# Patient Record
Sex: FEMALE | Race: BLACK OR AFRICAN AMERICAN | Employment: FULL TIME
[De-identification: names, ages, dates, MRNs, and addresses within clinical notes are randomized per-mention and may not be internally consistent; named-entity substitution may affect disease eponyms.]

---

## 2017-01-13 ENCOUNTER — TELEPHONE (OUTPATIENT)
Dept: NEUROLOGY | Facility: CLINIC | Age: 47
End: 2017-01-13

## 2017-01-31 ENCOUNTER — OFFICE VISIT (OUTPATIENT)
Dept: NEUROLOGY | Facility: CLINIC | Age: 47
End: 2017-01-31

## 2017-01-31 DIAGNOSIS — M79.602 PAIN OF LEFT UPPER EXTREMITY: Primary | ICD-10-CM

## 2017-01-31 PROCEDURE — 95886 MUSC TEST DONE W/N TEST COMP: CPT | Performed by: PSYCHIATRY & NEUROLOGY

## 2017-01-31 PROCEDURE — 95909 NRV CNDJ TST 5-6 STUDIES: CPT | Performed by: PSYCHIATRY & NEUROLOGY

## 2017-02-20 ENCOUNTER — OFFICE VISIT (OUTPATIENT)
Dept: ORTHOPEDIC SURGERY | Facility: CLINIC | Age: 47
End: 2017-02-20

## 2017-02-20 VITALS — BODY MASS INDEX: 38.76 KG/M2 | HEIGHT: 68 IN | WEIGHT: 255.73 LBS

## 2017-02-20 DIAGNOSIS — M75.102 ROTATOR CUFF SYNDROME OF LEFT SHOULDER: Primary | ICD-10-CM

## 2017-02-20 DIAGNOSIS — G56.02 CARPAL TUNNEL SYNDROME OF LEFT WRIST: ICD-10-CM

## 2017-02-20 PROCEDURE — 99214 OFFICE O/P EST MOD 30 MIN: CPT | Performed by: ORTHOPAEDIC SURGERY

## 2017-02-20 ASSESSMENT — ENCOUNTER SYMPTOMS
DIARRHEA: 0
VOMITING: 0
CHEST TIGHTNESS: 0
BACK PAIN: 0
WHEEZING: 0
NAUSEA: 0
EYE DISCHARGE: 0
CHOKING: 0
ABDOMINAL PAIN: 0

## 2017-02-24 ENCOUNTER — HOSPITAL ENCOUNTER (OUTPATIENT)
Dept: PHYSICAL THERAPY | Age: 47
Setting detail: THERAPIES SERIES
Discharge: HOME | End: 2017-02-24
Payer: MEDICARE

## 2017-03-21 ENCOUNTER — HOSPITAL ENCOUNTER (OUTPATIENT)
Dept: PHYSICAL THERAPY | Age: 47
Setting detail: THERAPIES SERIES
Discharge: HOME | End: 2017-03-21
Payer: MEDICARE

## 2017-03-21 PROCEDURE — 97110 THERAPEUTIC EXERCISES: CPT

## 2017-03-21 PROCEDURE — 97162 PT EVAL MOD COMPLEX 30 MIN: CPT

## 2017-03-27 ENCOUNTER — HOSPITAL ENCOUNTER (OUTPATIENT)
Dept: PHYSICAL THERAPY | Age: 47
Setting detail: THERAPIES SERIES
Discharge: HOME | End: 2017-03-27
Payer: MEDICARE

## 2017-03-30 ENCOUNTER — HOSPITAL ENCOUNTER (OUTPATIENT)
Dept: PHYSICAL THERAPY | Age: 47
Setting detail: THERAPIES SERIES
Discharge: HOME | End: 2017-03-30
Payer: MEDICARE

## 2017-03-30 PROCEDURE — 97110 THERAPEUTIC EXERCISES: CPT

## 2017-04-10 ENCOUNTER — HOSPITAL ENCOUNTER (OUTPATIENT)
Dept: PHYSICAL THERAPY | Age: 47
Setting detail: THERAPIES SERIES
Discharge: HOME | End: 2017-04-10
Payer: MEDICARE

## 2017-04-13 ENCOUNTER — HOSPITAL ENCOUNTER (OUTPATIENT)
Dept: PHYSICAL THERAPY | Age: 47
Setting detail: THERAPIES SERIES
Discharge: HOME | End: 2017-04-13
Payer: MEDICARE

## 2017-04-13 PROCEDURE — 97140 MANUAL THERAPY 1/> REGIONS: CPT

## 2017-04-13 PROCEDURE — 97110 THERAPEUTIC EXERCISES: CPT

## 2017-04-17 ENCOUNTER — OFFICE VISIT (OUTPATIENT)
Dept: ORTHOPEDIC SURGERY | Age: 47
End: 2017-04-17
Payer: MEDICARE

## 2017-04-17 VITALS — WEIGHT: 255.73 LBS | BODY MASS INDEX: 38.76 KG/M2 | HEIGHT: 68 IN

## 2017-04-17 DIAGNOSIS — M75.102 ROTATOR CUFF SYNDROME OF LEFT SHOULDER: Primary | ICD-10-CM

## 2017-04-17 DIAGNOSIS — G56.02 CARPAL TUNNEL SYNDROME OF LEFT WRIST: ICD-10-CM

## 2017-04-17 PROCEDURE — 99213 OFFICE O/P EST LOW 20 MIN: CPT | Performed by: ORTHOPAEDIC SURGERY

## 2017-04-17 ASSESSMENT — ENCOUNTER SYMPTOMS
VOMITING: 0
SORE THROAT: 0
SHORTNESS OF BREATH: 0
COUGH: 0
DIARRHEA: 0
BACK PAIN: 1
CHOKING: 0
SINUS PRESSURE: 0
NAUSEA: 0

## 2017-04-19 ENCOUNTER — HOSPITAL ENCOUNTER (OUTPATIENT)
Dept: PHYSICAL THERAPY | Age: 47
Setting detail: THERAPIES SERIES
Discharge: HOME | End: 2017-04-19
Payer: MEDICARE

## 2017-04-19 PROCEDURE — 97140 MANUAL THERAPY 1/> REGIONS: CPT

## 2017-04-19 PROCEDURE — 97110 THERAPEUTIC EXERCISES: CPT

## 2017-04-25 ENCOUNTER — HOSPITAL ENCOUNTER (OUTPATIENT)
Dept: PHYSICAL THERAPY | Age: 47
Setting detail: THERAPIES SERIES
Discharge: HOME | End: 2017-04-25
Payer: MEDICARE

## 2017-04-27 ENCOUNTER — HOSPITAL ENCOUNTER (OUTPATIENT)
Dept: PHYSICAL THERAPY | Age: 47
Setting detail: THERAPIES SERIES
Discharge: HOME | End: 2017-04-27
Payer: MEDICARE

## 2017-04-27 PROCEDURE — 97110 THERAPEUTIC EXERCISES: CPT

## 2017-05-02 ENCOUNTER — HOSPITAL ENCOUNTER (OUTPATIENT)
Dept: PHYSICAL THERAPY | Age: 47
Setting detail: THERAPIES SERIES
Discharge: HOME | End: 2017-05-02
Payer: MEDICARE

## 2017-05-02 PROCEDURE — 97110 THERAPEUTIC EXERCISES: CPT

## 2017-05-04 ENCOUNTER — HOSPITAL ENCOUNTER (OUTPATIENT)
Dept: PHYSICAL THERAPY | Age: 47
Setting detail: THERAPIES SERIES
Discharge: HOME | End: 2017-05-04
Payer: MEDICARE

## 2017-05-04 PROCEDURE — 97110 THERAPEUTIC EXERCISES: CPT

## 2017-05-09 ENCOUNTER — HOSPITAL ENCOUNTER (OUTPATIENT)
Dept: PHYSICAL THERAPY | Age: 47
Setting detail: THERAPIES SERIES
Discharge: HOME | End: 2017-05-09
Payer: MEDICARE

## 2017-05-09 PROCEDURE — 97110 THERAPEUTIC EXERCISES: CPT

## 2017-05-11 ENCOUNTER — HOSPITAL ENCOUNTER (OUTPATIENT)
Dept: PHYSICAL THERAPY | Age: 47
Setting detail: THERAPIES SERIES
Discharge: HOME | End: 2017-05-11
Payer: MEDICARE

## 2017-05-11 PROCEDURE — 97110 THERAPEUTIC EXERCISES: CPT

## 2017-05-16 ENCOUNTER — HOSPITAL ENCOUNTER (OUTPATIENT)
Dept: PHYSICAL THERAPY | Age: 47
Setting detail: THERAPIES SERIES
Discharge: HOME | End: 2017-05-16
Payer: MEDICARE

## 2017-05-16 PROCEDURE — 97110 THERAPEUTIC EXERCISES: CPT

## 2017-05-23 ENCOUNTER — HOSPITAL ENCOUNTER (OUTPATIENT)
Dept: PHYSICAL THERAPY | Age: 47
Setting detail: THERAPIES SERIES
Discharge: HOME | End: 2017-05-23
Payer: MEDICARE

## 2017-05-23 PROCEDURE — 97110 THERAPEUTIC EXERCISES: CPT

## 2017-05-25 ENCOUNTER — HOSPITAL ENCOUNTER (OUTPATIENT)
Dept: PHYSICAL THERAPY | Age: 47
Setting detail: THERAPIES SERIES
Discharge: HOME | End: 2017-05-25
Payer: MEDICARE

## 2017-05-25 PROCEDURE — 97110 THERAPEUTIC EXERCISES: CPT

## 2017-07-17 ENCOUNTER — HOSPITAL ENCOUNTER (EMERGENCY)
Age: 47
Discharge: HOME | End: 2017-07-17
Attending: EMERGENCY MEDICINE
Payer: MEDICARE

## 2017-07-17 ENCOUNTER — APPOINTMENT (OUTPATIENT)
Dept: GENERAL RADIOLOGY | Age: 47
End: 2017-07-17
Payer: MEDICARE

## 2017-07-17 VITALS
SYSTOLIC BLOOD PRESSURE: 159 MMHG | HEART RATE: 72 BPM | TEMPERATURE: 97.5 F | DIASTOLIC BLOOD PRESSURE: 104 MMHG | RESPIRATION RATE: 18 BRPM | OXYGEN SATURATION: 100 %

## 2017-07-17 DIAGNOSIS — S86.912A KNEE STRAIN, LEFT, INITIAL ENCOUNTER: Primary | ICD-10-CM

## 2017-07-17 PROCEDURE — 73562 X-RAY EXAM OF KNEE 3: CPT

## 2017-07-17 PROCEDURE — 99283 EMERGENCY DEPT VISIT LOW MDM: CPT

## 2017-07-17 RX ORDER — ACETAMINOPHEN 325 MG/1
325 TABLET ORAL EVERY 6 HOURS PRN
Qty: 30 TABLET | Refills: 0 | Status: SHIPPED | OUTPATIENT
Start: 2017-07-17 | End: 2017-07-17

## 2017-07-17 ASSESSMENT — ENCOUNTER SYMPTOMS
SHORTNESS OF BREATH: 0
COUGH: 0
VOMITING: 0
ABDOMINAL PAIN: 0
COLOR CHANGE: 0
BACK PAIN: 0
NAUSEA: 0
DIARRHEA: 0

## 2017-07-17 ASSESSMENT — PAIN DESCRIPTION - ORIENTATION: ORIENTATION: LEFT

## 2017-07-17 ASSESSMENT — PAIN DESCRIPTION - DESCRIPTORS: DESCRIPTORS: ACHING

## 2017-07-17 ASSESSMENT — PAIN DESCRIPTION - PAIN TYPE: TYPE: ACUTE PAIN

## 2017-07-17 ASSESSMENT — PAIN SCALES - GENERAL: PAINLEVEL_OUTOF10: 10

## 2017-07-17 ASSESSMENT — PAIN DESCRIPTION - LOCATION: LOCATION: KNEE

## 2017-07-17 ASSESSMENT — PAIN DESCRIPTION - PROGRESSION: CLINICAL_PROGRESSION: GRADUALLY WORSENING

## 2017-07-17 ASSESSMENT — PAIN DESCRIPTION - ONSET: ONSET: SUDDEN

## 2017-07-21 ENCOUNTER — HOSPITAL ENCOUNTER (EMERGENCY)
Age: 47
Discharge: HOME | End: 2017-07-21
Attending: EMERGENCY MEDICINE
Payer: MEDICARE

## 2017-07-21 VITALS
BODY MASS INDEX: 39.25 KG/M2 | WEIGHT: 265 LBS | SYSTOLIC BLOOD PRESSURE: 103 MMHG | RESPIRATION RATE: 18 BRPM | DIASTOLIC BLOOD PRESSURE: 69 MMHG | HEART RATE: 87 BPM | TEMPERATURE: 98.6 F | OXYGEN SATURATION: 96 % | HEIGHT: 69 IN

## 2017-07-21 DIAGNOSIS — M25.562 ACUTE PAIN OF LEFT KNEE: Primary | ICD-10-CM

## 2017-07-21 PROCEDURE — 6370000000 HC RX 637 (ALT 250 FOR IP): Performed by: FAMILY MEDICINE

## 2017-07-21 PROCEDURE — 99283 EMERGENCY DEPT VISIT LOW MDM: CPT

## 2017-07-21 RX ORDER — OXYCODONE HYDROCHLORIDE AND ACETAMINOPHEN 5; 325 MG/1; MG/1
1 TABLET ORAL ONCE
Status: COMPLETED | OUTPATIENT
Start: 2017-07-21 | End: 2017-07-21

## 2017-07-21 RX ORDER — HYDROCODONE BITARTRATE AND ACETAMINOPHEN 5; 325 MG/1; MG/1
1 TABLET ORAL EVERY 8 HOURS PRN
Qty: 10 TABLET | Refills: 0 | Status: ON HOLD | OUTPATIENT
Start: 2017-07-21 | End: 2019-04-20 | Stop reason: SDUPTHER

## 2017-07-21 RX ADMIN — OXYCODONE HYDROCHLORIDE AND ACETAMINOPHEN 1 TABLET: 5; 325 TABLET ORAL at 18:40

## 2017-07-21 ASSESSMENT — PAIN DESCRIPTION - PAIN TYPE: TYPE: ACUTE PAIN

## 2017-07-21 ASSESSMENT — ENCOUNTER SYMPTOMS
COUGH: 0
NAUSEA: 0
DIARRHEA: 0
ABDOMINAL PAIN: 0
SHORTNESS OF BREATH: 0
CONSTIPATION: 0
BACK PAIN: 0
SORE THROAT: 0
VOMITING: 0

## 2017-07-21 ASSESSMENT — PAIN DESCRIPTION - LOCATION: LOCATION: KNEE

## 2017-07-21 ASSESSMENT — PAIN SCALES - GENERAL
PAINLEVEL_OUTOF10: 10
PAINLEVEL_OUTOF10: 4
PAINLEVEL_OUTOF10: 10

## 2017-07-21 ASSESSMENT — PAIN DESCRIPTION - ORIENTATION: ORIENTATION: LEFT

## 2017-07-21 ASSESSMENT — PAIN DESCRIPTION - DESCRIPTORS: DESCRIPTORS: ACHING;THROBBING;STABBING;SHARP

## 2018-05-26 ENCOUNTER — APPOINTMENT (OUTPATIENT)
Dept: GENERAL RADIOLOGY | Age: 48
End: 2018-05-26
Payer: MEDICARE

## 2018-05-26 ENCOUNTER — HOSPITAL ENCOUNTER (EMERGENCY)
Age: 48
Discharge: HOME | End: 2018-05-26
Attending: EMERGENCY MEDICINE
Payer: MEDICARE

## 2018-05-26 ENCOUNTER — APPOINTMENT (OUTPATIENT)
Dept: CT IMAGING | Age: 48
End: 2018-05-26
Payer: MEDICARE

## 2018-05-26 VITALS
RESPIRATION RATE: 18 BRPM | TEMPERATURE: 97.9 F | WEIGHT: 273 LBS | OXYGEN SATURATION: 97 % | HEART RATE: 90 BPM | SYSTOLIC BLOOD PRESSURE: 138 MMHG | DIASTOLIC BLOOD PRESSURE: 85 MMHG | BODY MASS INDEX: 40.32 KG/M2

## 2018-05-26 DIAGNOSIS — R10.9 ABDOMINAL PAIN, UNSPECIFIED ABDOMINAL LOCATION: Primary | ICD-10-CM

## 2018-05-26 LAB
-: ABNORMAL
ABSOLUTE EOS #: 0.13 K/UL (ref 0–0.44)
ABSOLUTE IMMATURE GRANULOCYTE: <0.03 K/UL (ref 0–0.3)
ABSOLUTE LYMPH #: 2.84 K/UL (ref 1.1–3.7)
ABSOLUTE MONO #: 0.84 K/UL (ref 0.1–1.2)
ALBUMIN SERPL-MCNC: 3.9 G/DL (ref 3.5–5.2)
ALBUMIN/GLOBULIN RATIO: 1 (ref 1–2.5)
ALP BLD-CCNC: 69 U/L (ref 35–104)
ALT SERPL-CCNC: 9 U/L (ref 5–33)
AMORPHOUS: ABNORMAL
ANION GAP SERPL CALCULATED.3IONS-SCNC: 13 MMOL/L (ref 9–17)
AST SERPL-CCNC: 21 U/L
BACTERIA: ABNORMAL
BASOPHILS # BLD: 0 % (ref 0–2)
BASOPHILS ABSOLUTE: <0.03 K/UL (ref 0–0.2)
BILIRUB SERPL-MCNC: 0.34 MG/DL (ref 0.3–1.2)
BILIRUBIN DIRECT: <0.08 MG/DL
BILIRUBIN URINE: NEGATIVE
BILIRUBIN, INDIRECT: NORMAL MG/DL (ref 0–1)
BUN BLDV-MCNC: 10 MG/DL (ref 6–20)
BUN/CREAT BLD: ABNORMAL (ref 9–20)
CALCIUM SERPL-MCNC: 8.6 MG/DL (ref 8.6–10.4)
CASTS UA: ABNORMAL /LPF (ref 0–8)
CHLORIDE BLD-SCNC: 97 MMOL/L (ref 98–107)
CO2: 23 MMOL/L (ref 20–31)
COLOR: YELLOW
CREAT SERPL-MCNC: 0.55 MG/DL (ref 0.5–0.9)
CRYSTALS, UA: ABNORMAL /HPF
DIFFERENTIAL TYPE: ABNORMAL
EOSINOPHILS RELATIVE PERCENT: 2 % (ref 1–4)
EPITHELIAL CELLS UA: ABNORMAL /HPF (ref 0–5)
GFR AFRICAN AMERICAN: >60 ML/MIN
GFR NON-AFRICAN AMERICAN: >60 ML/MIN
GFR SERPL CREATININE-BSD FRML MDRD: ABNORMAL ML/MIN/{1.73_M2}
GFR SERPL CREATININE-BSD FRML MDRD: ABNORMAL ML/MIN/{1.73_M2}
GLOBULIN: NORMAL G/DL (ref 1.5–3.8)
GLUCOSE BLD-MCNC: 98 MG/DL (ref 70–99)
GLUCOSE URINE: NEGATIVE
HCT VFR BLD CALC: 36.2 % (ref 36.3–47.1)
HEMOGLOBIN: 11 G/DL (ref 11.9–15.1)
IMMATURE GRANULOCYTES: 0 %
KETONES, URINE: NEGATIVE
LEUKOCYTE ESTERASE, URINE: NEGATIVE
LIPASE: 20 U/L (ref 13–60)
LYMPHOCYTES # BLD: 35 % (ref 24–43)
MCH RBC QN AUTO: 22.4 PG (ref 25.2–33.5)
MCHC RBC AUTO-ENTMCNC: 30.4 G/DL (ref 28.4–34.8)
MCV RBC AUTO: 73.7 FL (ref 82.6–102.9)
MONOCYTES # BLD: 10 % (ref 3–12)
MUCUS: ABNORMAL
NITRITE, URINE: NEGATIVE
NRBC AUTOMATED: 0 PER 100 WBC
OTHER OBSERVATIONS UA: ABNORMAL
PDW BLD-RTO: 14.8 % (ref 11.8–14.4)
PH UA: 5.5 (ref 5–8)
PLATELET # BLD: 252 K/UL (ref 138–453)
PLATELET ESTIMATE: ABNORMAL
PMV BLD AUTO: 10.7 FL (ref 8.1–13.5)
POTASSIUM SERPL-SCNC: 3.5 MMOL/L (ref 3.7–5.3)
PROTEIN UA: NEGATIVE
RBC # BLD: 4.91 M/UL (ref 3.95–5.11)
RBC # BLD: ABNORMAL 10*6/UL
RBC UA: ABNORMAL /HPF (ref 0–4)
RENAL EPITHELIAL, UA: ABNORMAL /HPF
SEG NEUTROPHILS: 53 % (ref 36–65)
SEGMENTED NEUTROPHILS ABSOLUTE COUNT: 4.31 K/UL (ref 1.5–8.1)
SODIUM BLD-SCNC: 133 MMOL/L (ref 135–144)
SPECIFIC GRAVITY UA: 1.02 (ref 1–1.03)
TOTAL PROTEIN: 7.7 G/DL (ref 6.4–8.3)
TRICHOMONAS: ABNORMAL
TURBIDITY: CLEAR
URINE HGB: ABNORMAL
UROBILINOGEN, URINE: NORMAL
WBC # BLD: 8.2 K/UL (ref 3.5–11.3)
WBC # BLD: ABNORMAL 10*3/UL
WBC UA: ABNORMAL /HPF (ref 0–5)
YEAST: ABNORMAL

## 2018-05-26 PROCEDURE — 80076 HEPATIC FUNCTION PANEL: CPT

## 2018-05-26 PROCEDURE — 2580000003 HC RX 258: Performed by: EMERGENCY MEDICINE

## 2018-05-26 PROCEDURE — 6360000002 HC RX W HCPCS: Performed by: EMERGENCY MEDICINE

## 2018-05-26 PROCEDURE — 87086 URINE CULTURE/COLONY COUNT: CPT

## 2018-05-26 PROCEDURE — 99285 EMERGENCY DEPT VISIT HI MDM: CPT

## 2018-05-26 PROCEDURE — 96374 THER/PROPH/DIAG INJ IV PUSH: CPT

## 2018-05-26 PROCEDURE — 74176 CT ABD & PELVIS W/O CONTRAST: CPT

## 2018-05-26 PROCEDURE — 85025 COMPLETE CBC W/AUTO DIFF WBC: CPT

## 2018-05-26 PROCEDURE — 83690 ASSAY OF LIPASE: CPT

## 2018-05-26 PROCEDURE — 6370000000 HC RX 637 (ALT 250 FOR IP): Performed by: EMERGENCY MEDICINE

## 2018-05-26 PROCEDURE — 80048 BASIC METABOLIC PNL TOTAL CA: CPT

## 2018-05-26 PROCEDURE — 81001 URINALYSIS AUTO W/SCOPE: CPT

## 2018-05-26 PROCEDURE — 96375 TX/PRO/DX INJ NEW DRUG ADDON: CPT

## 2018-05-26 PROCEDURE — 6370000000 HC RX 637 (ALT 250 FOR IP)

## 2018-05-26 PROCEDURE — 74022 RADEX COMPL AQT ABD SERIES: CPT

## 2018-05-26 RX ORDER — DIPHENHYDRAMINE HCL 25 MG
25 CAPSULE ORAL EVERY 6 HOURS PRN
Qty: 30 CAPSULE | Refills: 0 | Status: SHIPPED | OUTPATIENT
Start: 2018-05-26 | End: 2018-06-05

## 2018-05-26 RX ORDER — 0.9 % SODIUM CHLORIDE 0.9 %
1000 INTRAVENOUS SOLUTION INTRAVENOUS ONCE
Status: COMPLETED | OUTPATIENT
Start: 2018-05-26 | End: 2018-05-26

## 2018-05-26 RX ORDER — DICYCLOMINE HYDROCHLORIDE 10 MG/ML
20 INJECTION INTRAMUSCULAR ONCE
Status: DISCONTINUED | OUTPATIENT
Start: 2018-05-26 | End: 2018-05-26 | Stop reason: HOSPADM

## 2018-05-26 RX ORDER — MAGNESIUM HYDROXIDE/ALUMINUM HYDROXICE/SIMETHICONE 120; 1200; 1200 MG/30ML; MG/30ML; MG/30ML
30 SUSPENSION ORAL ONCE
Status: COMPLETED | OUTPATIENT
Start: 2018-05-26 | End: 2018-05-26

## 2018-05-26 RX ORDER — DICYCLOMINE HYDROCHLORIDE 10 MG/1
10 CAPSULE ORAL EVERY 6 HOURS PRN
Qty: 20 CAPSULE | Refills: 0 | Status: SHIPPED | OUTPATIENT
Start: 2018-05-26

## 2018-05-26 RX ORDER — METOCLOPRAMIDE HYDROCHLORIDE 5 MG/ML
10 INJECTION INTRAMUSCULAR; INTRAVENOUS ONCE
Status: COMPLETED | OUTPATIENT
Start: 2018-05-26 | End: 2018-05-26

## 2018-05-26 RX ORDER — DICYCLOMINE HYDROCHLORIDE 10 MG/1
CAPSULE ORAL
Status: COMPLETED
Start: 2018-05-26 | End: 2018-05-26

## 2018-05-26 RX ORDER — DIPHENHYDRAMINE HYDROCHLORIDE 50 MG/ML
25 INJECTION INTRAMUSCULAR; INTRAVENOUS ONCE
Status: COMPLETED | OUTPATIENT
Start: 2018-05-26 | End: 2018-05-26

## 2018-05-26 RX ORDER — METOCLOPRAMIDE 10 MG/1
10 TABLET ORAL 3 TIMES DAILY PRN
Qty: 30 TABLET | Refills: 0 | Status: ON HOLD | OUTPATIENT
Start: 2018-05-26 | End: 2019-04-19 | Stop reason: ALTCHOICE

## 2018-05-26 RX ADMIN — DIPHENHYDRAMINE HYDROCHLORIDE 25 MG: 50 INJECTION, SOLUTION INTRAMUSCULAR; INTRAVENOUS at 03:25

## 2018-05-26 RX ADMIN — LIDOCAINE HYDROCHLORIDE 15 ML: 20 SOLUTION ORAL; TOPICAL at 02:46

## 2018-05-26 RX ADMIN — METOCLOPRAMIDE 10 MG: 5 INJECTION, SOLUTION INTRAMUSCULAR; INTRAVENOUS at 03:25

## 2018-05-26 RX ADMIN — DICYCLOMINE HYDROCHLORIDE 10 MG: 10 CAPSULE ORAL at 02:46

## 2018-05-26 RX ADMIN — SODIUM CHLORIDE 1000 ML: 9 INJECTION, SOLUTION INTRAVENOUS at 02:46

## 2018-05-26 RX ADMIN — ALUMINUM HYDROXIDE, MAGNESIUM HYDROXIDE, AND SIMETHICONE 30 ML: 200; 200; 20 SUSPENSION ORAL at 02:46

## 2018-05-26 ASSESSMENT — PAIN DESCRIPTION - ORIENTATION: ORIENTATION: MID;LOWER

## 2018-05-26 ASSESSMENT — PAIN DESCRIPTION - PAIN TYPE: TYPE: CHRONIC PAIN

## 2018-05-26 ASSESSMENT — ENCOUNTER SYMPTOMS
DIARRHEA: 1
ABDOMINAL PAIN: 1
COUGH: 0
CONSTIPATION: 1
EYE REDNESS: 0
CHEST TIGHTNESS: 0
NAUSEA: 0
RHINORRHEA: 0
BLOOD IN STOOL: 0
VOMITING: 0
SORE THROAT: 0
SHORTNESS OF BREATH: 0
EYE DISCHARGE: 0

## 2018-05-26 ASSESSMENT — PAIN DESCRIPTION - DESCRIPTORS: DESCRIPTORS: BURNING;TIGHTNESS

## 2018-05-26 ASSESSMENT — PAIN DESCRIPTION - LOCATION: LOCATION: ABDOMEN

## 2018-05-26 ASSESSMENT — PAIN SCALES - GENERAL: PAINLEVEL_OUTOF10: 9

## 2018-05-26 ASSESSMENT — PAIN DESCRIPTION - FREQUENCY: FREQUENCY: CONTINUOUS

## 2018-05-27 LAB
CULTURE: NORMAL
CULTURE: NORMAL
Lab: NORMAL
SPECIMEN DESCRIPTION: NORMAL
STATUS: NORMAL

## 2018-06-29 ENCOUNTER — HOSPITAL ENCOUNTER (OUTPATIENT)
Age: 48
Discharge: HOME | End: 2018-06-29
Payer: MEDICARE

## 2018-06-29 LAB
ALBUMIN SERPL-MCNC: 4.4 G/DL (ref 3.5–5.2)
ALBUMIN/GLOBULIN RATIO: 1.2 (ref 1–2.5)
ALP BLD-CCNC: 59 U/L (ref 35–104)
ALT SERPL-CCNC: 16 U/L (ref 5–33)
ANION GAP SERPL CALCULATED.3IONS-SCNC: 15 MMOL/L (ref 9–17)
AST SERPL-CCNC: 23 U/L
BILIRUB SERPL-MCNC: 0.38 MG/DL (ref 0.3–1.2)
BUN BLDV-MCNC: 8 MG/DL (ref 6–20)
BUN/CREAT BLD: ABNORMAL (ref 9–20)
CALCIUM SERPL-MCNC: 9.3 MG/DL (ref 8.6–10.4)
CHLORIDE BLD-SCNC: 103 MMOL/L (ref 98–107)
CHOLESTEROL/HDL RATIO: 3.6
CHOLESTEROL: 186 MG/DL
CO2: 23 MMOL/L (ref 20–31)
CREAT SERPL-MCNC: 0.57 MG/DL (ref 0.5–0.9)
GFR AFRICAN AMERICAN: >60 ML/MIN
GFR NON-AFRICAN AMERICAN: >60 ML/MIN
GFR SERPL CREATININE-BSD FRML MDRD: ABNORMAL ML/MIN/{1.73_M2}
GFR SERPL CREATININE-BSD FRML MDRD: ABNORMAL ML/MIN/{1.73_M2}
GLUCOSE BLD-MCNC: 80 MG/DL (ref 70–99)
HCT VFR BLD CALC: 40 % (ref 36.3–47.1)
HDLC SERPL-MCNC: 51 MG/DL
HEMOGLOBIN: 11.9 G/DL (ref 11.9–15.1)
LDL CHOLESTEROL: 119 MG/DL (ref 0–130)
MCH RBC QN AUTO: 22.2 PG (ref 25.2–33.5)
MCHC RBC AUTO-ENTMCNC: 29.8 G/DL (ref 28.4–34.8)
MCV RBC AUTO: 74.8 FL (ref 82.6–102.9)
NRBC AUTOMATED: 0 PER 100 WBC
PDW BLD-RTO: 15.2 % (ref 11.8–14.4)
PLATELET # BLD: 247 K/UL (ref 138–453)
PMV BLD AUTO: 11.9 FL (ref 8.1–13.5)
POTASSIUM SERPL-SCNC: 3.6 MMOL/L (ref 3.7–5.3)
RBC # BLD: 5.35 M/UL (ref 3.95–5.11)
SODIUM BLD-SCNC: 141 MMOL/L (ref 135–144)
T3 TOTAL: 138 NG/DL (ref 80–200)
T4 TOTAL: 6.8 UG/DL (ref 4.5–12)
TOTAL PROTEIN: 8.2 G/DL (ref 6.4–8.3)
TRIGL SERPL-MCNC: 80 MG/DL
TSH SERPL DL<=0.05 MIU/L-ACNC: 3.23 MIU/L (ref 0.3–5)
VITAMIN D 25-HYDROXY: 12.4 NG/ML (ref 30–100)
VLDLC SERPL CALC-MCNC: NORMAL MG/DL (ref 1–30)
WBC # BLD: 5.7 K/UL (ref 3.5–11.3)

## 2018-06-29 PROCEDURE — 84436 ASSAY OF TOTAL THYROXINE: CPT

## 2018-06-29 PROCEDURE — 80053 COMPREHEN METABOLIC PANEL: CPT

## 2018-06-29 PROCEDURE — 80061 LIPID PANEL: CPT

## 2018-06-29 PROCEDURE — 82306 VITAMIN D 25 HYDROXY: CPT

## 2018-06-29 PROCEDURE — 84480 ASSAY TRIIODOTHYRONINE (T3): CPT

## 2018-06-29 PROCEDURE — 36415 COLL VENOUS BLD VENIPUNCTURE: CPT

## 2018-06-29 PROCEDURE — 84443 ASSAY THYROID STIM HORMONE: CPT

## 2018-06-29 PROCEDURE — 85027 COMPLETE CBC AUTOMATED: CPT

## 2018-12-04 ENCOUNTER — HOSPITAL ENCOUNTER (EMERGENCY)
Age: 48
Discharge: HOME | End: 2018-12-04
Attending: EMERGENCY MEDICINE
Payer: MEDICARE

## 2018-12-04 VITALS
HEART RATE: 90 BPM | OXYGEN SATURATION: 100 % | RESPIRATION RATE: 18 BRPM | TEMPERATURE: 97.4 F | WEIGHT: 239 LBS | HEIGHT: 69 IN | DIASTOLIC BLOOD PRESSURE: 105 MMHG | SYSTOLIC BLOOD PRESSURE: 162 MMHG | BODY MASS INDEX: 35.4 KG/M2

## 2018-12-04 DIAGNOSIS — M79.7 FIBROMYALGIA: ICD-10-CM

## 2018-12-04 DIAGNOSIS — K08.89 PAIN, DENTAL: ICD-10-CM

## 2018-12-04 DIAGNOSIS — H66.90 ACUTE OTITIS MEDIA, UNSPECIFIED OTITIS MEDIA TYPE: Primary | ICD-10-CM

## 2018-12-04 PROCEDURE — 2580000003 HC RX 258: Performed by: EMERGENCY MEDICINE

## 2018-12-04 PROCEDURE — 6360000002 HC RX W HCPCS: Performed by: EMERGENCY MEDICINE

## 2018-12-04 PROCEDURE — 96365 THER/PROPH/DIAG IV INF INIT: CPT

## 2018-12-04 PROCEDURE — 96372 THER/PROPH/DIAG INJ SC/IM: CPT

## 2018-12-04 PROCEDURE — 99283 EMERGENCY DEPT VISIT LOW MDM: CPT

## 2018-12-04 PROCEDURE — 6370000000 HC RX 637 (ALT 250 FOR IP): Performed by: EMERGENCY MEDICINE

## 2018-12-04 PROCEDURE — 96375 TX/PRO/DX INJ NEW DRUG ADDON: CPT

## 2018-12-04 RX ORDER — ORPHENADRINE CITRATE 100 MG/1
100 TABLET, EXTENDED RELEASE ORAL 2 TIMES DAILY
Qty: 20 TABLET | Refills: 0 | Status: SHIPPED | OUTPATIENT
Start: 2018-12-04 | End: 2018-12-14

## 2018-12-04 RX ORDER — KETOROLAC TROMETHAMINE 30 MG/ML
30 INJECTION, SOLUTION INTRAMUSCULAR; INTRAVENOUS ONCE
Status: DISCONTINUED | OUTPATIENT
Start: 2018-12-04 | End: 2018-12-04

## 2018-12-04 RX ORDER — HYDROXYZINE 50 MG/1
50 TABLET, FILM COATED ORAL 3 TIMES DAILY PRN
Qty: 25 TABLET | Refills: 0 | Status: SHIPPED | OUTPATIENT
Start: 2018-12-04 | End: 2018-12-14

## 2018-12-04 RX ORDER — ACETAMINOPHEN 325 MG/1
325 TABLET ORAL EVERY 6 HOURS PRN
Qty: 45 TABLET | Refills: 0 | Status: SHIPPED | OUTPATIENT
Start: 2018-12-04 | End: 2018-12-19

## 2018-12-04 RX ORDER — ACETAMINOPHEN 500 MG
1000 TABLET ORAL ONCE
Status: COMPLETED | OUTPATIENT
Start: 2018-12-04 | End: 2018-12-04

## 2018-12-04 RX ORDER — ORPHENADRINE CITRATE 100 MG/1
100 TABLET, EXTENDED RELEASE ORAL ONCE
Status: COMPLETED | OUTPATIENT
Start: 2018-12-04 | End: 2018-12-04

## 2018-12-04 RX ORDER — KETOROLAC TROMETHAMINE 30 MG/ML
30 INJECTION, SOLUTION INTRAMUSCULAR; INTRAVENOUS ONCE
Status: COMPLETED | OUTPATIENT
Start: 2018-12-04 | End: 2018-12-04

## 2018-12-04 RX ORDER — HYDROXYZINE HYDROCHLORIDE 25 MG/1
25 TABLET, FILM COATED ORAL ONCE
Status: COMPLETED | OUTPATIENT
Start: 2018-12-04 | End: 2018-12-04

## 2018-12-04 RX ORDER — CEFTRIAXONE 2 G/1
2 INJECTION, POWDER, FOR SOLUTION INTRAMUSCULAR; INTRAVENOUS ONCE
Status: DISCONTINUED | OUTPATIENT
Start: 2018-12-04 | End: 2018-12-04

## 2018-12-04 RX ADMIN — CEFTRIAXONE SODIUM 2 G: 2 INJECTION, POWDER, FOR SOLUTION INTRAMUSCULAR; INTRAVENOUS at 04:04

## 2018-12-04 RX ADMIN — KETOROLAC TROMETHAMINE 30 MG: 30 INJECTION, SOLUTION INTRAMUSCULAR at 04:04

## 2018-12-04 RX ADMIN — ACETAMINOPHEN 1000 MG: 500 TABLET ORAL at 04:45

## 2018-12-04 RX ADMIN — ORPHENADRINE CITRATE 100 MG: 100 TABLET, EXTENDED RELEASE ORAL at 04:04

## 2018-12-04 RX ADMIN — HYDROXYZINE HYDROCHLORIDE 25 MG: 25 TABLET ORAL at 04:04

## 2018-12-04 ASSESSMENT — PAIN SCALES - GENERAL
PAINLEVEL_OUTOF10: 10
PAINLEVEL_OUTOF10: 10
PAINLEVEL_OUTOF10: 7

## 2018-12-04 ASSESSMENT — ENCOUNTER SYMPTOMS
ABDOMINAL PAIN: 0
EYE DISCHARGE: 0
BLOOD IN STOOL: 0
SHORTNESS OF BREATH: 0
CHEST TIGHTNESS: 0
DIARRHEA: 0
RHINORRHEA: 0
VOMITING: 0
NAUSEA: 0
SINUS PAIN: 1
EYE REDNESS: 0
SORE THROAT: 0
COUGH: 1

## 2018-12-04 ASSESSMENT — PAIN DESCRIPTION - FREQUENCY: FREQUENCY: INTERMITTENT

## 2018-12-04 ASSESSMENT — PAIN DESCRIPTION - LOCATION: LOCATION: JAW;HEAD

## 2018-12-04 ASSESSMENT — PAIN DESCRIPTION - PAIN TYPE: TYPE: ACUTE PAIN;CHRONIC PAIN

## 2018-12-04 NOTE — ED PROVIDER NOTES
for abdominal pain, blood in stool, diarrhea, nausea and vomiting. Endocrine: Negative for polydipsia and polyuria. Genitourinary: Negative for dysuria and hematuria. Musculoskeletal: Positive for myalgias. Negative for neck pain and neck stiffness. Skin: Negative for rash and wound. Allergic/Immunologic: Negative for immunocompromised state. Neurological: Negative for weakness, numbness and headaches. Hematological: Negative for adenopathy. Psychiatric/Behavioral: Negative for agitation and behavioral problems. PHYSICAL EXAM   (up to 7 for level 4, 8 or more for level 5)      INITIAL VITALS:   BP (!) 162/105   Pulse 90   Temp 97.4 °F (36.3 °C) (Oral)   Resp 18   Ht 5' 9\" (1.753 m)   Wt 239 lb (108.4 kg)   SpO2 100%   BMI 35.29 kg/m²     Physical Exam   Constitutional: She is oriented to person, place, and time. She appears well-developed and well-nourished. No distress. Patient is well-appearing, nontoxic, no acute distress, resting comfortably in bed     HENT:   Head: Normocephalic and atraumatic. Posterior oropharynx normal with no fullness, no erythema, no uvular deviation, no tonsillar hypertrophy or exudate seen, no difficulty controlling secretions, no change in phonation, no asymmetry noted, tympanostomy tubes seen bilaterally, right better membrane appears normal, left tympanic membrane slightly erythematous with fluid behind the ear, concerning for otitis media, dental pain, no areas of fluctuance or noted abscesses     Eyes: Pupils are equal, round, and reactive to light. Conjunctivae and EOM are normal.   Neck: Normal range of motion. Neck supple. No JVD present. No tracheal deviation present. Cardiovascular: Normal rate, regular rhythm, normal heart sounds and intact distal pulses. Hypertension   Pulmonary/Chest: Effort normal and breath sounds normal. No stridor. No respiratory distress. She has no wheezes. She has no rales. She exhibits no tenderness.    Abdominal:

## 2018-12-11 ENCOUNTER — HOSPITAL ENCOUNTER (EMERGENCY)
Age: 48
Discharge: HOME | End: 2018-12-11
Attending: EMERGENCY MEDICINE
Payer: MEDICARE

## 2018-12-11 VITALS
TEMPERATURE: 97.5 F | RESPIRATION RATE: 16 BRPM | SYSTOLIC BLOOD PRESSURE: 145 MMHG | DIASTOLIC BLOOD PRESSURE: 89 MMHG | BODY MASS INDEX: 35.4 KG/M2 | WEIGHT: 239 LBS | HEIGHT: 69 IN | HEART RATE: 75 BPM | OXYGEN SATURATION: 100 %

## 2018-12-11 DIAGNOSIS — K08.89 PAIN, DENTAL: Primary | ICD-10-CM

## 2018-12-11 PROCEDURE — 99282 EMERGENCY DEPT VISIT SF MDM: CPT

## 2018-12-11 PROCEDURE — 6370000000 HC RX 637 (ALT 250 FOR IP): Performed by: STUDENT IN AN ORGANIZED HEALTH CARE EDUCATION/TRAINING PROGRAM

## 2018-12-11 RX ORDER — CLINDAMYCIN HYDROCHLORIDE 150 MG/1
450 CAPSULE ORAL 3 TIMES DAILY
Qty: 63 CAPSULE | Refills: 0 | Status: SHIPPED | OUTPATIENT
Start: 2018-12-11 | End: 2018-12-18

## 2018-12-11 RX ORDER — CEPHALEXIN 500 MG/1
500 CAPSULE ORAL 4 TIMES DAILY
COMMUNITY
End: 2019-09-18

## 2018-12-11 RX ORDER — CLINDAMYCIN HYDROCHLORIDE 150 MG/1
450 CAPSULE ORAL ONCE
Status: COMPLETED | OUTPATIENT
Start: 2018-12-11 | End: 2018-12-11

## 2018-12-11 RX ADMIN — CLINDAMYCIN HYDROCHLORIDE 450 MG: 150 CAPSULE ORAL at 04:35

## 2018-12-11 RX ADMIN — BENZOCAINE 1 EACH: 220 GEL, DENTIFRICE DENTAL at 04:35

## 2018-12-11 ASSESSMENT — ENCOUNTER SYMPTOMS
NAUSEA: 0
COUGH: 0
TROUBLE SWALLOWING: 0
ABDOMINAL PAIN: 0
VOMITING: 0
CHEST TIGHTNESS: 0
WHEEZING: 0
SORE THROAT: 0
SHORTNESS OF BREATH: 0
PHOTOPHOBIA: 0
BACK PAIN: 0

## 2018-12-11 NOTE — ED PROVIDER NOTES
0       DDX: Epiglottitis, tod's angina, retropharyngeal abscess/cellulitis, parapharyngeal abscess, peritonsillar abscess, mononucleosis, carotid dissection/aneurysm, alveolar osteitis (dry socket), pharyngitis, URI, foreign body aspiration or ingestion, trauma, dental cavitis, post-extraction pain, TMJ pain    Evaluate for: fever, sweats, chills, signs or symptoms of significant infection or abscess, stridor, difficulty swallowing, airway compromise, Minimal redness, exudates, swelling/ lesions, adenopathy. DIAGNOSTIC RESULTS / EMERGENCY DEPARTMENT COURSE / MDM     LABS:  No results found for this visit on 12/11/18. RADIOLOGY:  None    EKG  None    All EKG's are interpreted by the Emergency Department Physician who either signs or Co-signs this chart in the absence of a cardiologist.    EMERGENCY DEPARTMENT COURSE:  ED Course as of Dec 11 0455   Tue Dec 11, 2018   0441 Pt complaint right-sided dental pain. .  Patient has dental appointment scheduled for tooth extraction. She states that had multiple abscesses in the past.  Patient denying any fevers, chills, sweats, nausea, vomiting. Patient tolerating oral solids and fluids. [AD]   9768 pt allergic to penicillin, we'll give clindamycin. Patient also received lidocaine topical anesthetic. Advised patient to change dental appointment to a sooner date. Patient without any significant periodontal swelling. No uvular deviation, peritonsillar abscess. Patient tolerating secretions. Vital signs stable. We'll discharge patient with return precautions and follow-up instructions. [AD]      ED Course User Index  [AD] García Crowley MD         PROCEDURES:  None    CONSULTS:  None    CRITICAL CARE:  None    FINAL IMPRESSION      1.  Pain, dental          DISPOSITION / PLAN     DISPOSITION Discharge - Pending Orders Complete 12/11/2018 04:32:20 AM      PATIENT REFERRED TO:  Adam Hubbard, DO  [id number]  [phone]    Schedule an appointment as soon as possible for a visit in 1 day  For Follow-up      DISCHARGE MEDICATIONS:  New Prescriptions    BENZOCAINE (LOLLICAINE) 20 % SWAB DENTAL SWAB    Take 1 each by mouth 3 times daily as needed for Pain    CLINDAMYCIN (CLEOCIN) 150 MG CAPSULE    Take 3 capsules by mouth 3 times daily for 7 days       Isabel Ghosh MD  Emergency Medicine Resident    (Please note that portions of thisnote were completed with a voice recognition program.  Efforts were made to edit the dictations but occasionally words are mis-transcribed.)         Isabel Ghosh MD  12/11/18 2662

## 2018-12-19 ENCOUNTER — HOSPITAL ENCOUNTER (EMERGENCY)
Age: 48
Discharge: HOME | End: 2018-12-19
Attending: EMERGENCY MEDICINE
Payer: MEDICARE

## 2018-12-19 ENCOUNTER — APPOINTMENT (OUTPATIENT)
Dept: GENERAL RADIOLOGY | Age: 48
End: 2018-12-19
Payer: MEDICARE

## 2018-12-19 VITALS
RESPIRATION RATE: 18 BRPM | BODY MASS INDEX: 35.73 KG/M2 | HEART RATE: 95 BPM | WEIGHT: 235 LBS | DIASTOLIC BLOOD PRESSURE: 89 MMHG | SYSTOLIC BLOOD PRESSURE: 135 MMHG | OXYGEN SATURATION: 100 % | TEMPERATURE: 99.3 F

## 2018-12-19 VITALS
HEART RATE: 98 BPM | BODY MASS INDEX: 35.61 KG/M2 | RESPIRATION RATE: 14 BRPM | TEMPERATURE: 97.5 F | OXYGEN SATURATION: 94 % | WEIGHT: 235 LBS | HEIGHT: 68 IN | SYSTOLIC BLOOD PRESSURE: 138 MMHG | DIASTOLIC BLOOD PRESSURE: 84 MMHG

## 2018-12-19 DIAGNOSIS — H65.93 BILATERAL NON-SUPPURATIVE OTITIS MEDIA: ICD-10-CM

## 2018-12-19 DIAGNOSIS — M25.562 ACUTE PAIN OF LEFT KNEE: Primary | ICD-10-CM

## 2018-12-19 DIAGNOSIS — G89.29 CHRONIC PAIN OF LEFT KNEE: Primary | ICD-10-CM

## 2018-12-19 DIAGNOSIS — M25.562 CHRONIC PAIN OF LEFT KNEE: Primary | ICD-10-CM

## 2018-12-19 PROCEDURE — 99283 EMERGENCY DEPT VISIT LOW MDM: CPT

## 2018-12-19 PROCEDURE — 99282 EMERGENCY DEPT VISIT SF MDM: CPT

## 2018-12-19 PROCEDURE — 73562 X-RAY EXAM OF KNEE 3: CPT

## 2018-12-19 RX ORDER — AMOXICILLIN 500 MG/1
500 CAPSULE ORAL 3 TIMES DAILY
Qty: 30 CAPSULE | Refills: 0 | Status: SHIPPED | OUTPATIENT
Start: 2018-12-19 | End: 2018-12-19

## 2018-12-19 RX ORDER — FLUCONAZOLE 100 MG/1
100 TABLET ORAL DAILY
Qty: 10 TABLET | Refills: 0 | Status: SHIPPED | OUTPATIENT
Start: 2018-12-19 | End: 2018-12-19

## 2018-12-19 RX ORDER — AMOXICILLIN 500 MG/1
500 CAPSULE ORAL 3 TIMES DAILY
Qty: 30 CAPSULE | Refills: 0 | Status: SHIPPED | OUTPATIENT
Start: 2018-12-19 | End: 2018-12-19 | Stop reason: CLARIF

## 2018-12-19 RX ORDER — ACETAMINOPHEN 325 MG/1
650 TABLET ORAL EVERY 6 HOURS PRN
Qty: 45 TABLET | Refills: 0 | Status: SHIPPED | OUTPATIENT
Start: 2018-12-19 | End: 2019-08-30

## 2018-12-19 RX ORDER — FLUCONAZOLE 100 MG/1
100 TABLET ORAL DAILY
Qty: 10 TABLET | Refills: 0 | Status: SHIPPED | OUTPATIENT
Start: 2018-12-19 | End: 2018-12-19 | Stop reason: CLARIF

## 2018-12-19 ASSESSMENT — ENCOUNTER SYMPTOMS
VOMITING: 0
CHEST TIGHTNESS: 0
EYE REDNESS: 0
CHOKING: 0
EYE PAIN: 0
RHINORRHEA: 1
EYE DISCHARGE: 0
ABDOMINAL DISTENTION: 0
DIARRHEA: 0
EYE ITCHING: 0
ABDOMINAL PAIN: 0
BACK PAIN: 0
NAUSEA: 0
RHINORRHEA: 0
SHORTNESS OF BREATH: 0
SINUS PAIN: 0
EYE ITCHING: 0
BACK PAIN: 0
APNEA: 0
COUGH: 0
ABDOMINAL PAIN: 0

## 2018-12-19 ASSESSMENT — PAIN DESCRIPTION - LOCATION
LOCATION: KNEE
LOCATION: KNEE

## 2018-12-19 ASSESSMENT — PAIN DESCRIPTION - PROGRESSION: CLINICAL_PROGRESSION: GRADUALLY WORSENING

## 2018-12-19 ASSESSMENT — PAIN DESCRIPTION - ORIENTATION
ORIENTATION: LEFT
ORIENTATION: LEFT

## 2018-12-19 ASSESSMENT — PAIN DESCRIPTION - PAIN TYPE
TYPE: ACUTE PAIN
TYPE: ACUTE PAIN

## 2018-12-19 ASSESSMENT — PAIN DESCRIPTION - ONSET: ONSET: GRADUAL

## 2018-12-19 ASSESSMENT — PAIN SCALES - GENERAL
PAINLEVEL_OUTOF10: 10
PAINLEVEL_OUTOF10: 8

## 2018-12-19 ASSESSMENT — PAIN DESCRIPTION - FREQUENCY: FREQUENCY: CONTINUOUS

## 2018-12-19 ASSESSMENT — PAIN DESCRIPTION - DESCRIPTORS: DESCRIPTORS: THROBBING

## 2019-04-16 ENCOUNTER — APPOINTMENT (OUTPATIENT)
Dept: GENERAL RADIOLOGY | Age: 49
End: 2019-04-16
Payer: MEDICARE

## 2019-04-16 ENCOUNTER — HOSPITAL ENCOUNTER (EMERGENCY)
Age: 49
Discharge: HOME | End: 2019-04-16
Attending: EMERGENCY MEDICINE
Payer: MEDICARE

## 2019-04-16 VITALS
TEMPERATURE: 98.4 F | BODY MASS INDEX: 35.77 KG/M2 | SYSTOLIC BLOOD PRESSURE: 145 MMHG | OXYGEN SATURATION: 97 % | WEIGHT: 236 LBS | HEART RATE: 85 BPM | HEIGHT: 68 IN | RESPIRATION RATE: 19 BRPM | DIASTOLIC BLOOD PRESSURE: 96 MMHG

## 2019-04-16 DIAGNOSIS — M25.462 EFFUSION OF LEFT KNEE: Primary | ICD-10-CM

## 2019-04-16 PROCEDURE — 99283 EMERGENCY DEPT VISIT LOW MDM: CPT

## 2019-04-16 PROCEDURE — 6370000000 HC RX 637 (ALT 250 FOR IP): Performed by: EMERGENCY MEDICINE

## 2019-04-16 PROCEDURE — 73562 X-RAY EXAM OF KNEE 3: CPT

## 2019-04-16 RX ORDER — DIPHENHYDRAMINE HCL 25 MG
25 TABLET ORAL ONCE
Status: COMPLETED | OUTPATIENT
Start: 2019-04-16 | End: 2019-04-16

## 2019-04-16 RX ORDER — HYDROCODONE BITARTRATE AND ACETAMINOPHEN 5; 325 MG/1; MG/1
1 TABLET ORAL ONCE
Status: COMPLETED | OUTPATIENT
Start: 2019-04-16 | End: 2019-04-16

## 2019-04-16 RX ADMIN — DIPHENHYDRAMINE HCL 25 MG: 25 TABLET ORAL at 14:23

## 2019-04-16 RX ADMIN — HYDROCODONE BITARTRATE AND ACETAMINOPHEN 1 TABLET: 5; 325 TABLET ORAL at 14:23

## 2019-04-16 ASSESSMENT — PAIN DESCRIPTION - LOCATION: LOCATION: KNEE

## 2019-04-16 ASSESSMENT — ENCOUNTER SYMPTOMS
VOMITING: 0
NAUSEA: 0
EYE PAIN: 0
SORE THROAT: 0
DIARRHEA: 0
SHORTNESS OF BREATH: 0
EYE DISCHARGE: 0
ABDOMINAL PAIN: 0
COUGH: 0

## 2019-04-16 ASSESSMENT — PAIN DESCRIPTION - DESCRIPTORS: DESCRIPTORS: DISCOMFORT;ACHING

## 2019-04-16 ASSESSMENT — PAIN DESCRIPTION - PAIN TYPE: TYPE: ACUTE PAIN;CHRONIC PAIN

## 2019-04-16 ASSESSMENT — PAIN DESCRIPTION - ORIENTATION: ORIENTATION: RIGHT

## 2019-04-16 ASSESSMENT — PAIN SCALES - GENERAL: PAINLEVEL_OUTOF10: 8

## 2019-04-16 NOTE — ED PROVIDER NOTES
Emergency Medicine Attending Note    I have seen and examined the patient in conjunction with the Resident/MLP. Please see my key portion documented:      I agree with the assessment and plan as discussed with Dr. Sharla Bosworth. Electronically signed:  DEAN Landeros MD  04/16/19 2783

## 2019-04-16 NOTE — ED PROVIDER NOTES
Non-medical: Not on file   Tobacco Use    Smoking status: Never Smoker    Smokeless tobacco: Never Used   Substance and Sexual Activity    Alcohol use: Yes     Comment: occasionally    Drug use: Yes     Types: Marijuana    Sexual activity: Not on file   Lifestyle    Physical activity:     Days per week: Not on file     Minutes per session: Not on file    Stress: Not on file   Relationships    Social connections:     Talks on phone: Not on file     Gets together: Not on file     Attends Hoahaoism service: Not on file     Active member of club or organization: Not on file     Attends meetings of clubs or organizations: Not on file     Relationship status: Not on file    Intimate partner violence:     Fear of current or ex partner: Not on file     Emotionally abused: Not on file     Physically abused: Not on file     Forced sexual activity: Not on file   Other Topics Concern    Not on file   Social History Narrative    Not on file       Family History   Problem Relation Age of Onset    Cancer Mother         breast and bone    Heart Disease Father     Diabetes Sister     High Blood Pressure Sister     Diabetes Brother     High Blood Pressure Brother     Heart Disease Maternal Grandmother     Cancer Maternal Grandmother         breast    Cancer Paternal Grandmother     Heart Disease Paternal Grandfather        Allergies:  Latex; Dye [iodides]; Motrin [ibuprofen]; Tramadol; and Naproxen    Home Medications:  Prior to Admission medications    Medication Sig Start Date End Date Taking?  Authorizing Provider   Elastic Bandages & Supports (KNEE BRACE ADJUSTABLE HINGED) MISC 1 Device by Does not apply route as needed (comfort) 12/19/18   Leandro Altman MD   acetaminophen (TYLENOL) 325 MG tablet Take 2 tablets by mouth every 6 hours as needed for Pain 12/19/18   Leandro Altman MD   cephALEXin (KEFLEX) 500 MG capsule Take 500 mg by mouth 4 times daily    Historical Provider, MD   benzocaine (LOLLICAINE) 20 % SWAB dental swab Take 1 each by mouth 3 times daily as needed for Pain 12/11/18   Aren Helms MD   AMITRIPTYLINE HCL PO Take by mouth    Historical Provider, MD   dicyclomine (BENTYL) 10 MG capsule Take 1 capsule by mouth every 6 hours as needed (cramps) 5/26/18   Demetris Mater, DO   metoclopramide (REGLAN) 10 MG tablet Take 1 tablet by mouth 3 times daily as needed (abd pain/spasm) 5/26/18   Demetris Mater, DO   HYDROcodone-acetaminophen [facility]) 5-325 MG per tablet Take 1 tablet by mouth every 8 hours as needed for Pain . 7/21/17   Sallie Talavera MD   [facility]. Devices (WRIST BRACE) [facility] 1 Device by Does not apply route daily One brace to be worn at night and daily as needed for carpal tunnel syndrome. Brace should place the wrist in neutral. 2/20/17   Jack Baker, DO   EPINEPHrine (EPIPEN) 0.3 MG/0.3ML SOAJ injection Inject 0.3 mg into the muscle as needed Use as directed for allergic reaction    Historical Provider, MD   divalproex (DEPAKOTE) 500 MG DR tablet Take 1 tablet by mouth daily 9/13/16   Alexandra Andrew MD   metoprolol tartrate (LOPRESSOR) 50 MG tablet 2 IN AM AND 1 AT NIGHT 4/26/16   Historical Provider, MD   divalproex (DEPAKOTE ER) 500 MG ER tablet Take 1 tablet by mouth nightly 5/20/16   [address] Blood, DO   Elastic Bandages & Supports (TENNIS ELBOW SUPPORT) [facility] 1 Device by Does not apply route as needed 6/2/15   Martine Giron MD   pravastatin (PRAVACHOL) 20 MG tablet Take 20 mg by mouth daily. Historical Provider, MD   albuterol (VENTOLIN HFA) 108 (90 BASE) MCG/ACT inhaler Inhale 2 puffs into the lungs every 6 hours as needed for Wheezing. 9/9/14   Kameron Hanson, DO   ondansetron (ZOFRAN) 4 MG tablet Take 1 tablet by mouth every 8 hours as needed for Nausea. 8/8/14   Julia Guaman, DO   indomethacin (INDOCIN) 50 MG capsule Take 1 capsule by mouth 3 times daily (with meals). 7/26/14   Adiel Rainey MD   citalopram (CELEXA) 20 MG tablet Take 40 mg by mouth daily. Historical Provider, MD   losartan (COZAAR) 50 MG tablet Take 100 mg by mouth daily. Historical Provider, MD       REVIEW OF SYSTEMS    (2-9 systems for level 4, 10 or more for level 5)      Review of Systems   Constitutional: Negative for chills, diaphoresis and fever. HENT: Negative for congestion and sore throat. Eyes: Negative for pain and discharge. Respiratory: Negative for cough and shortness of breath. Cardiovascular: Negative for chest pain and leg swelling. Gastrointestinal: Negative for abdominal pain, diarrhea, nausea and vomiting. Endocrine: Negative for polydipsia and polyuria. Genitourinary: Negative for dysuria and hematuria. Musculoskeletal: Negative for neck pain and neck stiffness. Skin: Negative for pallor and rash. Allergic/Immunologic: Negative for environmental allergies and food allergies. Neurological: Negative for numbness and headaches. Hematological: Negative for adenopathy. Does not bruise/bleed easily. Psychiatric/Behavioral: Negative for hallucinations and suicidal ideas. PHYSICAL EXAM   (up to 7 for level 4, 8 or more for level 5)      INITIAL VITALS:   BP (!) 145/96   Pulse 85   Temp 98.4 °F (36.9 °C) (Oral)   Resp 19   Ht 5' 8\" (1.727 m)   Wt 236 lb (107 kg)   SpO2 97%   BMI 35.88 kg/m²     Physical Exam   Constitutional: She is oriented to person, place, and time. She appears well-developed and well-nourished. Patient appears well, nontoxic   HENT:   Head: Normocephalic and atraumatic. Mouth/Throat: Oropharynx is clear and moist.   Eyes: Pupils are equal, round, and reactive to light. Conjunctivae are normal.   Neck: Normal range of motion. Neck supple. Cardiovascular: Normal rate and regular rhythm. Exam reveals no gallop and no friction rub. No murmur heard. Pulmonary/Chest: Effort normal and breath sounds normal. No respiratory distress. She has no wheezes. She has no rales. Abdominal: Soft.  Bowel sounds are normal. There is no tenderness. There is no rebound and no guarding. Musculoskeletal: Normal range of motion. She exhibits no edema. Pain and swelling with effusion to the left knee, mild overlying erythema, no warmth to the joint, full passive and active range of motion, neurovascularly intact left lower extremity   Neurological: She is alert and oriented to person, place, and time. She has normal reflexes. Skin: Skin is warm and dry. No rash noted. Psychiatric: She has a normal mood and affect. Thought content normal.   Nursing note and vitals reviewed. DIFFERENTIAL  DIAGNOSIS     PLAN (LABS / IMAGING / EKG):  Orders Placed This Encounter   Procedures    XR KNEE LEFT (3 VIEWS)       MEDICATIONS ORDERED:  Orders Placed This Encounter   Medications    HYDROcodone-acetaminophen (NORCO) 5-325 MG per tablet 1 tablet    diphenhydrAMINE (BENADRYL) tablet 25 mg       DDX: Osteoarthritis, septic arthritis, rheumatoid arthritis, gout    DIAGNOSTIC RESULTS / EMERGENCY DEPARTMENT COURSE / MDM     LABS:  No results found for this visit on 04/16/19. IMPRESSION: 52-year-old female with chronic left knee pain presents with 3 days of increased swelling, pain to the left knee. Patient appears well, no warmth to the joint, very minimal erythema, doubt septic arthritis. Patient has had previous imaging showing osteoarthritis and chondrocalcinosis, most likely osteoarthritis flare up. We'll check x-ray, pain management    RADIOLOGY:  XR KNEE LEFT (3 VIEWS)   Final Result   Osteoarthrosis. Chondrocalcinosis which can be seen in CPPD arthropathy.                EKG  None    All EKG's are interpreted by the Emergency Department Physician who either signs or Co-signs this chart in the absence of a cardiologist.    EMERGENCY DEPARTMENT COURSE:  X-ray reviewed, chronic changes without any acute bony abnormality, therapeutic arthrocentesis done, patient feels much better following, fluid does not appear infected, 18 mL removed, performed in sterile fashion, knee wrapped, discussed follow-up with orthopedic surgery, return precautions, treatment plan, patient understands and agrees with discharge plan, stable gait    PROCEDURES:  Arthrocentesis Procedure Note    Indication: Joint pain    Consent: The patient provided verbal consent for this procedure, understands risks including bleeding, infection, pain, tendon injury. Procedure: The left knee was positioned appropriately and the landmarks were identified. Local anesthesia was obtained by infiltration using 1% Lidocaine without epinephrine. The area was then prepped and draped in the usual sterile fashion. A needle was then introduced into the joint space at which point 18 cc of fluid was removed. The aspirated fluid was discarded. A sterile dressing was then applied to the site. The patient tolerated the procedure well. Complications: None        CONSULTS:  None    CRITICAL CARE:  None    FINAL IMPRESSION      1.  Effusion of left knee          DISPOSITION / PLAN     DISPOSITION Decision To Discharge 04/16/2019 03:10:00 PM      PATIENT REFERRED TO:  [address], [address] [place] [address]  Schedule an appointment as soon as possible for a visit in 2 days        DISCHARGE MEDICATIONS:  Discharge Medication List as of 4/16/2019  3:11 PM          Christy Cavazos DO  Emergency Medicine Resident    (Please note that portions of thisnote were completed with a voice recognition program.  Efforts were made to edit the dictations but occasionally words are mis-transcribed.)       Christy Cavazos DO  Resident  04/16/19 2774

## 2019-04-18 ENCOUNTER — HOSPITAL ENCOUNTER (OUTPATIENT)
Age: 49
Setting detail: OBSERVATION
Discharge: HOME | End: 2019-04-20
Attending: EMERGENCY MEDICINE | Admitting: EMERGENCY MEDICINE
Payer: MEDICARE

## 2019-04-18 DIAGNOSIS — M25.462 KNEE EFFUSION, LEFT: Primary | ICD-10-CM

## 2019-04-18 DIAGNOSIS — M25.462 EFFUSION OF LEFT KNEE JOINT: ICD-10-CM

## 2019-04-18 DIAGNOSIS — R26.2 INABILITY TO AMBULATE DUE TO KNEE: ICD-10-CM

## 2019-04-18 LAB
ABSOLUTE EOS #: 0.13 K/UL (ref 0–0.44)
ABSOLUTE IMMATURE GRANULOCYTE: <0.03 K/UL (ref 0–0.3)
ABSOLUTE LYMPH #: 2.26 K/UL (ref 1.1–3.7)
ABSOLUTE MONO #: 0.52 K/UL (ref 0.1–1.2)
BASOPHILS # BLD: 0 % (ref 0–2)
BASOPHILS ABSOLUTE: <0.03 K/UL (ref 0–0.2)
C-REACTIVE PROTEIN: 45.5 MG/L (ref 0–5)
DIFFERENTIAL TYPE: ABNORMAL
EOSINOPHILS RELATIVE PERCENT: 2 % (ref 1–4)
HCT VFR BLD CALC: 41.8 % (ref 36.3–47.1)
HEMOGLOBIN: 12.7 G/DL (ref 11.9–15.1)
IMMATURE GRANULOCYTES: 0 %
LYMPHOCYTES # BLD: 28 % (ref 24–43)
MCH RBC QN AUTO: 22.3 PG (ref 25.2–33.5)
MCHC RBC AUTO-ENTMCNC: 30.4 G/DL (ref 28.4–34.8)
MCV RBC AUTO: 73.5 FL (ref 82.6–102.9)
MONOCYTES # BLD: 6 % (ref 3–12)
NRBC AUTOMATED: 0 PER 100 WBC
PDW BLD-RTO: 13.8 % (ref 11.8–14.4)
PLATELET # BLD: 277 K/UL (ref 138–453)
PLATELET ESTIMATE: ABNORMAL
PMV BLD AUTO: 11.1 FL (ref 8.1–13.5)
RBC # BLD: 5.69 M/UL (ref 3.95–5.11)
RBC # BLD: ABNORMAL 10*6/UL
SEDIMENTATION RATE, ERYTHROCYTE: 41 MM (ref 0–20)
SEG NEUTROPHILS: 64 % (ref 36–65)
SEGMENTED NEUTROPHILS ABSOLUTE COUNT: 5.22 K/UL (ref 1.5–8.1)
WBC # BLD: 8.2 K/UL (ref 3.5–11.3)
WBC # BLD: ABNORMAL 10*3/UL

## 2019-04-18 PROCEDURE — 89060 EXAM SYNOVIAL FLUID CRYSTALS: CPT

## 2019-04-18 PROCEDURE — 85651 RBC SED RATE NONAUTOMATED: CPT

## 2019-04-18 PROCEDURE — 87070 CULTURE OTHR SPECIMN AEROBIC: CPT

## 2019-04-18 PROCEDURE — 85025 COMPLETE CBC W/AUTO DIFF WBC: CPT

## 2019-04-18 PROCEDURE — 89051 BODY FLUID CELL COUNT: CPT

## 2019-04-18 PROCEDURE — 87075 CULTR BACTERIA EXCEPT BLOOD: CPT

## 2019-04-18 PROCEDURE — G0378 HOSPITAL OBSERVATION PER HR: HCPCS

## 2019-04-18 PROCEDURE — 99284 EMERGENCY DEPT VISIT MOD MDM: CPT

## 2019-04-18 PROCEDURE — 6370000000 HC RX 637 (ALT 250 FOR IP): Performed by: PHYSICIAN ASSISTANT

## 2019-04-18 PROCEDURE — 86140 C-REACTIVE PROTEIN: CPT

## 2019-04-18 PROCEDURE — 87205 SMEAR GRAM STAIN: CPT

## 2019-04-18 PROCEDURE — 6370000000 HC RX 637 (ALT 250 FOR IP): Performed by: EMERGENCY MEDICINE

## 2019-04-18 RX ORDER — HYDROCODONE BITARTRATE AND ACETAMINOPHEN 5; 325 MG/1; MG/1
1 TABLET ORAL ONCE
Status: COMPLETED | OUTPATIENT
Start: 2019-04-18 | End: 2019-04-18

## 2019-04-18 RX ORDER — DIPHENHYDRAMINE HCL 25 MG
25 TABLET ORAL ONCE
Status: COMPLETED | OUTPATIENT
Start: 2019-04-18 | End: 2019-04-18

## 2019-04-18 RX ADMIN — DIPHENHYDRAMINE HCL 25 MG: 25 TABLET ORAL at 20:43

## 2019-04-18 RX ADMIN — HYDROCODONE BITARTRATE AND ACETAMINOPHEN 1 TABLET: 5; 325 TABLET ORAL at 22:50

## 2019-04-18 RX ADMIN — HYDROCODONE BITARTRATE AND ACETAMINOPHEN 1 TABLET: 5; 325 TABLET ORAL at 20:43

## 2019-04-18 ASSESSMENT — ENCOUNTER SYMPTOMS
DIARRHEA: 0
VOMITING: 0
SHORTNESS OF BREATH: 0
ABDOMINAL PAIN: 0
COLOR CHANGE: 0
COUGH: 0
BACK PAIN: 0
SORE THROAT: 0
NAUSEA: 0

## 2019-04-18 ASSESSMENT — PAIN SCALES - GENERAL
PAINLEVEL_OUTOF10: 10
PAINLEVEL_OUTOF10: 8
PAINLEVEL_OUTOF10: 10

## 2019-04-19 LAB
APPEARANCE FLUID: NORMAL
BASO FLUID: NORMAL %
COLOR FLUID: NORMAL
CRYSTALS, FLUID: NEGATIVE
EOSINOPHIL FLUID: NORMAL %
FLUID DIFF COMMENT: NORMAL
LYMPHOCYTES, BODY FLUID: 4 %
MONOCYTE, FLUID: NORMAL %
NEUTROPHIL, FLUID: 89 %
OTHER CELLS FLUID: NORMAL %
RBC FLUID: <3000 /MM3
SPECIMEN TYPE: NORMAL
SPECIMEN TYPE: NORMAL
WBC FLUID: 1450 /MM3

## 2019-04-19 PROCEDURE — 6360000002 HC RX W HCPCS: Performed by: EMERGENCY MEDICINE

## 2019-04-19 PROCEDURE — 2580000003 HC RX 258: Performed by: EMERGENCY MEDICINE

## 2019-04-19 PROCEDURE — 6370000000 HC RX 637 (ALT 250 FOR IP): Performed by: EMERGENCY MEDICINE

## 2019-04-19 PROCEDURE — 96374 THER/PROPH/DIAG INJ IV PUSH: CPT

## 2019-04-19 PROCEDURE — 96376 TX/PRO/DX INJ SAME DRUG ADON: CPT

## 2019-04-19 PROCEDURE — G0378 HOSPITAL OBSERVATION PER HR: HCPCS

## 2019-04-19 PROCEDURE — 6370000000 HC RX 637 (ALT 250 FOR IP): Performed by: STUDENT IN AN ORGANIZED HEALTH CARE EDUCATION/TRAINING PROGRAM

## 2019-04-19 RX ORDER — HYDROCODONE BITARTRATE AND ACETAMINOPHEN 5; 325 MG/1; MG/1
1 TABLET ORAL EVERY 4 HOURS PRN
Status: DISCONTINUED | OUTPATIENT
Start: 2019-04-19 | End: 2019-04-20 | Stop reason: HOSPADM

## 2019-04-19 RX ORDER — SODIUM CHLORIDE 0.9 % (FLUSH) 0.9 %
10 SYRINGE (ML) INJECTION EVERY 12 HOURS SCHEDULED
Status: DISCONTINUED | OUTPATIENT
Start: 2019-04-19 | End: 2019-04-20 | Stop reason: HOSPADM

## 2019-04-19 RX ORDER — HYDROXYZINE HYDROCHLORIDE 10 MG/1
10 TABLET, FILM COATED ORAL 3 TIMES DAILY PRN
Status: DISCONTINUED | OUTPATIENT
Start: 2019-04-19 | End: 2019-04-20 | Stop reason: HOSPADM

## 2019-04-19 RX ORDER — SODIUM CHLORIDE 0.9 % (FLUSH) 0.9 %
10 SYRINGE (ML) INJECTION PRN
Status: DISCONTINUED | OUTPATIENT
Start: 2019-04-19 | End: 2019-04-20 | Stop reason: HOSPADM

## 2019-04-19 RX ORDER — ONDANSETRON 2 MG/ML
4 INJECTION INTRAMUSCULAR; INTRAVENOUS EVERY 8 HOURS PRN
Status: DISCONTINUED | OUTPATIENT
Start: 2019-04-19 | End: 2019-04-20 | Stop reason: HOSPADM

## 2019-04-19 RX ORDER — METOPROLOL TARTRATE 50 MG/1
50 TABLET, FILM COATED ORAL 2 TIMES DAILY
Status: DISCONTINUED | OUTPATIENT
Start: 2019-04-19 | End: 2019-04-20 | Stop reason: HOSPADM

## 2019-04-19 RX ADMIN — Medication 10 ML: at 21:10

## 2019-04-19 RX ADMIN — METOPROLOL TARTRATE 50 MG: 50 TABLET, FILM COATED ORAL at 10:58

## 2019-04-19 RX ADMIN — METOPROLOL TARTRATE 50 MG: 50 TABLET, FILM COATED ORAL at 21:08

## 2019-04-19 RX ADMIN — HYDROCODONE BITARTRATE AND ACETAMINOPHEN 1 TABLET: 5; 325 TABLET ORAL at 19:39

## 2019-04-19 RX ADMIN — HYDROCODONE BITARTRATE AND ACETAMINOPHEN 1 TABLET: 5; 325 TABLET ORAL at 02:11

## 2019-04-19 RX ADMIN — HYDROCODONE BITARTRATE AND ACETAMINOPHEN 1 TABLET: 5; 325 TABLET ORAL at 07:30

## 2019-04-19 RX ADMIN — Medication 10 ML: at 11:02

## 2019-04-19 RX ADMIN — HYDROCODONE BITARTRATE AND ACETAMINOPHEN 1 TABLET: 5; 325 TABLET ORAL at 14:16

## 2019-04-19 RX ADMIN — ONDANSETRON 4 MG: 2 INJECTION INTRAMUSCULAR; INTRAVENOUS at 21:16

## 2019-04-19 RX ADMIN — ONDANSETRON 4 MG: 2 INJECTION INTRAMUSCULAR; INTRAVENOUS at 07:30

## 2019-04-19 RX ADMIN — HYDROXYZINE HYDROCHLORIDE 10 MG: 10 TABLET ORAL at 19:38

## 2019-04-19 RX ADMIN — METOPROLOL TARTRATE 50 MG: 50 TABLET, FILM COATED ORAL at 02:11

## 2019-04-19 ASSESSMENT — PAIN SCALES - GENERAL
PAINLEVEL_OUTOF10: 8
PAINLEVEL_OUTOF10: 9
PAINLEVEL_OUTOF10: 6
PAINLEVEL_OUTOF10: 9
PAINLEVEL_OUTOF10: 7

## 2019-04-19 ASSESSMENT — PAIN DESCRIPTION - ORIENTATION: ORIENTATION: LEFT

## 2019-04-19 ASSESSMENT — PAIN DESCRIPTION - LOCATION: LOCATION: LEG

## 2019-04-19 ASSESSMENT — PAIN DESCRIPTION - PAIN TYPE: TYPE: ACUTE PAIN

## 2019-04-19 NOTE — DISCHARGE INSTR - COC
Continuity of Care Form    Patient Name: Ankur Olivarez   :  1970  MRN:  [id number]    Admit date:  2019  Discharge date:      Code Status Order: Full Code   Advance Directives:     Admitting Physician:  Fay Orta MD  PCP: Judi Salvador DO    Discharging Nurse: [facility] Unit/Room#: 2568/3155-69  Discharging Unit Phone Number: [phone]    Emergency Contact:   Extended Emergency Contact Information  Primary Emergency Contact: Ryan Ott [address] Phone: [phone]  Relation: Brother/Sister  Secondary Emergency Contact: [id number] Phone: [phone]  Relation: Brother/Sister    Past Surgical History:  Past Surgical History:   Procedure Laterality Date    ADENOIDECTOMY      BREAST SURGERY      CARDIAC CATHETERIZATION  2015    COSMETIC SURGERY      nose    HYSTERECTOMY      KNEE SURGERY      SINUS SURGERY      TONSILLECTOMY         Immunization History: There is no immunization history on file for this patient. Active Problems:  Patient Active Problem List   Diagnosis Code    Chest pain R07.9    Abdominal pain R10.9    Gastroparesis K31.84    Hypertension I10    Asthma J45.909    Neuropathy G62.9    Fibromyalgia M79.7    Depression F32.9    S/P cardiac cath- Normal 7/14/15 -= Dr. 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 Onset Date Added Added By Resolved Resolved By Review Date    ESBL (Extended Spectrum Beta Lactamase) No  14 Adriano Green RN       E.  Coli urine          Nurse Assessment:  Last Vital Signs: BP (!) 151/78   Pulse 65   Temp 98 °F (36.7 °C) (Oral)   Resp 18   SpO2 100%     Last documented pain score (0-10 scale): Pain Level: 8  Last Weight:   Wt Readings from Last 1 Encounters:   04/16/19 236 lb (107 kg)     Mental Status:  oriented    IV Access:  - None    Nursing Mobility/ADLs:  Walking   Independent  Transfer  Independent  Bathing  Independent  Dressing  Independent  Toileting  Independent  Feeding  Independent  Med Admin  {CHP DME AEFC:[id number]}  Med Delivery   whole    Wound Care Documentation and Therapy:        Elimination:  Continence:   · Bowel: Yes  · Bladder: Yes  Urinary Catheter: None   Colostomy/Ileostomy/Ileal Conduit: Yes and No       Date of Last BM: ***  No intake or output data in the 24 hours ending 04/19/19 1336  No intake/output data recorded. Safety Concerns: At Risk for Falls    Impairments/Disabilities:      [address] [place] [address] Impairments/Disabilities:[id number]}    Nutrition Therapy:  Current Nutrition Therapy:   - Oral Diet:  General    Routes of Feeding: Oral  Liquids: No Restrictions  Daily Fluid Restriction: no  Last Modified Barium Swallow with Video (Video Swallowing Test): not done    Treatments at the Time of Hospital Discharge:   Respiratory Treatments: ***  Oxygen Therapy:  is not on home oxygen therapy.   Ventilator:    - No ventilator support    Rehab Therapies: Physical Therapy  Weight Bearing Status/Restrictions: No weight bearing restirctions  Other Medical Equipment (for information only, NOT a DME order):  walker  Other Treatments: ***    Patient's personal belongings (please select all that are sent with patient):  None    RN SIGNATURE:  Electronically signed by Oneil Win RN on 4/20/19 at 11:13 AM    CASE MANAGEMENT/SOCIAL WORK SECTION    Inpatient Status Date: ***    Readmission Risk Assessment Score:  Readmission Risk              Risk of Unplanned Readmission:        8           Discharging to Facility/ Agency   · Name:   · Address:  · Phone:  · Fax:    Dialysis Facility (if applicable)   · Name:  · Address:  · Dialysis Schedule:  · Phone:  · Fax:    / signature: {Esignature:[id number]}    PHYSICIAN SECTION    Prognosis: Good    Condition at Discharge: Stable    Rehab Potential (if transferring to Rehab): Good    Recommended Labs or Other Treatments After Discharge: ***    Physician Certification: I certify the above information and transfer of Kaley Santos  is necessary for the continuing treatment of the diagnosis listed and that she requires Home Care for less 30 days.      Update Admission H&P: No change in H&P    PHYSICIAN SIGNATURE:  Electronically signed by Soraida Harley MD on 4/19/19 at 1:36 PM

## 2019-04-19 NOTE — ED PROVIDER NOTES
[facility] ED     Emergency Department     Faculty Attestation    I performed a history and physical examination of the patient and discussed management with the resident. I reviewed the residents note and agree with the documented findings and plan of care. Any areas of disagreement are noted on the chart. I was personally present for the key portions of any procedures. I have documented in the chart those procedures where I was not present during the key portions. I have reviewed the emergency nurses triage note. I agree with the chief complaint, past medical history, past surgical history, allergies, medications, social and family history as documented unless otherwise noted below. For Physician Assistant/ Nurse Practitioner cases/documentation I have personally evaluated this patient and have completed at least one if not all key elements of the E/M (history, physical exam, and MDM). Additional findings are as noted. Patient presents complaining of knee pain. This has been a chronic problem for her but has worsened over the past couple of days. Patient was seen here a couple of days ago and she says that a couple of syringes full of fluid was drained off the knee at that time. She also had an x-ray done which was unremarkable. Patient denies fever, chills, nausea or vomiting. Patient says she is having difficulty getting around due to the pain. On exam, patient is resting comfortable in the bed. There is moderate swelling to the left anterior knee. No erythema or warmth. We'll get labs and treat patient's pain and reassess.       Roshni Augustin MD  Attending Emergency  Physician              Rakel Johnson MD  04/18/19 9497

## 2019-04-19 NOTE — ED NOTES
Pt walked into ER in NAD. Pt reports a left knee injury about 1 year ago that has now exacerbated. Pt states she was here on Tuesday for the same issue where she was told she has fluid accumulating in her left knee causing the pain. Pt states her knee has since swelled up again. Pt states she is unable to put weight on her left knee now. Pt rates pain is a 10 right now. Pt states she is unable to care for herself right now due to this issue. Pt is resting in bed in NAD with even and unlabored rr.  Will continue to monitor       John Rios RN  04/18/19 2026

## 2019-04-19 NOTE — PROGRESS NOTES
Pt up to bathroom with standby assist, ambulating with some difficulty and increased pain in left leg.

## 2019-04-19 NOTE — ED PROVIDER NOTES
FACULTY SIGN-OUT  ADDENDUM       Patient: Tiara Schuler   MRN: [id number]  PCP:  Ebonie Delgado,   The patient's initial evaluation and plan have been discussed with the prior provider who initially evaluated the patient. Nursing Notes, Past Medical Hx, Past Surgical Hx, Social Hx, Allergies, and Family Hx were all reviewed. Pertinent Comments: The patient is a 44 y.o. female taken in signout with knee pain and effusion with previous arthrocentesis however no samples were sent a few days ago during that at previous facility.    Repeat arthrocentesis under orthopedic surgery guidance here was done that shows white blood cell count less than 2000 and Gram stain negative so unlikely septic joint but awaiting crystals and may need PT assessment as well  We are awaiting admission    ED COURSE      The patient was given the following medications:  Orders Placed This Encounter   Medications    HYDROcodone-acetaminophen (NORCO) 5-325 MG per tablet 1 tablet    diphenhydrAMINE (BENADRYL) tablet 25 mg    metoprolol tartrate (LOPRESSOR) tablet 50 mg    ondansetron (ZOFRAN) injection 4 mg    HYDROcodone-acetaminophen (NORCO) 5-325 MG per tablet 1 tablet    HYDROcodone-acetaminophen (NORCO) 5-325 MG per tablet 1 tablet       RECENT VITALS:   BP: 132/76  Pulse: 60  Resp: 16  Temp: 99.8 °F (37.7 °C) SpO2: 97 %    (Please note that portions of this note were completed with a voice recognition program.  Efforts were made to edit the dictations but occasionally words are mis-transcribed.)    MD Charli Daniels  Attending Emergency Medicine Physician        Rajan Arriaza MD  04/19/19 5531

## 2019-04-19 NOTE — CONSULTS
Young Quintanilla                   CC/Reason for consult: left knee pain and swelling    HPI:      The patient is a 44 y.o. female with history of prior left knee gout several years ago as well as significant left knee OA that presents to ER with severe left knee pain and swelling with inability to ambulate secondary to pain. Patient was evaluated in the ER 2 days ago on 4/16/19 with similar pain and symptoms at which time x-rays were taken and left knee aspiration performed due to significant effusion. Fluid aspirated at that time was not sent for analysis and patient was discharged home from the ER with instructions for outpatient follow-up. Patient presents today with recurrence of left knee swelling and pain. Patient denies any significant pain or swelling in other joints. Patient denies any numbness/tingling. Patient states unable to ambulate due to severe pain and significant pain with knee range of motion. Past Medical History:    Past Medical History:   Diagnosis Date    Asthma     Depression     ESBL (extended spectrum beta-lactamase) producing bacteria infection 8/8/20214    E. Coli urine    Fibromyalgia     Gastroparesis     Headache     Hyperlipidemia     Hypertension     Neuropathy     Osteoarthritis     Tennis elbow     right     Past Surgical History:    Past Surgical History:   Procedure Laterality Date    ADENOIDECTOMY      BREAST SURGERY      CARDIAC CATHETERIZATION  7-    COSMETIC SURGERY      nose    HYSTERECTOMY      KNEE SURGERY      SINUS SURGERY      TONSILLECTOMY       Medications Prior to Admission:   Prior to Admission medications    Medication Sig Start Date End Date Taking?  Authorizing Provider   Elastic Bandages & Supports (KNEE BRACE ADJUSTABLE HINGED) MISC 1 Device by Does not apply route as needed (comfort) 12/19/18   Concha Montilla MD   acetaminophen (TYLENOL) 325 MG tablet Take 2 tablets by mouth every 6 hours as needed for Pain 12/19/18   Ilia Self MD   cephALEXin (KEFLEX) 500 MG capsule Take 500 mg by mouth 4 times daily    Historical Provider, MD   benzocaine (LOLLICAINE) 20 % SWAB dental swab Take 1 each by mouth 3 times daily as needed for Pain 12/11/18   Anupama Palomino MD   AMITRIPTYLINE HCL PO Take by mouth    Historical Provider, MD   dicyclomine (BENTYL) 10 MG capsule Take 1 capsule by mouth every 6 hours as needed (cramps) 5/26/18   Nicole Knee, DO   metoclopramide (REGLAN) 10 MG tablet Take 1 tablet by mouth 3 times daily as needed (abd pain/spasm) 5/26/18   Nicole Knee, DO   HYDROcodone-acetaminophen [facility]) 5-325 MG per tablet Take 1 tablet by mouth every 8 hours as needed for Pain . 7/21/17   Brandon Haddad MD   [facility]. Devices (WRIST BRACE) MIS 1 Device by Does not apply route daily One brace to be worn at night and daily as needed for carpal tunnel syndrome. Brace should place the wrist in neutral. 2/20/17   Susannah Ambriz,    EPINEPHrine (EPIPEN) 0.3 MG/0.3ML SOAJ injection Inject 0.3 mg into the muscle as needed Use as directed for allergic reaction    Historical Provider, MD   divalproex (DEPAKOTE) 500 MG DR tablet Take 1 tablet by mouth daily 9/13/16   Christi Silverman MD   metoprolol tartrate (LOPRESSOR) 50 MG tablet 2 IN AM AND 1 AT NIGHT 4/26/16   Historical Provider, MD   divalproex (DEPAKOTE ER) 500 MG ER tablet Take 1 tablet by mouth nightly 5/20/16   Rahul Moder Blood, DO   Elastic Bandages & Supports (TENNIS ELBOW SUPPORT) MIS 1 Device by Does not apply route as needed 6/2/15   Fili Brunson MD   pravastatin (PRAVACHOL) 20 MG tablet Take 20 mg by mouth daily. Historical Provider, MD   albuterol (VENTOLIN HFA) 108 (90 BASE) MCG/ACT inhaler Inhale 2 puffs into the lungs every 6 hours as needed for Wheezing. 9/9/14   Kameron Hanson,    ondansetron (ZOFRAN) 4 MG tablet Take 1 tablet by mouth every 8 hours as needed for Nausea.  8/8/14   Amy Guaman, DO   indomethacin (INDOCIN) 50 Sister     Diabetes Brother     High Blood Pressure Brother     Heart Disease Maternal Grandmother     Cancer Maternal Grandmother         breast    Cancer Paternal Grandmother     Heart Disease Paternal Grandfather      REVIEW OF SYSTEMS:    Constitutional: Negative for fever and chills. HENT: Negative for congestion. Eyes: Negative for blurred vision and double vision. Respiratory: Negative for cough, shortness of breath and wheezing. Cardiovascular: Negative for chest pain and palpitations. Gastrointestinal: Negative for nausea. Negative for vomiting. Musculoskeletal: Positive for myalgias and left knee pain with swelling. Skin: Negative for itching and rash. Neurological: Negative for dizziness, sensory change and headaches. Psychiatric/Behavioral: Negative for depression and suicidal ideas. PHYSICAL EXAM:  BP (!) 141/89   Pulse 91   Temp 99.8 °F (37.7 °C) (Oral)   Resp 16   SpO2 100%   Gen: AAOx3, NAD, cooperative  Head: normocephalic, atraumatic  Neck: supple, full AROM without pain  Chest: Non labored breathing, b/l clavicles without TTP, crepitus, step off, or deformity  Heart: Regular rate, no dependent edema, distal pulses 2+  LLE: No erythema/abrasion/laceration. Significant knee effusion with +patellar ballotment. Mildly increased warmth. PROM from 5-45 degrees with. Stable in varus and valgus stressing. Compartments soft and compressible. EHL/FHL/TA/GSC gross motor intact. Sural, saphenous, superificial/deep peroneal, and plantar nerve distribution SILT. Dorsalis pedis/posterior tibial pulses 2+ with BCR. LABS:  Recent Labs     04/18/19 2037   WBC 8.2   HGB 12.7   HCT 41.8      SEDRATE 41*   CRP 45.5*     Radiology:   Imaging of left knee from 4/16/19 reviewed and remarkable for significant osteoarthrosis changes as well as chondrocalcinosis changes.     A/P: 44 y.o. female being seen for left knee effusion      -Observed and assisted ER resident with L knee aspiration at bedside.  -Send fluid for cell count with diff, crystal analysis, and cultures. Will follow-up. -WBAT/AAT. -NPO after MN. -Recommend pain meds and NSAIDs per Obs team.  -Ice and elevation for pain/swelling  -DVT ppx: EPC & chemical AC as indicated per primary team.  -Will discuss case with Dr. Shaan Balderas. -Plan for follow-up fluid analysis in morning with rounds and assess for infection vs gout vs OA exacerbation and make recommendations accordingly Likely gout vs pseudogout given history and chondrocalcinosis on prior left knee x-rays. Doubt septic arthritis. -Please page Ortho with any questions or concerns.     Allie Cruz DO  PGY-3 Orthopedic Surgery  10:18 PM 4/18/2019

## 2019-04-19 NOTE — ED NOTES
Report given to Becky Chaudhary RN updated on pt status and denied any questions.      Tylor Jean-Baptiste RN  04/19/19 7340

## 2019-04-19 NOTE — CARE COORDINATION
Case Management Initial Discharge Plan  Jael,             Met with:patient to discuss discharge plans. Information verified: address, contacts, phone number, , insurance Yes  PCP: Ebonie Delgado DO  Date of last visit: last October    Insurance Provider: [place] [employer]    Discharge Planning    Living Arrangements:  Alone   Support Systems:  Family Members, Friends/Neighbors    Home has 1 stories  0 stairs to climb to get into front door,     Patient able to perform ADL's:Independent    Current Services (outpatient & in home) none  DME equipment: none  DME provider: requesting an elevated toilet seat    Pharmacy: [facility], medications are picked up or delivered. Potential Assistance Purchasing Medications:  No  Does patient want to participate in local refill/ meds to beds program?  No    Potential Assistance Needed:  N/A    Patient agreeable to home care: No  [place] of choice provided:  n/a    Prior SNF/Rehab Placement and Facility:   Agreeable to SNF/Rehab: No  [place] of choice provided: n/a   Evaluation: no    Expected Discharge date:  19  Patient expects to be discharged to:  home  Follow Up Appointment: Best Day/ Time:      Transportation provider: self  Transportation arrangements needed for discharge: No, someone will transport home,     Readmission Risk              Risk of Unplanned Readmission:        8             Does patient have a readmission risk score greater than 14?: No   8%  If yes, follow-up appointment must be made within 7 days of discharge. Discharge Plan: return to home, no skilled needs identified. Order for wheeled walker and elevated toilet seat faxed to [address] DME:  [phone], with face sheet and MD face to face by Dr Amina Mcclellan dated 19.             Electronically signed by Isamar Boland RN on 19 at 2:43 PM

## 2019-04-19 NOTE — ED NOTES
Bed: 44  Expected date:   Expected time:   Means of arrival:   Comments:     Mag Mosley RN  04/18/19 2008

## 2019-04-19 NOTE — H&P
Hypertension, Neuropathy, Osteoarthritis, and Tennis elbow. I have reviewed the past medical history with the patient and it is pertinent to this complaint. SURGICAL HISTORY      has a past surgical history that includes Hysterectomy; Breast surgery; Tonsillectomy; Adenoidectomy; sinus surgery; Cosmetic surgery; knee surgery; and Cardiac catheterization (2015). I have reviewed and agree with Surgical History entered and it is  pertinent to this complaint. CURRENT MEDICATIONS       metoprolol tartrate (LOPRESSOR) tablet 50 mg BID   sodium chloride flush 0.9 % injection 10 mL 2 times per day   sodium chloride flush 0.9 % injection 10 mL PRN   ondansetron (ZOFRAN) injection 4 mg Q8H PRN   HYDROcodone-acetaminophen (NORCO) 5-325 MG per tablet 1 tablet Q4H PRN       All medication charted and reviewed. ALLERGIES     is allergic to latex; dye [iodides]; motrin [ibuprofen]; tramadol; and naproxen. FAMILY HISTORY     indicated that her mother is . She indicated that her father is alive. She indicated that the status of her sister is unknown. She indicated that the status of her brother is unknown. She indicated that the status of her maternal grandmother is unknown. She indicated that the status of her paternal grandmother is unknown. She indicated that the status of her paternal grandfather is unknown.     family history includes Cancer in her maternal grandmother, mother, and paternal grandmother; Diabetes in her brother and sister; Heart Disease in her father, maternal grandmother, and paternal grandfather; High Blood Pressure in her brother and sister. The patient denies any pertinent family history. I have reviewed and agree with the family history entered. I have reviewed the Family History and it is not significant to the case    SOCIAL HISTORY      reports that she has never smoked. She has never used smokeless tobacco. She reports that she drinks alcohol.  She reports that she has current or past drug history. Drug: Marijuana. I have reviewed and agree with all Social.  There are no concerns for substance abuse/use. PHYSICAL EXAM     INITIAL VITALS:  oral temperature is 98 °F (36.7 °C). Her blood pressure is 151/78 (abnormal) and her pulse is 65. Her respiration is 18 and oxygen saturation is 100%. CONSTITUTIONAL: AOx4, no apparent distress, appears stated age    HEAD: normocephalic, atraumatic   EYES: PERRLA, EOMI    ENT: moist mucous membranes, uvula midline   NECK: supple, symmetric   BACK: symmetric   LUNGS: clear to auscultation bilaterally   CARDIOVASCULAR: regular rate and rhythm, no murmurs, rubs or gallops   ABDOMEN: soft, non-tender, non-distended with normal active bowel sounds   NEUROLOGIC:  MAEx4, no focal sensory or motor deficits   MUSCULOSKELETAL: no clubbing, cyanosis or edema. Left knee swelling, no erythema or drainage   SKIN: no rash or wounds       DIFFERENTIAL DIAGNOSIS/MDM:   Fracture, dislocation, sprain, strain, contusion, muscle spasm, ligamentous injury, meniscus injury, osteomyelitis, cellulitis, abscess, bursitis, arthritis, DJD, DVT        DIAGNOSTIC RESULTS     EKG: All EKG's are interpreted by the Observation Physician who either signs or Co-signs this chart in the absence of a cardiologist.    none    RADIOLOGY:   I directly visualized the following  images and reviewed the radiologist interpretations:    No results found. LABS:  I have reviewed and interpreted all available lab results.   Labs Reviewed   BODY FLUID CULTURE - Abnormal; Notable for the following components:       Result Value    Direct Exam MANY NEUTROPHILS (*)     All other components within normal limits   CBC WITH AUTO DIFFERENTIAL - Abnormal; Notable for the following components:    RBC 5.69 (*)     MCV 73.5 (*)     MCH 22.3 (*)     All other components within normal limits   C-REACTIVE PROTEIN - Abnormal; Notable for the following components:    CRP 45.5 (*)     All other components within normal limits   SEDIMENTATION RATE - Abnormal; Notable for the following components:    Sed Rate 41 (*)     All other components within normal limits   BODY FLUID CELL COUNT WITH DIFFERENTIAL   BODY FLUID CRYSTAL       SCREENING TOOLS:    HEART Risk Score for Chest Pain Patients   History and Physical Exam Suspicion Level  (Nausea, Vomiting, Diaphoresis, Radiation, Exertion)   Slightly Suspicious (0 pts)   Moderately Suspicious (1 pt)   Highly Suspicious (2 pts)   EKG Interpretation   Normal (0 pts)   Non-Specific Repolarization Disturbance (1 pt)   Significant ST-Depression (2 pts)   Age of Patient (in years)   = 39 (0 pts)   46-64 (1 pt)   = 65 (2 pts)   Risk Factors   No Risk Factors (0 pts)   1-2 Risk Factors (1 pt)   = 3 Risk Factors (2 pts)   Risk Factors Include:   Hypercholesterolemia   Hypertension   Diabetes Mellitus   Cigarette smoking   Positive family history   Obesity   CAD   (SLE, CKDz, HIV, Cocaine abuse)   Troponin Levels   = Normal Limit (0 pts)   1-3 Times Normal Limit (1 pt)   > 3 Times Normal Limit (2 pts)  TOTAL: na    Percent Risk for Major Adverse Cardiac Event (MACE)  0-3 pts indicates low risk for MACE   2.5% (DISCHARGE)   4-7 pts indicates moderate risk for MACE  20.3% (OBS)  8-10 pts indicates high risk for MACE  72.7% (EARLY INVASIVE [place])    CDU IMPRESSION / Simone Jennings is a 44 y.o. female who presents with    1.) Acute on chronic left knee pain and swelling  -Unknown etiology at this time.  Possibly secondary to recurrence of knee effusion secondary to osteoarthritis  -Arthrocentesis last night demonstrated no cultures, many neutrophils, no organisms, no growth to date in the fluid culture  -Knee immobilizer placed  -Oral norco for pain  -PT and OT to eval  -DME for walker and elevated toilet seat    · Continue home medications  · Monitor vitals, labs, and imaging  · DISPO: pending consults and clinical improvement    CONSULTS:    IP CONSULT TO ORTHOPEDIC

## 2019-04-19 NOTE — ED PROVIDER NOTES
[facility]  eMERGENCY dEPARTMENT eNCOUnter      Pt Name: Tee Vu  MRN: [id number]  [place] 1970  Date of evaluation: 4/18/2019  Provider: [facility], [facility]    CHIEF COMPLAINT       Chief Complaint   Patient presents with    Knee Pain             HISTORY OF PRESENT ILLNESS  (Location/Symptom, Timing/Onset, Context/Setting, Quality, Duration, Modifying Factors, Severity.)   Tee Vu is a 44 y.o. female who presents to the emergencydepartment complaining of left knee pain and swelling. She states that this started a few weeks ago. She was evaluated here 2 days ago and they did tap her knee. The swelling has returned. She states she is unable to ambulate well. She denies any chest pain or shortness of breath. No abdominal pain. No fevers or chills. She states there is no redness noted to the knee. No other symptoms. REVIEW OF SYSTEMS    (2-9 systems for level 4, 10 ormore for level 5)     Review of Systems   Constitutional: Negative for chills, fatigue and fever. HENT: Negative for sore throat. Respiratory: Negative for cough and shortness of breath. Cardiovascular: Negative for chest pain, palpitations and leg swelling. Gastrointestinal: Negative for abdominal pain, diarrhea, nausea and vomiting. Genitourinary: Negative for flank pain. Musculoskeletal: Negative for back pain, neck pain and neck stiffness. Left knee pain and swelling. Skin: Negative for color change. Neurological: Negative for dizziness, facial asymmetry, speech difficulty, weakness, light-headedness, numbness and headaches. PAST MEDICAL HISTORY         Diagnosis Date    Asthma     Depression     ESBL (extended spectrum beta-lactamase) producing bacteria infection 8/8/20214    E.  Coli urine    Fibromyalgia     Gastroparesis     Headache     Hyperlipidemia     Hypertension     Neuropathy     Osteoarthritis     Tennis elbow     right       SURGICAL HISTORY 1 each, Refills: 0      pravastatin (PRAVACHOL) 20 MG tablet Take 20 mg by mouth daily. albuterol (VENTOLIN HFA) 108 (90 BASE) MCG/ACT inhaler Inhale 2 puffs into the lungs every 6 hours as needed for Wheezing. Qty: 1 Inhaler, Refills: 0      ondansetron (ZOFRAN) 4 MG tablet Take 1 tablet by mouth every 8 hours as needed for Nausea. Qty: 12 tablet, Refills: 0      indomethacin (INDOCIN) 50 MG capsule Take 1 capsule by mouth 3 times daily (with meals). Qty: 30 capsule, Refills: 0      citalopram (CELEXA) 20 MG tablet Take 40 mg by mouth daily. losartan (COZAAR) 50 MG tablet Take 100 mg by mouth daily. !! - Potential duplicate medications found. Please discuss with provider. ALLERGIES     Latex; Dye [iodides]; Motrin [ibuprofen]; Tramadol; and Naproxen  Reviewed   FAMILY HISTORY           Problem Relation Age of Onset    Cancer Mother         breast and bone    Heart Disease Father     Diabetes Sister     High Blood Pressure Sister     Diabetes Brother     High Blood Pressure Brother     Heart Disease Maternal Grandmother     Cancer Maternal Grandmother         breast    Cancer Paternal Grandmother     Heart Disease Paternal Grandfather      Family Status   Relation Name Status    Mother     [facility] Father  [de-identified]    Sister  (Not Specified)    Brother  (Not Specified)    MGM  (Not Specified)    PGM  (Not Specified)    PGF  (Not Specified)        SOCIAL HISTORY      reports that she has never smoked. She has never used smokeless tobacco. She reports that she drinks alcohol. She reports that she has current or past drug history. Drug: Marijuana.     PHYSICAL EXAM    (up to 7 for level 4, 8 or more for level 5)     Vitals:    19 0529 19 0559 19 0700 19 0802   BP: 138/87 121/61 132/76 (!) 151/78   Pulse: 62 62 60 65   Resp: 16 16 16 18   Temp:    98 °F (36.7 °C)   TempSrc:    Oral   SpO2: 96% 97% 97% 100%       Physical Exam   Constitutional: She is oriented to person, place, and time. She appears well-developed and well-nourished. No distress. HENT:   Head: Normocephalic and atraumatic. Eyes: Pupils are equal, round, and reactive to light. Conjunctivae and EOM are normal.   Neck: Normal range of motion. Neck supple. No meningeal signs. Cardiovascular: Normal rate, regular rhythm, normal heart sounds and intact distal pulses. Exam reveals no gallop and no friction rub. No murmur heard. Pulmonary/Chest: Effort normal and breath sounds normal. No stridor. No respiratory distress. She has no wheezes. She has no rales. Abdominal: Soft. Bowel sounds are normal. She exhibits no distension and no mass. There is no tenderness. There is no rebound and no guarding. No hernia. No peritoneal signs. Musculoskeletal:        Left knee: She exhibits decreased range of motion, swelling and effusion. She exhibits no ecchymosis, no deformity, no laceration, no erythema, normal alignment, no LCL laxity and normal patellar mobility. Tenderness found. Patient is neurovascularly intact. She does have pain to palpation diffusely to the left knee and she does have some swelling and joint effusion on the left knee. Cap refills less than 2 seconds in all extremities. Light sensation is intact. Proprioception is within normal limits. All compartments are soft and compressible. No septic joints noted. Motor function is 5 out of 5 bilaterally in all extremities aside from the left knee which is limited due to pain and swelling. Distal pulses and deep tendon reflexes are within normal limits. No calf pain. No palpable cords. Neurological: She is alert and oriented to person, place, and time. No cranial nerve deficit. Skin: Skin is warm and dry. Capillary refill takes less than 2 seconds. She is not diaphoretic. Psychiatric: She has a normal mood and affect.  Her behavior is normal. Judgment and thought content normal.   Nursing note and vitals did give her Norco for pain which she does tolerate. Patient will be turned over to Dr. Adonis Keith at this time. Please see his note for further dictation and disposition. This patient was seen by the attending [phone] they agreed with the assessment and plan. CONSULTS:  IP CONSULT TO ORTHOPEDIC SURGERY    PROCEDURES:  Procedures    FINAL IMPRESSION      1. Knee effusion, left    2.  Inability to ambulate due to knee          DISPOSITION/PLAN   DISPOSITION        PATIENT REFERRED TO:  Ebonie Delgado DO  [address] [address] [phone]            DISCHARGE MEDICATIONS:  Current Discharge Medication List          (Please note that portions of this note were completed with a voice recognition program.  Efforts were made to edit the dictations but occasionally words are mis-transcribed.)    [address] [facility], PA-C Marilynne Ahumada, PA-C  04/18/19 [facility]  04/19/19 5631

## 2019-04-19 NOTE — PROGRESS NOTES
CDU Daily Progress Note  Attending Physician       Pt Name: Garett Quiroz  MRN: [id number]  [place] 1970  Date of evaluation: 4/19/19    I performed a history and physical examination of the patient and discussed management with the resident. I reviewed the residents note and agree with the documented findings and plan of care. Any areas of disagreement are noted on the chart. I was personally present for the key portions of any procedures. I have documented in the chart those procedures where I was not present during the key portions. I have reviewed the emergency nurses triage note. I agree with the chief complaint, past medical history, past surgical history, allergies, medications, social and family history as documented unless otherwise noted below. Documentation of the HPI, Physical Exam and Medical Decision Making performed by medical students or scribes is based on my personal performance of the HPI, PE and MDM. For Physician Assistant/ Nurse Practitioner cases/documentation I have personally evaluated this patient and have completed at least one if not all key elements of the E/M (history, physical exam, and MDM). Additional findings are as noted. The Family History, Social History and Review of Systems are unchanged from the previous day. No significant events overnight. Not diabetic. Nonsmoker. Ortho to see while admitted. Drained fluid in ED. Still having difficulty weight bearing. Will order walker/brace for home.     Buck Sosa MD  Attending Physician  Critical Decision Unit

## 2019-04-19 NOTE — ED PROVIDER NOTES
[facility] [address] [facility]  Emergency Department  Faculty Sign-Out Addendum     Care of Crissy Cano was assumed from previous attending and is being seen for Knee Pain  . The patient's initial evaluation and plan have been discussed with the prior provider who initially evaluated the patient. EMERGENCY DEPARTMENT COURSE / MEDICAL DECISION MAKING:       MEDICATIONS GIVEN:  Orders Placed This Encounter   Medications    HYDROcodone-acetaminophen (NORCO) 5-325 MG per tablet 1 tablet    diphenhydrAMINE (BENADRYL) tablet 25 mg    metoprolol tartrate (LOPRESSOR) tablet 50 mg    ondansetron (ZOFRAN) injection 4 mg    HYDROcodone-acetaminophen (NORCO) 5-325 MG per tablet 1 tablet    HYDROcodone-acetaminophen (NORCO) 5-325 MG per tablet 1 tablet       LABS / RADIOLOGY:     Labs Reviewed   BODY FLUID CULTURE - Abnormal; Notable for the following components:       Result Value    Direct Exam MANY NEUTROPHILS (*)     All other components within normal limits   CBC WITH AUTO DIFFERENTIAL - Abnormal; Notable for the following components:    RBC 5.69 (*)     MCV 73.5 (*)     MCH 22.3 (*)     All other components within normal limits   C-REACTIVE PROTEIN - Abnormal; Notable for the following components:    CRP 45.5 (*)     All other components within normal limits   SEDIMENTATION RATE - Abnormal; Notable for the following components:    Sed Rate 41 (*)     All other components within normal limits   BODY FLUID CELL COUNT WITH DIFFERENTIAL   BODY FLUID CRYSTAL       Xr Knee Left (3 Views)    Result Date: 4/16/2019  EXAMINATION: 3 XRAY VIEWS OF THE LEFT KNEE 4/16/2019 2:36 pm COMPARISON: None. HISTORY: ORDERING SYSTEM PROVIDED HISTORY: pain and swelling TECHNOLOGIST PROVIDED HISTORY: pain and swelling FINDINGS: No acute fracture or dislocation. No erosion. Chondrocalcinosis mediolateral compartments. Small tricompartment osteophytes. Small joint effusion.   Enthesopathy at the insertion of the distal quadriceps tendon. Osteoarthrosis. Chondrocalcinosis which can be seen in CPPD arthropathy. RECENT VITALS:     Temp: 99.8 °F (37.7 °C),  Pulse: 71, Resp: 16, BP: 134/70, SpO2: 100 %    Lisa Randolph is a 44 y.o. female who presents with complaint of knee pain and swelling. Recent therapeutic tap. No signs of erythema or warmth, although was consulted and reactive. And sent fluids for testing. Admitted to ETU for evaluation by orthopedic surgery and pain control. Awaiting bed. OUTSTANDING TASKS / RECOMMENDATIONS:    1.  Disposition made by previous attending and nothing to do      Abiel Sims MD  Attending Emergency Physician  [facility] ED       Glenys James MD  04/19/19 6355

## 2019-04-19 NOTE — ED NOTES
Pt vitals obtained, pt reports pain 10/10 at this time, RR equal and unlabored, pt repositioned at this time, will continue to monitor pt.      Lisa Munoz RN  04/18/19 9832

## 2019-04-19 NOTE — ED NOTES
Fluid drained from pt left knee by ortho resident and sample collected labeled and sent for testing. Pt in NAD. Will continue to monitor.      Elio Coppola RN  04/18/19 6196

## 2019-04-19 NOTE — ED NOTES
Pt resting in bed at this time, pt vitals obtained, RR equal and unlabored, will continue to monitor pt.      Magaly Zuleta RN  04/19/19 8803

## 2019-04-19 NOTE — ED PROVIDER NOTES
[facility] [address] [facility]  Emergency Department  Emergency Medicine Resident Sign-out     Care of Venessa Leonard was assumed from Dr. Mary Curry and is being seen for Knee Pain  . The patient's initial evaluation and plan have been discussed with the prior provider who initially evaluated the patient. EMERGENCY DEPARTMENT COURSE / MEDICAL DECISION MAKING:       MEDICATIONS GIVEN:  Orders Placed This Encounter   Medications    HYDROcodone-acetaminophen (NORCO) 5-325 MG per tablet 1 tablet    diphenhydrAMINE (BENADRYL) tablet 25 mg    HYDROcodone-acetaminophen (NORCO) 5-325 MG per tablet 1 tablet       LABS / RADIOLOGY:     Labs Reviewed   CBC WITH AUTO DIFFERENTIAL - Abnormal; Notable for the following components:       Result Value    RBC 5.69 (*)     MCV 73.5 (*)     MCH 22.3 (*)     All other components within normal limits   C-REACTIVE PROTEIN - Abnormal; Notable for the following components:    CRP 45.5 (*)     All other components within normal limits   SEDIMENTATION RATE - Abnormal; Notable for the following components:    Sed Rate 41 (*)     All other components within normal limits   BODY FLUID CULTURE   BODY FLUID CELL COUNT WITH DIFFERENTIAL   BODY FLUID CRYSTAL       Xr Knee Left (3 Views)    Result Date: 4/16/2019  EXAMINATION: 3 XRAY VIEWS OF THE LEFT KNEE 4/16/2019 2:36 pm COMPARISON: None. HISTORY: ORDERING SYSTEM PROVIDED HISTORY: pain and swelling TECHNOLOGIST PROVIDED HISTORY: pain and swelling FINDINGS: No acute fracture or dislocation. No erosion. Chondrocalcinosis mediolateral compartments. Small tricompartment osteophytes. Small joint effusion. Enthesopathy at the insertion of the distal quadriceps tendon. Osteoarthrosis. Chondrocalcinosis which can be seen in CPPD arthropathy. RECENT VITALS:     Temp: 99.8 °F (37.7 °C),  Pulse: 91, Resp: 16, BP: (!) 141/89, SpO2: 100 %    This patient is a 44 y.o. Female with left knee pain and swelling.   Had knee tap 2 days ago but swelling is returned. Orthopedics consulted for tapping of joint for so fluid count and culture. Will be admitted to observation unit for orthopedic to follow. 10:49 PM  Arthrocentesis  Date/Time: 4/18/2019 10:49 PM  Performed by: Mohan Rust MD  Authorized by: Luis Antonio Bazan MD     Consent:     Consent obtained:  Verbal    Consent given by:  Patient    Risks discussed:  Infection, bleeding and pain  Location:     Location:  Knee    Knee:  L knee  Anesthesia (see MAR for exact dosages): Anesthesia method:  None  Procedure details:     Preparation: Patient was prepped and draped in usual sterile fashion      Needle gauge:  18 G    Ultrasound guidance: no      Approach:  Lateral    Aspirate amount:  50 cc    Aspirate characteristics:  Yellow    Steroid injected: no      Specimen collected: yes    Post-procedure details:     Dressing:  Adhesive bandage    Patient tolerance of procedure: Tolerated well, no immediate complications        OUTSTANDING TASKS / RECOMMENDATIONS:    1. Admitted to observation unit, await bed     FINAL IMPRESSION:     1. Knee effusion, left    2.  Inability to ambulate due to knee        DISPOSITION:         DISPOSITION:  []  Discharge   []  Transfer -    [x]  Admission -  ETU   []  Against Medical Advice   []  Eloped   FOLLOW-UP: Demi Graves, DO  [address]  [phone]           DISCHARGE MEDICATIONS: New Prescriptions    No medications on file          Mohan Rust MD  Emergency Medicine Resident  [facility]       Mohan Rust MD  Resident  04/19/19 7019

## 2019-04-19 NOTE — ED NOTES
Pt repositioned in bed at this time, pt given warm blankets, pt denies any complaints will continue to monitor pt.      Hal Young RN  04/19/19 9752

## 2019-04-19 NOTE — ED NOTES
Pt resting in bed at this time, pt denies any complaints will continue to monitor, pt RR equal and unlabored, will continue to monitor pt.      Graham Paget, RN  04/19/19 2420

## 2019-04-19 NOTE — ED NOTES
Pt assisted to bedside commode. Pt with difficulty bearing weight on left leg. Assisted x 1. Pt with increased pain. Will give pain medication.       Karan Chinchilla RN  04/19/19 0849

## 2019-04-19 NOTE — ED PROVIDER NOTES
[facility] ED  Emergency Department  Emergency Medicine Resident Sign-out     Care of Cindy Floyd was assumed from Dr. Connie Pizarro and is being seen for Knee Pain  . The patient's initial evaluation and plan have been discussed with the prior provider who initially evaluated the patient. EMERGENCY DEPARTMENT COURSE / MEDICAL DECISION MAKING:       MEDICATIONS GIVEN:  Orders Placed This Encounter   Medications    HYDROcodone-acetaminophen (NORCO) 5-325 MG per tablet 1 tablet    diphenhydrAMINE (BENADRYL) tablet 25 mg    metoprolol tartrate (LOPRESSOR) tablet 50 mg    ondansetron (ZOFRAN) injection 4 mg    HYDROcodone-acetaminophen (NORCO) 5-325 MG per tablet 1 tablet    HYDROcodone-acetaminophen (NORCO) 5-325 MG per tablet 1 tablet       LABS / RADIOLOGY:     Labs Reviewed   BODY FLUID CULTURE - Abnormal; Notable for the following components:       Result Value    Direct Exam MANY NEUTROPHILS (*)     All other components within normal limits   CBC WITH AUTO DIFFERENTIAL - Abnormal; Notable for the following components:    RBC 5.69 (*)     MCV 73.5 (*)     MCH 22.3 (*)     All other components within normal limits   C-REACTIVE PROTEIN - Abnormal; Notable for the following components:    CRP 45.5 (*)     All other components within normal limits   SEDIMENTATION RATE - Abnormal; Notable for the following components:    Sed Rate 41 (*)     All other components within normal limits   BODY FLUID CELL COUNT WITH DIFFERENTIAL   BODY FLUID CRYSTAL       Xr Knee Left (3 Views)    Result Date: 4/16/2019  EXAMINATION: 3 XRAY VIEWS OF THE LEFT KNEE 4/16/2019 2:36 pm COMPARISON: None. HISTORY: ORDERING SYSTEM PROVIDED HISTORY: pain and swelling TECHNOLOGIST PROVIDED HISTORY: pain and swelling FINDINGS: No acute fracture or dislocation. No erosion. Chondrocalcinosis mediolateral compartments. Small tricompartment osteophytes. Small joint effusion.   Enthesopathy at the insertion of the distal quadriceps tendon. Osteoarthrosis. Chondrocalcinosis which can be seen in CPPD arthropathy. RECENT VITALS:     Temp: 99.8 °F (37.7 °C),  Pulse: 62, Resp: 16, BP: 121/61, SpO2: 97 %    This patient is a 44 y.o. Female with L knee pain and effusion. Joint aspirate returned with normal WBC count. It appears recommended admission to observation unit for repeat evaluation in the morning. Patient is awaiting bed placement    OUTSTANDING TASKS / RECOMMENDATIONS:    1. Awaiting bed placement     FINAL IMPRESSION:     1. Knee effusion, left    2.  Inability to ambulate due to knee        DISPOSITION:         DISPOSITION:  []  Discharge   []  Transfer -    [x]  Admission - ETU    []  Against Medical Advice   []  Eloped   FOLLOW-UP: Himanshu Ron, DO  [address]  [phone]           DISCHARGE MEDICATIONS: New Prescriptions    No medications on file           Kalani Jose MD  Emergency Medicine Resident  [facility]     Kalani Jose MD  Resident  04/19/19 5525

## 2019-04-19 NOTE — ED NOTES
Pt resting in stretcher at this time, RR equal and unlabored, pt vitals taken, pt denies any complaints at this time, will continue to monitor pt.         [facility]  04/19/19 1001

## 2019-04-19 NOTE — ED NOTES
Pt resting in bed at this time RR equal and unlabored, will continue to monitor pt.      Lisa Munoz RN  04/19/19 2265

## 2019-04-20 VITALS
DIASTOLIC BLOOD PRESSURE: 87 MMHG | WEIGHT: 240 LBS | RESPIRATION RATE: 18 BRPM | BODY MASS INDEX: 38.57 KG/M2 | OXYGEN SATURATION: 98 % | TEMPERATURE: 97.2 F | HEART RATE: 63 BPM | HEIGHT: 66 IN | SYSTOLIC BLOOD PRESSURE: 146 MMHG

## 2019-04-20 PROCEDURE — 97530 THERAPEUTIC ACTIVITIES: CPT

## 2019-04-20 PROCEDURE — 2580000003 HC RX 258: Performed by: EMERGENCY MEDICINE

## 2019-04-20 PROCEDURE — 6370000000 HC RX 637 (ALT 250 FOR IP): Performed by: EMERGENCY MEDICINE

## 2019-04-20 PROCEDURE — 6370000000 HC RX 637 (ALT 250 FOR IP): Performed by: STUDENT IN AN ORGANIZED HEALTH CARE EDUCATION/TRAINING PROGRAM

## 2019-04-20 PROCEDURE — 97162 PT EVAL MOD COMPLEX 30 MIN: CPT

## 2019-04-20 PROCEDURE — G0378 HOSPITAL OBSERVATION PER HR: HCPCS

## 2019-04-20 RX ORDER — HYDROXYZINE HYDROCHLORIDE 10 MG/1
10 TABLET, FILM COATED ORAL 3 TIMES DAILY PRN
Qty: 20 TABLET | Refills: 0 | Status: SHIPPED | OUTPATIENT
Start: 2019-04-20 | End: 2019-04-30

## 2019-04-20 RX ORDER — HYDROCODONE BITARTRATE AND ACETAMINOPHEN 5; 325 MG/1; MG/1
1 TABLET ORAL EVERY 8 HOURS PRN
Qty: 8 TABLET | Refills: 0 | Status: SHIPPED | OUTPATIENT
Start: 2019-04-20 | End: 2019-04-24

## 2019-04-20 RX ORDER — FENTANYL CITRATE 50 UG/ML
50 INJECTION, SOLUTION INTRAMUSCULAR; INTRAVENOUS EVERY 4 HOURS PRN
Status: DISCONTINUED | OUTPATIENT
Start: 2019-04-20 | End: 2019-04-20 | Stop reason: HOSPADM

## 2019-04-20 RX ADMIN — HYDROCODONE BITARTRATE AND ACETAMINOPHEN 1 TABLET: 5; 325 TABLET ORAL at 01:02

## 2019-04-20 RX ADMIN — METOPROLOL TARTRATE 50 MG: 50 TABLET, FILM COATED ORAL at 09:07

## 2019-04-20 RX ADMIN — Medication 10 ML: at 09:12

## 2019-04-20 RX ADMIN — HYDROCODONE BITARTRATE AND ACETAMINOPHEN 1 TABLET: 5; 325 TABLET ORAL at 14:15

## 2019-04-20 RX ADMIN — HYDROXYZINE HYDROCHLORIDE 10 MG: 10 TABLET ORAL at 09:07

## 2019-04-20 RX ADMIN — HYDROCODONE BITARTRATE AND ACETAMINOPHEN 1 TABLET: 5; 325 TABLET ORAL at 05:31

## 2019-04-20 RX ADMIN — HYDROCODONE BITARTRATE AND ACETAMINOPHEN 1 TABLET: 5; 325 TABLET ORAL at 09:07

## 2019-04-20 ASSESSMENT — PAIN SCALES - GENERAL
PAINLEVEL_OUTOF10: 6
PAINLEVEL_OUTOF10: 8
PAINLEVEL_OUTOF10: 10
PAINLEVEL_OUTOF10: 8
PAINLEVEL_OUTOF10: 8
PAINLEVEL_OUTOF10: 10
PAINLEVEL_OUTOF10: 9

## 2019-04-20 ASSESSMENT — PAIN DESCRIPTION - LOCATION: LOCATION: KNEE

## 2019-04-20 ASSESSMENT — PAIN DESCRIPTION - PAIN TYPE: TYPE: ACUTE PAIN

## 2019-04-20 ASSESSMENT — PAIN DESCRIPTION - ORIENTATION: ORIENTATION: LEFT

## 2019-04-20 NOTE — DISCHARGE SUMMARY
CDU Discharge Summary        Patient:  Keeley Reynolds  YOB: 1970    MRN: [id number]   Acct: [de-identified]    Primary Care Physician: Asia Lentz DO    Admit date:  4/18/2019  8:08 PM  Discharge date: 4/20/19      Discharge Diagnoses:     1.) Acute on chronic left knee pain and swelling  -Unknown etiology at this time. Possibly secondary to recurrence of knee effusion secondary to osteoarthritis  -Arthrocentesis demonstrated no culture growth, many neutrophils, no organisms, no growth to date in the fluid culture, no crystals  -Knee immobilizer placed  -Oral norco for pain, IV fentanyl for severe pain, topical diclofenac gel  -PT eval today recommended home health which was ordered for patient including PT evaluation and treatment  -DME for walker and elevated toilet seat  -Pain relatively well controlled, safe for discharge home        Follow-up:  Call today/tomorrow for a follow up appointment with Asia Lentz DO , or return to the Emergency Room with worsening symptoms    Stressed to patient the importance of following up with primary care doctor for further workup/management of symptoms. Pt verbalizes understanding and agrees with plan. Discharge Medication Changes:       Medication List      START taking these medications    diclofenac sodium 1 % Gel  Apply 2 g topically 2 times daily     hydrOXYzine 10 MG tablet  Commonly known as:  ATARAX  Take 1 tablet by mouth 3 times daily as needed for Itching        CONTINUE taking these medications    acetaminophen 325 MG tablet  Commonly known as:  TYLENOL  Take 2 tablets by mouth every 6 hours as needed for Pain     albuterol sulfate  (90 Base) MCG/ACT inhaler  Commonly known as:  VENTOLIN HFA  Inhale 2 puffs into the lungs every 6 hours as needed for Wheezing.      AMITRIPTYLINE HCL PO     benzocaine 20 % Swab dental swab  Commonly known as:  LOLLICAINE  Take 1 each by mouth 3 times daily as needed for Pain     cephALEXin 500 MG capsule  Commonly known as:  KEFLEX     citalopram 20 MG tablet  Commonly known as:  CELEXA     dicyclomine 10 MG capsule  Commonly known as:  BENTYL  Take 1 capsule by mouth every 6 hours as needed (cramps)     * divalproex 500 MG extended release tablet  Commonly known as:  DEPAKOTE ER  Take 1 tablet by mouth nightly     * divalproex 500 MG DR tablet  Commonly known as:  DEPAKOTE  Take 1 tablet by mouth daily     EPINEPHrine 0.3 MG/0.3ML Soaj injection  Commonly known as:  EPIPEN     HYDROcodone-acetaminophen 5-325 MG per tablet  Commonly known as:  NORCO  Take 1 tablet by mouth every 8 hours as needed for Pain for up to 4 days. indomethacin 50 MG capsule  Commonly known as:  INDOCIN  Take 1 capsule by mouth 3 times daily (with meals). losartan 50 MG tablet  Commonly known as:  COZAAR     metoprolol tartrate 50 MG tablet  Commonly known as:  LOPRESSOR     ondansetron 4 MG tablet  Commonly known as:  ZOFRAN  Take 1 tablet by mouth every 8 hours as needed for Nausea. pravastatin 20 MG tablet  Commonly known as:  PRAVACHOL     * Tennis Elbow Support Misc  1 Device by Does not apply route as needed     * Knee Brace Adjustable Hinged Misc  1 Device by Does not apply route as needed (comfort)     Wrist Brace Misc  1 Device by Does not apply route daily One brace to be worn at night and daily as needed for carpal tunnel syndrome. Brace should place the wrist in neutral.         * This list has 4 medication(s) that are the same as other medications prescribed for you. Read the directions carefully, and ask your doctor or other care provider to review them with you.             STOP taking these medications    metoclopramide 10 MG tablet  Commonly known as:  REGLAN           Where to Get Your Medications      You can get these medications from any pharmacy    Bring a paper prescription for each of these medications  · diclofenac sodium 1 % Gel  · HYDROcodone-acetaminophen 5-325 MG per tablet  · hydrOXYzine 10 MG Fluid  0 %    Basos, Fluid  0 %    Other Cells, Fluid MONOCYTES %    Fluid Diff Comment     Body Fluid Crystal   Result Value Ref Range    Specimen Type . SYNOVIAL FLUID     Crystals, Fluid NEGATIVE NEGATIVE     No results found. Physical Exam:    General appearance - NAD, AOx 3   Lungs -CTAB, no R/R/R  Heart - RRR, no M/R/G  Abdomen - Soft, NT/ND  Neurological:  MAEx4, No focal motor deficit, sensory loss  Extremities - Cap refil <2 sec in all ext., +swelling without erythema or induration to left knee  Skin -warm, dry      Hospital Course:  Clinical course has improved, labs and imaging reviewed. Barbara Jamil originally presented to the hospital on 4/18/2019  8:08 PM with knee pain. At that time it was determined that She required further observation and testing and consultation. Labs and imaging were followed daily. Imaging results as above. She is medically stable to be discharged. Disposition: Home    Patient stated that they will not drive themselves home from the hospital if they have gotten pain killers/ narcotics earlier that day and that they will arrange for transportation on their own or work with the  for a ride. Patient counseled NOT to drive while under the influence of narcotics/ pain killers. Condition: Good    Patient stable and ready for discharge home. I have discussed plan of care with patient and they are in understanding. They were instructed to read discharge paperwork. All of their questions and concerns were addressed. Time Spent: 0 day      --  Adiel Nixon MD  Emergency Medicine Resident Physician    This dictation was generated by voice recognition computer software. Although all attempts are made to edit the dictation for accuracy, there may be errors in the transcription that are not intended.

## 2019-04-20 NOTE — PROGRESS NOTES
CDU Daily Progress Note  Attending Physician       Pt Name: Argenis Krueger  MRN: [id number]  [place] 1970  Date of evaluation: 4/20/19    I performed a history and physical examination of the patient and discussed management with the resident. I reviewed the residents note and agree with the documented findings and plan of care. Any areas of disagreement are noted on the chart. I was personally present for the key portions of any procedures. I have documented in the chart those procedures where I was not present during the key portions. I have reviewed the emergency nurses triage note. I agree with the chief complaint, past medical history, past surgical history, allergies, medications, social and family history as documented unless otherwise noted below. Documentation of the HPI, Physical Exam and Medical Decision Making performed by medical students or scribes is based on my personal performance of the HPI, PE and MDM. For Physician Assistant/ Nurse Practitioner cases/documentation I have personally evaluated this patient and have completed at least one if not all key elements of the E/M (history, physical exam, and MDM). Additional findings are as noted. The Family History, Social History and Review of Systems are unchanged from the previous day. No significant events overnight. Nonsmoker. Not diabetic. Ortho has signed off. Feel patient can go home and be seen as an outpatient. Patient has home care. Ok to discharge today.     Holger Ott MD  Attending Physician  Critical Decision Unit

## 2019-04-20 NOTE — PROGRESS NOTES
Physical Therapy    Facility/Department: [address] MED SURG  Initial Assessment    NAME: Cindy Floyd  : 1970  MRN: [id number]    Chief Complaint   Patient presents with    Knee Pain       Date of Service: 2019    Discharge Recommendations:    Further therapy recommended at discharge. PT Equipment Recommendations  Equipment Needed: Yes  Mobility Devices: Lidia Lace; ADL AssistiveDevices  Walker: Rolling  ADL Assistive Devices: Toileting - Raised Toilet Seat with arms    Assessment   Body structures, Functions, Activity limitations: Decreased functional mobility ; Decreased balance;Decreased endurance  Assessment: Pt grossly CGA for mobility, Tyler in/ out bed for LLE management. Pt amb 115' RW CGA, L leading gait with knee brace donned. Prognosis: Good  Decision Making: Medium Complexity  Patient Education: PT POC  REQUIRES PT FOLLOW UP: Yes  Activity Tolerance  Activity Tolerance: Patient Tolerated treatment well       Patient Diagnosis(es): The primary encounter diagnosis was Knee effusion, left. A diagnosis of Inability to ambulate due to knee was also pertinent to this visit. has a past medical history of Asthma, Depression, ESBL (extended spectrum beta-lactamase) producing bacteria infection, Fibromyalgia, Gastroparesis, Headache, Hyperlipidemia, Hypertension, Neuropathy, Osteoarthritis, and Tennis elbow. has a past surgical history that includes Hysterectomy; Breast surgery; Tonsillectomy; Adenoidectomy; sinus surgery; Cosmetic surgery; knee surgery; and Cardiac catheterization (2015). Restrictions  Restrictions/Precautions  Restrictions/Precautions: Contact Precautions, Fall Risk, Weight Bearing  Required Braces or Orthoses?: Yes(L velcrow knee immobalizer per orders.  Pt has used one intermittantly for a year)  Lower Extremity Weight Bearing Restrictions  Left Lower Extremity Weight Bearing: Weight Bearing As Tolerated  Vision/Hearing        Subjective  General  Chart Reviewed: Yes  Patient assessed for rehabilitation services?: Yes  Response To Previous Treatment: Not applicable  Family / Caregiver Present: No  Follows Commands: Within Functional Limits  Subjective  Subjective: RN and pt agreeable to PT. Pt alert in bed upon arrival.   Pain Screening  Patient Currently in Pain: Yes  Pain Assessment  Pain Assessment: 0-10  Pain Level: 10(Pt conversational throughout session)  Pain Type: Acute pain  Pain Location: Knee  Pain Orientation: Left  Non-Pharmaceutical Pain Intervention(s): Ambulation/Increased Activity;Repositioned; Emotional support  Vital Signs  Patient Currently in Pain: Yes  Pre Treatment Pain Screening  Intervention List: Patient able to continue with treatment    Orientation  Orientation  Overall Orientation Status: Within Functional Limits  Social/Functional History  Social/Functional History  Lives With: Alone  Type of Home: Apartment  Home Layout: One level  Home Access: Level entry  Bathroom Shower/Tub: Tub/Shower unit  Bathroom Toilet: Standard  Bathroom Equipment: (none)  Bathroom Accessibility: Accessible  Home Equipment: (step-stool used to get into bed, pt states bed is raised d/t pt being tall)  ADL Assistance: [address] [place] [address] Avenue: Independent  Homemaking Responsibilities: Yes  Ambulation Assistance: Independent  Transfer Assistance: Independent  Active : Yes  IADL Comments: Pt reports tubes in ears, cannot take showers without ear plugs  Additional Comments: Pt notes intermittant brace use L knee since injury 1 year ago when dog rean into side of pt's knee. per pt ligament injury, and intermittant flareups.    Cognition   Cognition  Overall Cognitive Status: WFL    Objective          AROM RLE (degrees)  RLE AROM: WFL  AROM LLE (degrees)  LLE AROM : WFL  LLE General AROM: limited tolerace to knee flexion  AROM RUE (degrees)  RUE AROM : WFL  AROM LUE (degrees)  LUE AROM : WFL  Strength RLE  Strength RLE: WFL  Strength LLE  Strength LLE: WFL  Strength RUE  Strength RUE: WFL  Strength LUE  Strength LUE: WFL     Sensation  Overall Sensation Status: WFL(Pt denies any numbness/ tingling)  Bed mobility  Supine to Sit: Minimal assistance(LLE)  Scooting: Stand by assistance  Transfers  Sit to Stand: Contact guard assistance  Stand to sit: Contact guard assistance  Ambulation  Ambulation?: Yes  Ambulation 1  Surface: level tile  Device: Rolling Walker  Assistance: Contact guard assistance  Quality of Gait: L leading gait, no buckling /signs of weakness. brace in place.  slow tre  Distance: 115' RW  Stairs/Curb  Stairs?: No     Balance  Posture: Good  Sitting - Static: Good  Sitting - Dynamic: Good  Standing - Static: Good;-  Standing - Dynamic: Fair;+  Comments: RW used while assessing standing balance  Exercises  Comments: toileted, SBA, CGA transferring     Plan   Plan  Times per week: 5-6x/wk  Current Treatment Recommendations: Strengthening, Balance Training, Functional Mobility Training, Transfer Training, Endurance Training, Stair training, Gait Training, Home Exercise Program, Safety Education & Training, Patient/Caregiver Education & Training, Equipment Evaluation, Education, & procurement  Safety Devices  Type of devices: Call light within reach, Nurse notified, Gait belt, Patient at risk for falls, Left in bed, All fall risk precautions in place  Restraints  Initially in place: No    AM-PAC Score  AM-PAC Inpatient Mobility Raw Score : 18  AM-PAC Inpatient T-Scale Score : 43.63  Mobility Inpatient CMS 0-100% Score: 46.58  Mobility Inpatient CMS G-Code Modifier : CK          Goals  Short term goals  Time Frame for Short term goals: 14 visits  Short term goal 1: Pt will be Tirso bed mobility  Short term goal 2: Pt will be Tirso transfers  Short term goal 3: Pt will be [place] amb 240' RW or least restrictive AD  Short term goal 4: Pt will be Tirso navigating 2 steps       Therapy Time   Individual Concurrent Group Co-treatment   Time In 5110 Time Out 0850         Minutes [place], [place]

## 2019-04-20 NOTE — PROGRESS NOTES
OBS/CDU   RESIDENT NOTE      Patients PCP is: Orlando Campos, DO        SUBJECTIVE      No acute events overnight. Has been able to tolerate a diet without nausea or vomiting. The patient is urinating on his own and is passing flatus. Denies fever, chills, nausea, vomiting, chest pain, shortness of breath, abdominal pain, focal weakness, numbness, tingling, urinary/bowel symptoms, vision changes, visual hallucinations, or headache. Continued left knee and lower leg pain. Culture showing NGDT, no crystals. PT and OT to see patient today      PHYSICAL EXAM      General: NAD, AO X 3  Heent: EMOI, PERRL  Neck: SUPPLE, NO JVD  Cardiovascular: RRR, S1S2  Pulmonary: CTAB, NO SOB  Abdomen: SOFT, NTTP, ND, +BS  Extremities: +2/4 PULSES DISTAL, pain and swelling to right knee  Neuro / Psych: NO NUMBNESS OR TINGLING, MENTATION AT BASELINE    PERTINENT TEST /EXAMS      I have reviewed all available laboratory results. MEDICATIONS CURRENT     metoprolol tartrate (LOPRESSOR) tablet 50 mg BID   sodium chloride flush 0.9 % injection 10 mL 2 times per day   sodium chloride flush 0.9 % injection 10 mL PRN   ondansetron (ZOFRAN) injection 4 mg Q8H PRN   HYDROcodone-acetaminophen (NORCO) 5-325 MG per tablet 1 tablet Q4H PRN   hydrOXYzine (ATARAX) tablet 10 mg TID PRN       All medication charted and reviewed. CONSULTS      IP CONSULT TO ORTHOPEDIC SURGERY    ASSESSMENT/PLAN       Karen Tinoco is a 44 y.o. female who presents with    1.) Acute on chronic left knee pain and swelling  -Unknown etiology at this time.  Possibly secondary to recurrence of knee effusion secondary to osteoarthritis  -Arthrocentesis demonstrated no culture growth, many neutrophils, no organisms, no growth to date in the fluid culture, no crystals  -Knee immobilizer placed  -Oral norco for pain, IV fentanyl for severe pain, topical diclofenac gel  -PT and OT to eval today  -DME for walker and elevated toilet seat  -Awaiting pt and ot recs        · Continue home medications  · Monitor vitals, labs, and imaging  · DISPO: pending consults and clinical improvement    --  Abdirahman Rothman  Emergency Medicine Resident Physician     This dictation was generated by voice recognition computer software. Although all attempts are made to edit the dictation for accuracy, there may be errors in the transcription that are not intended.

## 2019-04-23 DIAGNOSIS — M25.562 LEFT KNEE PAIN, UNSPECIFIED CHRONICITY: Primary | ICD-10-CM

## 2019-04-24 ENCOUNTER — OFFICE VISIT (OUTPATIENT)
Dept: ORTHOPEDIC SURGERY | Age: 49
End: 2019-04-24
Payer: MEDICARE

## 2019-04-24 VITALS — WEIGHT: 240 LBS | BODY MASS INDEX: 38.57 KG/M2 | HEIGHT: 66 IN

## 2019-04-24 DIAGNOSIS — M11.20 CHONDROCALCINOSIS: ICD-10-CM

## 2019-04-24 DIAGNOSIS — M17.12 LOCALIZED OSTEOARTHRITIS OF LEFT KNEE: Primary | ICD-10-CM

## 2019-04-24 DIAGNOSIS — M25.50 POLYARTHRALGIA: ICD-10-CM

## 2019-04-24 DIAGNOSIS — M25.462 EFFUSION OF LEFT KNEE: ICD-10-CM

## 2019-04-24 LAB
CULTURE: ABNORMAL
DIRECT EXAM: ABNORMAL
DIRECT EXAM: ABNORMAL
Lab: ABNORMAL
SPECIMEN DESCRIPTION: ABNORMAL

## 2019-04-24 PROCEDURE — 1036F TOBACCO NON-USER: CPT | Performed by: ORTHOPAEDIC SURGERY

## 2019-04-24 PROCEDURE — 3017F COLORECTAL CA SCREEN DOC REV: CPT | Performed by: ORTHOPAEDIC SURGERY

## 2019-04-24 PROCEDURE — G8417 CALC BMI ABV UP PARAM F/U: HCPCS | Performed by: ORTHOPAEDIC SURGERY

## 2019-04-24 PROCEDURE — 99214 OFFICE O/P EST MOD 30 MIN: CPT | Performed by: ORTHOPAEDIC SURGERY

## 2019-04-24 PROCEDURE — 20610 DRAIN/INJ JOINT/BURSA W/O US: CPT | Performed by: ORTHOPAEDIC SURGERY

## 2019-04-24 PROCEDURE — G8428 CUR MEDS NOT DOCUMENT: HCPCS | Performed by: ORTHOPAEDIC SURGERY

## 2019-04-24 RX ORDER — LIDOCAINE HYDROCHLORIDE 10 MG/ML
2 INJECTION, SOLUTION INFILTRATION; PERINEURAL ONCE
Status: COMPLETED | OUTPATIENT
Start: 2019-04-24 | End: 2019-04-25

## 2019-04-24 RX ORDER — METHYLPREDNISOLONE ACETATE 80 MG/ML
80 INJECTION, SUSPENSION INTRA-ARTICULAR; INTRALESIONAL; INTRAMUSCULAR; SOFT TISSUE ONCE
Status: COMPLETED | OUTPATIENT
Start: 2019-04-24 | End: 2019-04-25

## 2019-04-24 RX ORDER — BUPIVACAINE HYDROCHLORIDE 2.5 MG/ML
2 INJECTION, SOLUTION INFILTRATION; PERINEURAL ONCE
Status: COMPLETED | OUTPATIENT
Start: 2019-04-24 | End: 2019-04-25

## 2019-04-24 NOTE — PROGRESS NOTES
limited by pain. Stable ligamentously. Jonas's and patellar grind tests positive for pain. No patellar  abnormal tracking appreciated. Negative log roll. Negative SLR. EHL/FHL/TA/GS complex motor intact. Sural, saphenous, superificial/deep peroneal, and plantar nerve distribution SILT. Dorsalis pedis/posterior tibial pulses 2+ with BCR and no skin changes indicating acute/chronic vascular disease. Injection Procedure: Risks, benefits, and alternatives have been discussed regarding therapeutic and diagnostic aspiration and injection of the left knee joint(s). Patient elected to proceed with the proposed procedure. After sterile prep of the overlying skin we proceeded to insert a 21 gauge needle into the joint, aspirating appx 30cc of clear, yeallow and viscous fluid, followed by injecting 2cc 0.5% marcaine, 2cc of 1% lidocaine and 80mg of depomedrol. Patient tolerated the procedure well and expressed interval improvement in symptoms. Radiology:   Exam: Left knee   Indication: Left knee pain  Comparison: 4/16/2019  Findings: 3 views of the left knee demonstrate no acute osseous abnormality however there is mild degenerative changes of the medial and lateral compartment with mildly decreased joint space, subchondral sclerosis, and osteophyte formation. She does have significant chondrocalcinosis identified. Impression: Mild left knee bicompartmental knee arthritis with chondrocalcinosis. ASSESSMENT:     1. Localized osteoarthritis of left knee    2. Chondrocalcinosis    3. Effusion of left knee    4. Polyarthralgia         PLAN:     With an extensive discussion with patient regarding her radiographic and clinical findings. We do not have an identifiable cause for her frequent effusions other than the development of early osteoarthritis.   She does have a significant amount of chondral chondrocalcinosis which may be renal related however her chemistry panel gives no indication of any nephritis or renal failure. At this point given her fusion we will go ahead and an aspirate and inject steroid for her pain. We would also like to have her get a rheumatoid factor drawn from the lab to evaluate for rheumatoid arthritis screening as she did have a JUAN R in the past however this was equivocal.  This plan was discussed with the patient andthey are encouraged to call the office any questions or concerns. No follow-ups on file.     Orders Placed This Encounter   Medications    bupivacaine (MARCAINE) 0.25 % injection 5 mg    methylPREDNISolone acetate (DEPO-MEDROL) injection 80 mg    lidocaine 1 % injection 2 mL       Orders Placed This Encounter   Procedures    Rheumatoid Factor     Standing Status:   Future     Standing Expiration Date:   4/24/2020    43102 - FL DRAIN/INJECT LARGE JOINT/BURSA        Supriya Tavares DO  PGY-5, Department of [address], [place], [place]  3:46 PM 4/24/2019

## 2019-04-25 PROCEDURE — 96372 THER/PROPH/DIAG INJ SC/IM: CPT | Performed by: STUDENT IN AN ORGANIZED HEALTH CARE EDUCATION/TRAINING PROGRAM

## 2019-04-25 RX ADMIN — LIDOCAINE HYDROCHLORIDE 2 ML: 10 INJECTION, SOLUTION INFILTRATION; PERINEURAL at 11:08

## 2019-04-25 RX ADMIN — METHYLPREDNISOLONE ACETATE 80 MG: 80 INJECTION, SUSPENSION INTRA-ARTICULAR; INTRALESIONAL; INTRAMUSCULAR; SOFT TISSUE at 11:08

## 2019-04-25 RX ADMIN — BUPIVACAINE HYDROCHLORIDE 5 MG: 2.5 INJECTION, SOLUTION INFILTRATION; PERINEURAL at 11:08

## 2019-05-22 ENCOUNTER — OFFICE VISIT (OUTPATIENT)
Dept: ORTHOPEDIC SURGERY | Age: 49
End: 2019-05-22
Payer: MEDICARE

## 2019-05-22 ENCOUNTER — HOSPITAL ENCOUNTER (OUTPATIENT)
Age: 49
Discharge: HOME | End: 2019-05-22
Payer: MEDICARE

## 2019-05-22 VITALS — WEIGHT: 250 LBS | HEIGHT: 66 IN | BODY MASS INDEX: 40.18 KG/M2

## 2019-05-22 DIAGNOSIS — M11.20 CHONDROCALCINOSIS: ICD-10-CM

## 2019-05-22 DIAGNOSIS — M25.50 POLYARTHRALGIA: ICD-10-CM

## 2019-05-22 DIAGNOSIS — M17.12 LOCALIZED OSTEOARTHRITIS OF LEFT KNEE: Primary | ICD-10-CM

## 2019-05-22 DIAGNOSIS — M25.462 EFFUSION OF LEFT KNEE: ICD-10-CM

## 2019-05-22 DIAGNOSIS — M17.12 LOCALIZED OSTEOARTHRITIS OF LEFT KNEE: ICD-10-CM

## 2019-05-22 LAB — RHEUMATOID FACTOR: 10.6 IU/ML

## 2019-05-22 PROCEDURE — 99213 OFFICE O/P EST LOW 20 MIN: CPT | Performed by: STUDENT IN AN ORGANIZED HEALTH CARE EDUCATION/TRAINING PROGRAM

## 2019-05-22 PROCEDURE — 1036F TOBACCO NON-USER: CPT | Performed by: STUDENT IN AN ORGANIZED HEALTH CARE EDUCATION/TRAINING PROGRAM

## 2019-05-22 PROCEDURE — 36415 COLL VENOUS BLD VENIPUNCTURE: CPT

## 2019-05-22 PROCEDURE — 20610 DRAIN/INJ JOINT/BURSA W/O US: CPT | Performed by: STUDENT IN AN ORGANIZED HEALTH CARE EDUCATION/TRAINING PROGRAM

## 2019-05-22 PROCEDURE — G8417 CALC BMI ABV UP PARAM F/U: HCPCS | Performed by: STUDENT IN AN ORGANIZED HEALTH CARE EDUCATION/TRAINING PROGRAM

## 2019-05-22 PROCEDURE — 86431 RHEUMATOID FACTOR QUANT: CPT

## 2019-05-22 PROCEDURE — G8427 DOCREV CUR MEDS BY ELIG CLIN: HCPCS | Performed by: STUDENT IN AN ORGANIZED HEALTH CARE EDUCATION/TRAINING PROGRAM

## 2019-05-22 PROCEDURE — 3017F COLORECTAL CA SCREEN DOC REV: CPT | Performed by: STUDENT IN AN ORGANIZED HEALTH CARE EDUCATION/TRAINING PROGRAM

## 2019-05-24 NOTE — PROGRESS NOTES
[address] [place] [address] [place] [id number]  Dept: [phone]    AmbulatoryOrthopedic Return Patient Visit    HISTORY OF PRESENT ILLNESS:      The patient is a 44 y.o. Tess Hair is being seen for follow up today regarding left knee pain. She was seen four weeks ago with a knee effusion and chronic left knee pain likely related to moderate OA, possibly inflammatory with chondrocalcinosis. An aspiration and steroid injection was performed. She was also sent for RF labs. Unfortunately she did not make it to get her labs but states she will get today. She reports improvement in ROM after last visit but denies improvement in pain. She requests a new brace today as her current one is unsupportive and uncomfortable. She reports some weight gain over the past few months despite weight loss and exercise discussion at her last visit. Continues to deny and mechanical symptoms such as locking or catching. PHYSICAL EXAM:  Ht 5' 6\" (1.676 m)   Wt 250 lb (113.4 kg)   BMI 40.35 kg/m²    Physical Exam  Gen: alert and oriented  Psych:  Appropriate affect; Appropriate knowledge base; Appropriate mood; No hallucinations; Head: normocephalic atraumatic   Chest: symmetric chest excursion  Pelvis: stable  Ortho Exam  Extremity:  Knee:  No joint effusion or erythema. Skin intact without abrasions. Normal alignment with no gross leg length discrepancy. Plantigrade feet. Able to bear weightwith antalgic gait. No varus/valgus thrust. Thigh circumference and size grossly equal to contralateral. Medial joint line severely TTP. Compartments soft. ROM from 0-130 degrees. Stable ligamentously. Jonas's and patellar grind tests negative for pain. No patellar ballottement or abnormal tracking appreciated. Negative log roll. Negative SLR. EHL/FHL/TA/GS complex motor intact. Sural, saphenous, superificial/deep peroneal, and plantar nerve distribution SILT.  Dorsalis pedis/posterior tibial pulses 2+ with BCR and no skin changes indicating acute/chronic vascular disease. ASSESSMENT:     1. Localized osteoarthritis of left knee         PLAN:     Will trial course of physical therapy and provide new brace. We will also evaluate her response to viscosupplimentation injection today as her degenerative changes appear amendable. We also encouraged her further to continue focusing on diet and exercise and offered her a referral to nutritionist if desired however will try on her own for now. She states she will obtain the RF today. F/u 6 wks for re-evaluation and lab review. This plan was discussed with the patient andthey are encouraged to call the office any questions or concerns. Return in about 6 weeks (around 7/3/2019). A  was ordered and placed on the patient for pain control and stabilization due to left knee arthritis. Patient is ambulatory with weakness and instability therefore a left hinged knee brace will help with the weakness, stabilization and pain control. The brace will improve ADL's. Injection Procedure: Risks, benefits, and alternatives have been discussed regarding injection of the left knee joint(s). Patient elected to proceed with the proposed procedure. After sterile prep of the overlying skin we proceeded to insert a 21 gauge needle into the joint, injecting 48mg of Hylan injection. Patient tolerated the procedure well and expressed interval improvement in symptoms.        Orders Placed This Encounter   Medications    Hylan injection 48 mg       Orders Placed This Encounter   Procedures   [facility] Physical [facility]     Referral Priority:   Routine     Referral Type:   Eval and Treat     Referral Reason:   Specialty Services Required     Requested Specialty:   Physical Therapy     Number of Visits Requested:   1    RI ARTHROCENTESIS ASPIR&/INJ MAJOR JT/BURSA W/O US José Miguel Alexis DO  PGY-5, Department of [address] [facility], [place], [place]  7:41 AM 5/24/2019

## 2019-08-30 ENCOUNTER — HOSPITAL ENCOUNTER (EMERGENCY)
Age: 49
Discharge: HOME | End: 2019-08-30
Attending: EMERGENCY MEDICINE
Payer: MEDICARE

## 2019-08-30 VITALS
OXYGEN SATURATION: 99 % | DIASTOLIC BLOOD PRESSURE: 87 MMHG | WEIGHT: 246 LBS | TEMPERATURE: 98.5 F | SYSTOLIC BLOOD PRESSURE: 158 MMHG | BODY MASS INDEX: 37.28 KG/M2 | RESPIRATION RATE: 18 BRPM | HEIGHT: 68 IN | HEART RATE: 79 BPM

## 2019-08-30 DIAGNOSIS — H66.001 NON-RECURRENT ACUTE SUPPURATIVE OTITIS MEDIA OF RIGHT EAR WITHOUT SPONTANEOUS RUPTURE OF TYMPANIC MEMBRANE: Primary | ICD-10-CM

## 2019-08-30 DIAGNOSIS — H60.391 INFECTIVE OTITIS EXTERNA OF RIGHT EAR: ICD-10-CM

## 2019-08-30 PROCEDURE — 99282 EMERGENCY DEPT VISIT SF MDM: CPT

## 2019-08-30 PROCEDURE — 6370000000 HC RX 637 (ALT 250 FOR IP): Performed by: STUDENT IN AN ORGANIZED HEALTH CARE EDUCATION/TRAINING PROGRAM

## 2019-08-30 RX ORDER — DOXYCYCLINE HYCLATE 100 MG
100 TABLET ORAL 2 TIMES DAILY
Qty: 20 TABLET | Refills: 0 | Status: SHIPPED | OUTPATIENT
Start: 2019-08-30 | End: 2019-09-18

## 2019-08-30 RX ORDER — ACETAMINOPHEN 325 MG/1
650 TABLET ORAL EVERY 8 HOURS PRN
Qty: 60 TABLET | Refills: 0 | Status: ON HOLD | OUTPATIENT
Start: 2019-08-30 | End: 2019-10-15 | Stop reason: SDUPTHER

## 2019-08-30 RX ORDER — DOXYCYCLINE HYCLATE 100 MG
100 TABLET ORAL ONCE
Status: COMPLETED | OUTPATIENT
Start: 2019-08-30 | End: 2019-08-30

## 2019-08-30 RX ORDER — ACETAMINOPHEN 500 MG
1000 TABLET ORAL ONCE
Status: COMPLETED | OUTPATIENT
Start: 2019-08-30 | End: 2019-08-30

## 2019-08-30 RX ORDER — OFLOXACIN 3 MG/ML
5 SOLUTION AURICULAR (OTIC) 2 TIMES DAILY
Qty: 5 ML | Refills: 0 | Status: SHIPPED | OUTPATIENT
Start: 2019-08-30 | End: 2019-09-09

## 2019-08-30 RX ADMIN — DOXYCYCLINE HYCLATE 100 MG: 100 TABLET, COATED ORAL at 01:50

## 2019-08-30 RX ADMIN — ACETAMINOPHEN 1000 MG: 500 TABLET ORAL at 01:50

## 2019-08-30 ASSESSMENT — PAIN DESCRIPTION - LOCATION: LOCATION: EAR

## 2019-08-30 ASSESSMENT — PAIN DESCRIPTION - DESCRIPTORS: DESCRIPTORS: THROBBING;SHOOTING

## 2019-08-30 ASSESSMENT — PAIN SCALES - GENERAL
PAINLEVEL_OUTOF10: 8
PAINLEVEL_OUTOF10: 8

## 2019-08-30 ASSESSMENT — PAIN DESCRIPTION - PAIN TYPE: TYPE: ACUTE PAIN

## 2019-08-30 ASSESSMENT — PAIN DESCRIPTION - ORIENTATION: ORIENTATION: RIGHT

## 2019-08-30 ASSESSMENT — PAIN DESCRIPTION - FREQUENCY: FREQUENCY: CONTINUOUS

## 2019-08-30 NOTE — ED PROVIDER NOTES
[facility] ED  Emergency Department EncounterMedicine Resident     Pt Name: Darling Horowitz  MRN: [id number]  [place] 1970  Date ofevaluation: 8/30/19  PCP:  Nadine Christensen DO    CHIEF COMPLAINT       Chief Complaint   Patient presents with    Otalgia     HISTORY OF PRESENT ILLNESS  (Location/Symptom, Timing/Onset, Context/Setting, Quality, Duration, Modifying Factors, Severity.)      Darling Horowitz is a 44 y.o. female who presents w/ c/o bilateral ear pain, R>L. Patient has tympanostomy tubes in both ears; these were placed in March. She also reports mild drainage from the R ear. States the pain on the right is extending down her neck and up into her temples. She has tried taking Tylenol w/ no improvement. She denies fever, chills, congestion, sore throat, headache, cough, SOB, nausea, vomiting. REVIEW OF SYSTEMS    (2-9 systems for level 4, 10 or more for level 5)      Review of Systems   Constitutional: Negative for chills and fever. HENT: Positive for ear discharge and ear pain. Negative for congestion, dental problem, rhinorrhea, sore throat and trouble swallowing. Respiratory: Negative for cough and shortness of breath. Cardiovascular: Negative for chest pain. Gastrointestinal: Negative for abdominal pain, nausea and vomiting. Musculoskeletal: Negative for gait problem. Neurological: Negative for dizziness and headaches. Psychiatric/Behavioral: Negative for confusion. PAST MEDICAL / SURGICAL /SOCIAL / FAMILY HISTORY      has a past medical history of Asthma, Depression, ESBL (extended spectrum beta-lactamase) producing bacteria infection, Fibromyalgia, Gastroparesis, Headache, Hyperlipidemia, Hypertension, Neuropathy, Osteoarthritis, and Tennis elbow. has a past surgical history that includes Hysterectomy; Breast surgery; Tonsillectomy; Adenoidectomy; sinus surgery; Cosmetic surgery; knee surgery; and Cardiac catheterization (7-).       Social History Inject 0.3 mg into the muscle as needed Use as directed for allergic reaction    Historical Provider, MD   divalproex (DEPAKOTE) 500 MG DR tablet Take 1 tablet by mouth daily 9/13/16   Guadalupe Rodriguez MD   metoprolol tartrate (LOPRESSOR) 50 MG tablet 2 IN AM AND 1 AT NIGHT 4/26/16   Historical Provider, MD   divalproex (DEPAKOTE ER) 500 MG ER tablet Take 1 tablet by mouth nightly 5/20/16   Beka Juno Blood, DO   Elastic Bandages & Supports (TENNIS ELBOW SUPPORT) MISC 1 Device by Does not apply route as needed 6/2/15   Dioni Neff MD   pravastatin (PRAVACHOL) 20 MG tablet Take 20 mg by mouth daily. Historical Provider, MD   albuterol (VENTOLIN HFA) 108 (90 BASE) MCG/ACT inhaler Inhale 2 puffs into the lungs every 6 hours as needed for Wheezing. 9/9/14   Kameron Hanson DO   ondansetron (ZOFRAN) 4 MG tablet Take 1 tablet by mouth every 8 hours as needed for Nausea. 8/8/14   Orvis Osgood A Mathios,    indomethacin (INDOCIN) 50 MG capsule Take 1 capsule by mouth 3 times daily (with meals). 7/26/14   Reta Moon MD   citalopram (CELEXA) 20 MG tablet Take 40 mg by mouth daily. Historical Provider, MD   losartan (COZAAR) 50 MG tablet Take 100 mg by mouth daily. Historical Provider, MD       PHYSICAL EXAM   (up to 7 for level 4, 8 or more for level 5)      INITIAL VITALS:    height is 5' 8\" (1.727 m) and weight is 246 lb (111.6 kg). Her oral temperature is 98.5 °F (36.9 °C). Her blood pressure is 158/87 (abnormal) and her pulse is 79. Her respiration is 18 and oxygen saturation is 99%. Physical Exam   Constitutional: She is oriented to person, place, and time. She appears well-developed and well-nourished. No distress. HENT:   Head: Normocephalic and atraumatic.    Mouth/Throat: Oropharynx is clear and moist.   L TM w/ tympanostomy tube in place w/ yellowish extrusion through the tube, no canal erythema or edema, right ear canal w/ edema and erythema, otorrhea, tympanostomy tube in place w/ erythema

## 2019-09-01 ENCOUNTER — HOSPITAL ENCOUNTER (EMERGENCY)
Age: 49
Discharge: HOME | End: 2019-09-01
Attending: EMERGENCY MEDICINE

## 2019-09-01 VITALS
HEART RATE: 73 BPM | OXYGEN SATURATION: 100 % | DIASTOLIC BLOOD PRESSURE: 98 MMHG | HEIGHT: 68 IN | TEMPERATURE: 98.1 F | SYSTOLIC BLOOD PRESSURE: 164 MMHG | BODY MASS INDEX: 37.59 KG/M2 | WEIGHT: 248 LBS

## 2019-09-01 DIAGNOSIS — H60.391 INFECTIVE OTITIS EXTERNA OF RIGHT EAR: Primary | ICD-10-CM

## 2019-09-01 PROCEDURE — 6370000000 HC RX 637 (ALT 250 FOR IP): Performed by: STUDENT IN AN ORGANIZED HEALTH CARE EDUCATION/TRAINING PROGRAM

## 2019-09-01 PROCEDURE — 99283 EMERGENCY DEPT VISIT LOW MDM: CPT

## 2019-09-01 RX ORDER — DIPHENHYDRAMINE HCL 25 MG
25 TABLET ORAL ONCE
Status: COMPLETED | OUTPATIENT
Start: 2019-09-01 | End: 2019-09-01

## 2019-09-01 RX ORDER — OFLOXACIN 3 MG/ML
1 SOLUTION/ DROPS OPHTHALMIC 4 TIMES DAILY
Status: DISCONTINUED | OUTPATIENT
Start: 2019-09-01 | End: 2019-09-01 | Stop reason: HOSPADM

## 2019-09-01 RX ORDER — ACETAMINOPHEN 500 MG
1000 TABLET ORAL ONCE
Status: COMPLETED | OUTPATIENT
Start: 2019-09-01 | End: 2019-09-01

## 2019-09-01 RX ORDER — OXYCODONE HYDROCHLORIDE 5 MG/1
5 TABLET ORAL EVERY 8 HOURS PRN
Qty: 6 TABLET | Refills: 0 | Status: SHIPPED | OUTPATIENT
Start: 2019-09-01 | End: 2019-09-03

## 2019-09-01 RX ADMIN — DIPHENHYDRAMINE HCL 25 MG: 25 TABLET ORAL at 15:43

## 2019-09-01 RX ADMIN — OFLOXACIN 1 DROP: 3 SOLUTION OPHTHALMIC at 15:57

## 2019-09-01 RX ADMIN — ACETAMINOPHEN 1000 MG: 500 TABLET ORAL at 15:43

## 2019-09-01 ASSESSMENT — PAIN DESCRIPTION - FREQUENCY
FREQUENCY: CONTINUOUS
FREQUENCY: CONTINUOUS

## 2019-09-01 ASSESSMENT — PAIN DESCRIPTION - PROGRESSION
CLINICAL_PROGRESSION: GRADUALLY WORSENING
CLINICAL_PROGRESSION: GRADUALLY WORSENING

## 2019-09-01 ASSESSMENT — PAIN SCALES - GENERAL
PAINLEVEL_OUTOF10: 9

## 2019-09-01 ASSESSMENT — PAIN DESCRIPTION - LOCATION
LOCATION: EAR
LOCATION: EAR

## 2019-09-01 ASSESSMENT — PAIN DESCRIPTION - DESCRIPTORS: DESCRIPTORS: BURNING;ACHING

## 2019-09-01 ASSESSMENT — ENCOUNTER SYMPTOMS
NAUSEA: 0
FACIAL SWELLING: 1
NAUSEA: 0
SORE THROAT: 0
DIARRHEA: 0
SORE THROAT: 0
SHORTNESS OF BREATH: 0
ABDOMINAL PAIN: 0
ABDOMINAL PAIN: 0
TROUBLE SWALLOWING: 0
TROUBLE SWALLOWING: 0
COLOR CHANGE: 0
VOMITING: 0
COUGH: 0
VOMITING: 0
RHINORRHEA: 0
SHORTNESS OF BREATH: 0

## 2019-09-01 ASSESSMENT — PAIN DESCRIPTION - PAIN TYPE
TYPE: ACUTE PAIN
TYPE: ACUTE PAIN

## 2019-09-01 ASSESSMENT — PAIN DESCRIPTION - ONSET
ONSET: GRADUAL
ONSET: GRADUAL

## 2019-09-01 ASSESSMENT — PAIN DESCRIPTION - ORIENTATION
ORIENTATION: LEFT;RIGHT
ORIENTATION: RIGHT

## 2019-09-01 NOTE — ED PROVIDER NOTES
Pulse: 73,    Physical Exam   Constitutional: She is oriented to person, place, and time. She appears well-developed and well-nourished. HENT:   Head: Normocephalic and atraumatic. Right Ear: External ear normal.   Left Ear: External ear normal.   Eyes: Right eye exhibits no discharge. Left eye exhibits no discharge. No scleral icterus. Neck: Normal range of motion. No tracheal deviation present. No mastoid tenderness significant ear swelling on the right slight ear swelling on the left consistent with otitis externa   Pulmonary/Chest: Effort normal. No stridor. No respiratory distress. Musculoskeletal: Normal range of motion. Neurological: She is alert and oriented to person, place, and time. Coordination normal.   Skin: Skin is warm and dry. She is not diaphoretic. Psychiatric: She has a normal mood and affect. Her behavior is normal.       Comments    She is an otitis externa has failed outpatient treatment due to not being able to get the topical medications in to her ear will place a ear wick and start dose I have discussed ciprofloxacin for failed outpatient treatment we discussed the risks and benefits at this time patient would prefer to wait see how the topical medications work and potentially take this if those medications fail    Weiner DO, RDMS.   Attending Emergency Physician          Naeem Aquino DO  09/01/19 1098

## 2019-09-18 ENCOUNTER — HOSPITAL ENCOUNTER (EMERGENCY)
Age: 49
Discharge: HOME | End: 2019-09-18
Attending: EMERGENCY MEDICINE

## 2019-09-18 VITALS
DIASTOLIC BLOOD PRESSURE: 74 MMHG | HEART RATE: 89 BPM | RESPIRATION RATE: 18 BRPM | SYSTOLIC BLOOD PRESSURE: 150 MMHG | TEMPERATURE: 100.2 F

## 2019-09-18 DIAGNOSIS — J02.0 STREP PHARYNGITIS: Primary | ICD-10-CM

## 2019-09-18 LAB
DIRECT EXAM: ABNORMAL
Lab: ABNORMAL
SPECIMEN DESCRIPTION: ABNORMAL

## 2019-09-18 PROCEDURE — 99283 EMERGENCY DEPT VISIT LOW MDM: CPT

## 2019-09-18 PROCEDURE — 87880 STREP A ASSAY W/OPTIC: CPT

## 2019-09-18 PROCEDURE — 6370000000 HC RX 637 (ALT 250 FOR IP): Performed by: PHYSICIAN ASSISTANT

## 2019-09-18 PROCEDURE — 6360000002 HC RX W HCPCS: Performed by: PHYSICIAN ASSISTANT

## 2019-09-18 PROCEDURE — 96372 THER/PROPH/DIAG INJ SC/IM: CPT

## 2019-09-18 RX ORDER — DEXAMETHASONE SODIUM PHOSPHATE 10 MG/ML
10 INJECTION INTRAMUSCULAR; INTRAVENOUS ONCE
Status: COMPLETED | OUTPATIENT
Start: 2019-09-18 | End: 2019-09-18

## 2019-09-18 RX ORDER — AZITHROMYCIN 250 MG/1
TABLET, FILM COATED ORAL
Qty: 1 PACKET | Refills: 0 | Status: SHIPPED | OUTPATIENT
Start: 2019-09-18 | End: 2019-09-28

## 2019-09-18 RX ORDER — KETOROLAC TROMETHAMINE 15 MG/ML
15 INJECTION, SOLUTION INTRAMUSCULAR; INTRAVENOUS ONCE
Status: COMPLETED | OUTPATIENT
Start: 2019-09-18 | End: 2019-09-18

## 2019-09-18 RX ADMIN — KETOROLAC TROMETHAMINE 15 MG: 15 INJECTION, SOLUTION INTRAMUSCULAR; INTRAVENOUS at 11:26

## 2019-09-18 RX ADMIN — DEXAMETHASONE SODIUM PHOSPHATE 10 MG: 10 INJECTION INTRAMUSCULAR; INTRAVENOUS at 11:24

## 2019-09-18 RX ADMIN — BENZOCAINE AND MENTHOL 1 LOZENGE: 15; 3.6 LOZENGE ORAL at 11:25

## 2019-09-18 ASSESSMENT — ENCOUNTER SYMPTOMS: SORE THROAT: 1

## 2019-09-18 ASSESSMENT — PAIN SCALES - GENERAL: PAINLEVEL_OUTOF10: 10

## 2019-09-18 NOTE — ED PROVIDER NOTES
[facility] ED  eMERGENCY dEPARTMENT eNCOUnter      Pt Name: Karen Carreon  MRN: [id number]  [place] 1970  Date of evaluation: 9/18/2019  Provider: Cornelio Kumar PA-C    CHIEF COMPLAINT       Chief Complaint   Patient presents with    Pharyngitis             HISTORY OF PRESENT ILLNESS  (Location/Symptom, Timing/Onset, Context/Setting, Quality, Duration, Modifying Factors, Severity.)   Karen Carreon is a 44 y.o. female who presents to the emergencydepartment complaining of a 2-day history of sore throat and myalgias. 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 She states she does have a history of strep throat but has not had it in a long time. She denies cough. No fever or chills. No chest pain or shortness of breath. No abdominal pain. No nausea or vomiting. No other symptoms. She states that she does have some tender lymph nodes noted on her anterior neck. REVIEW OF SYSTEMS    (2-9 systems for level 4, 10 ormore for level 5)     Review of Systems   Constitutional: Negative for chills, fatigue and fever. HENT: Positive for sore throat. Negative for congestion, ear pain, facial swelling, postnasal drip, sinus pressure, trouble swallowing and voice change. Respiratory: Negative for cough and shortness of breath. Cardiovascular: Negative for chest pain, palpitations and leg swelling. Gastrointestinal: Negative for abdominal pain, diarrhea, nausea and vomiting. Genitourinary: Negative for flank pain. Musculoskeletal: Positive for myalgias. Negative for back pain, neck pain and neck stiffness. Skin: Negative for color change. Neurological: Negative for dizziness, facial asymmetry, speech difficulty, weakness, light-headedness, numbness and headaches. Hematological: Positive for adenopathy.          PAST MEDICAL HISTORY         Diagnosis Date    Asthma     Depression     ESBL (extended spectrum beta-lactamase) producing bacteria infection 8/8/20214    E. Coli urine    Fibromyalgia     Gastroparesis     Headache     Hyperlipidemia     Hypertension     Neuropathy     Osteoarthritis     Tennis elbow     right       SURGICAL HISTORY           Procedure Laterality Date    ADENOIDECTOMY      BREAST SURGERY      CARDIAC CATHETERIZATION  7-    COSMETIC SURGERY      nose    HYSTERECTOMY      KNEE SURGERY      SINUS SURGERY      TONSILLECTOMY         CURRENT MEDICATIONS       Discharge Medication List as of 9/18/2019 11:36 AM      CONTINUE these medications which have NOT CHANGED    Details   acetaminophen (TYLENOL) 325 MG tablet Take 2 tablets by mouth every 8 hours as needed for Pain, Disp-60 tablet, R-0Print      diclofenac sodium 1 % GEL Apply 2 g topically 2 times daily, Topical, 2 TIMES DAILY Starting Sat 4/20/2019, Disp-1 Tube, R-0, Print      !! Elastic Bandages & Supports (KNEE BRACE ADJUSTABLE HINGED) MISC PRN Starting Wed 12/19/2018, Disp-1 each, R-0, Print      AMITRIPTYLINE HCL PO Take by mouthHistorical Med      dicyclomine (BENTYL) 10 MG capsule Take 1 capsule by mouth every 6 hours as needed (cramps), Disp-20 capsule, R-0Print      Misc. Devices (WRIST BRACE) MIS 1 Device by Does not apply route daily One brace to be worn at night and daily as needed for carpal tunnel syndrome.  Brace should place the wrist in neutral., Disp-1 each, R-0      EPINEPHrine (EPIPEN) 0.3 MG/0.3ML SOAJ injection Inject 0.3 mg into the muscle as needed Use as directed for allergic reaction      divalproex (DEPAKOTE) 500 MG DR tablet Take 1 tablet by mouth daily, Disp-90 tablet, R-3      metoprolol tartrate (LOPRESSOR) 50 MG tablet 2 IN AM AND 1 AT NIGHT      divalproex (DEPAKOTE ER) 500 MG ER tablet Take 1 tablet by mouth nightly, Disp-30 tablet, R-1      !! Elastic Bandages & Supports (TENNIS ELBOW SUPPORT) MISC PRN Starting 6/2/2015, Until Discontinued, Disp-1 each, R-0, Print      pravastatin (PRAVACHOL) 20 MG tablet Take

## 2019-09-18 NOTE — ED TRIAGE NOTES
Pt states \"I have a weak immune system\" co worker had strep throat cough on pt on Monday and pt immediately felt like couldn't swallow, skin w/d, resp easy, no acute distress noted

## 2019-09-19 ASSESSMENT — ENCOUNTER SYMPTOMS
VOMITING: 0
COLOR CHANGE: 0
SINUS PRESSURE: 0
NAUSEA: 0
TROUBLE SWALLOWING: 0
COUGH: 0
DIARRHEA: 0
SHORTNESS OF BREATH: 0
ABDOMINAL PAIN: 0
VOICE CHANGE: 0
BACK PAIN: 0
FACIAL SWELLING: 0

## 2019-10-07 ENCOUNTER — APPOINTMENT (OUTPATIENT)
Dept: GENERAL RADIOLOGY | Age: 49
End: 2019-10-07

## 2019-10-07 ENCOUNTER — HOSPITAL ENCOUNTER (EMERGENCY)
Age: 49
Discharge: HOME | End: 2019-10-07
Attending: EMERGENCY MEDICINE

## 2019-10-07 VITALS
TEMPERATURE: 98.4 F | OXYGEN SATURATION: 100 % | HEART RATE: 97 BPM | DIASTOLIC BLOOD PRESSURE: 79 MMHG | BODY MASS INDEX: 35.77 KG/M2 | SYSTOLIC BLOOD PRESSURE: 129 MMHG | WEIGHT: 236 LBS | RESPIRATION RATE: 16 BRPM | HEIGHT: 68 IN

## 2019-10-07 DIAGNOSIS — M25.562 LEFT KNEE PAIN, UNSPECIFIED CHRONICITY: Primary | ICD-10-CM

## 2019-10-07 PROCEDURE — 6360000002 HC RX W HCPCS: Performed by: PHYSICIAN ASSISTANT

## 2019-10-07 PROCEDURE — 99283 EMERGENCY DEPT VISIT LOW MDM: CPT

## 2019-10-07 PROCEDURE — 96372 THER/PROPH/DIAG INJ SC/IM: CPT

## 2019-10-07 PROCEDURE — 73562 X-RAY EXAM OF KNEE 3: CPT

## 2019-10-07 RX ORDER — ACETAMINOPHEN AND CODEINE PHOSPHATE 300; 30 MG/1; MG/1
1 TABLET ORAL EVERY 8 HOURS PRN
Qty: 9 TABLET | Refills: 0 | Status: SHIPPED | OUTPATIENT
Start: 2019-10-07 | End: 2019-10-10

## 2019-10-07 RX ORDER — KETOROLAC TROMETHAMINE 30 MG/ML
30 INJECTION, SOLUTION INTRAMUSCULAR; INTRAVENOUS ONCE
Status: COMPLETED | OUTPATIENT
Start: 2019-10-07 | End: 2019-10-07

## 2019-10-07 RX ADMIN — KETOROLAC TROMETHAMINE 30 MG: 30 INJECTION, SOLUTION INTRAMUSCULAR; INTRAVENOUS at 13:52

## 2019-10-07 ASSESSMENT — PAIN DESCRIPTION - PROGRESSION: CLINICAL_PROGRESSION: GRADUALLY WORSENING

## 2019-10-07 ASSESSMENT — PAIN SCALES - GENERAL: PAINLEVEL_OUTOF10: 8

## 2019-10-07 ASSESSMENT — PAIN DESCRIPTION - PAIN TYPE: TYPE: CHRONIC PAIN

## 2019-10-07 ASSESSMENT — PAIN DESCRIPTION - ORIENTATION: ORIENTATION: LEFT

## 2019-10-07 ASSESSMENT — PAIN DESCRIPTION - DESCRIPTORS: DESCRIPTORS: ACHING

## 2019-10-07 ASSESSMENT — PAIN DESCRIPTION - LOCATION: LOCATION: KNEE

## 2019-10-07 ASSESSMENT — PAIN DESCRIPTION - FREQUENCY: FREQUENCY: CONTINUOUS

## 2019-10-07 ASSESSMENT — PAIN DESCRIPTION - ONSET: ONSET: ON-GOING

## 2019-10-14 ENCOUNTER — HOSPITAL ENCOUNTER (OUTPATIENT)
Age: 49
Setting detail: OBSERVATION
Discharge: HOME | End: 2019-10-15
Attending: EMERGENCY MEDICINE | Admitting: EMERGENCY MEDICINE

## 2019-10-14 ENCOUNTER — APPOINTMENT (OUTPATIENT)
Dept: GENERAL RADIOLOGY | Age: 49
End: 2019-10-14

## 2019-10-14 DIAGNOSIS — M25.562 LEFT KNEE PAIN, UNSPECIFIED CHRONICITY: Primary | ICD-10-CM

## 2019-10-14 LAB
ABSOLUTE EOS #: 0.13 K/UL (ref 0–0.44)
ABSOLUTE IMMATURE GRANULOCYTE: <0.03 K/UL (ref 0–0.3)
ABSOLUTE LYMPH #: 2.04 K/UL (ref 1.1–3.7)
ABSOLUTE MONO #: 0.4 K/UL (ref 0.1–1.2)
ANION GAP SERPL CALCULATED.3IONS-SCNC: 13 MMOL/L (ref 9–17)
BASOPHILS # BLD: 0 % (ref 0–2)
BASOPHILS ABSOLUTE: <0.03 K/UL (ref 0–0.2)
BUN BLDV-MCNC: 6 MG/DL (ref 6–20)
BUN/CREAT BLD: ABNORMAL (ref 9–20)
C-REACTIVE PROTEIN: 45.1 MG/L (ref 0–5)
CALCIUM SERPL-MCNC: 8.9 MG/DL (ref 8.6–10.4)
CHLORIDE BLD-SCNC: 100 MMOL/L (ref 98–107)
CO2: 25 MMOL/L (ref 20–31)
CREAT SERPL-MCNC: 0.48 MG/DL (ref 0.5–0.9)
DIFFERENTIAL TYPE: ABNORMAL
EOSINOPHILS RELATIVE PERCENT: 2 % (ref 1–4)
GFR AFRICAN AMERICAN: >60 ML/MIN
GFR NON-AFRICAN AMERICAN: >60 ML/MIN
GFR SERPL CREATININE-BSD FRML MDRD: ABNORMAL ML/MIN/{1.73_M2}
GFR SERPL CREATININE-BSD FRML MDRD: ABNORMAL ML/MIN/{1.73_M2}
GLUCOSE BLD-MCNC: 138 MG/DL (ref 70–99)
HCT VFR BLD CALC: 36.5 % (ref 36.3–47.1)
HEMOGLOBIN: 11.1 G/DL (ref 11.9–15.1)
IMMATURE GRANULOCYTES: 0 %
LYMPHOCYTES # BLD: 29 % (ref 24–43)
MCH RBC QN AUTO: 22.2 PG (ref 25.2–33.5)
MCHC RBC AUTO-ENTMCNC: 30.4 G/DL (ref 28.4–34.8)
MCV RBC AUTO: 73.1 FL (ref 82.6–102.9)
MONOCYTES # BLD: 6 % (ref 3–12)
NRBC AUTOMATED: 0 PER 100 WBC
PDW BLD-RTO: 14.2 % (ref 11.8–14.4)
PLATELET # BLD: 355 K/UL (ref 138–453)
PLATELET ESTIMATE: ABNORMAL
PMV BLD AUTO: 10.9 FL (ref 8.1–13.5)
POTASSIUM SERPL-SCNC: 3.9 MMOL/L (ref 3.7–5.3)
RBC # BLD: 4.99 M/UL (ref 3.95–5.11)
RBC # BLD: ABNORMAL 10*6/UL
SEDIMENTATION RATE, ERYTHROCYTE: 80 MM (ref 0–20)
SEG NEUTROPHILS: 63 % (ref 36–65)
SEGMENTED NEUTROPHILS ABSOLUTE COUNT: 4.38 K/UL (ref 1.5–8.1)
SODIUM BLD-SCNC: 138 MMOL/L (ref 135–144)
WBC # BLD: 7 K/UL (ref 3.5–11.3)
WBC # BLD: ABNORMAL 10*3/UL

## 2019-10-14 PROCEDURE — 85025 COMPLETE CBC W/AUTO DIFF WBC: CPT

## 2019-10-14 PROCEDURE — 6360000002 HC RX W HCPCS: Performed by: STUDENT IN AN ORGANIZED HEALTH CARE EDUCATION/TRAINING PROGRAM

## 2019-10-14 PROCEDURE — 89060 EXAM SYNOVIAL FLUID CRYSTALS: CPT

## 2019-10-14 PROCEDURE — 87205 SMEAR GRAM STAIN: CPT

## 2019-10-14 PROCEDURE — 80048 BASIC METABOLIC PNL TOTAL CA: CPT

## 2019-10-14 PROCEDURE — 86140 C-REACTIVE PROTEIN: CPT

## 2019-10-14 PROCEDURE — 73562 X-RAY EXAM OF KNEE 3: CPT

## 2019-10-14 PROCEDURE — 87070 CULTURE OTHR SPECIMN AEROBIC: CPT

## 2019-10-14 PROCEDURE — 96374 THER/PROPH/DIAG INJ IV PUSH: CPT

## 2019-10-14 PROCEDURE — 85651 RBC SED RATE NONAUTOMATED: CPT

## 2019-10-14 PROCEDURE — 99285 EMERGENCY DEPT VISIT HI MDM: CPT

## 2019-10-14 PROCEDURE — 87075 CULTR BACTERIA EXCEPT BLOOD: CPT

## 2019-10-14 RX ORDER — KETOROLAC TROMETHAMINE 15 MG/ML
15 INJECTION, SOLUTION INTRAMUSCULAR; INTRAVENOUS ONCE
Status: COMPLETED | OUTPATIENT
Start: 2019-10-14 | End: 2019-10-14

## 2019-10-14 RX ADMIN — KETOROLAC TROMETHAMINE 15 MG: 15 INJECTION, SOLUTION INTRAMUSCULAR; INTRAVENOUS at 17:14

## 2019-10-14 ASSESSMENT — PAIN DESCRIPTION - DESCRIPTORS: DESCRIPTORS: BURNING;ACHING;SHOOTING

## 2019-10-14 ASSESSMENT — PAIN DESCRIPTION - ORIENTATION: ORIENTATION: LEFT

## 2019-10-14 ASSESSMENT — PAIN SCALES - GENERAL: PAINLEVEL_OUTOF10: 8

## 2019-10-14 ASSESSMENT — PAIN DESCRIPTION - PAIN TYPE: TYPE: ACUTE PAIN

## 2019-10-14 ASSESSMENT — PAIN DESCRIPTION - LOCATION: LOCATION: LEG

## 2019-10-14 ASSESSMENT — PAIN DESCRIPTION - FREQUENCY: FREQUENCY: CONTINUOUS

## 2019-10-14 ASSESSMENT — PAIN DESCRIPTION - PROGRESSION: CLINICAL_PROGRESSION: NOT CHANGED

## 2019-10-15 VITALS
DIASTOLIC BLOOD PRESSURE: 68 MMHG | WEIGHT: 234.4 LBS | HEART RATE: 76 BPM | TEMPERATURE: 97.7 F | BODY MASS INDEX: 35.52 KG/M2 | RESPIRATION RATE: 18 BRPM | HEIGHT: 68 IN | SYSTOLIC BLOOD PRESSURE: 118 MMHG | OXYGEN SATURATION: 100 %

## 2019-10-15 PROBLEM — M79.605 LEG PAIN, LEFT: Status: ACTIVE | Noted: 2019-10-15

## 2019-10-15 LAB
CRYSTALS, FLUID: NEGATIVE
SPECIMEN TYPE: NORMAL

## 2019-10-15 PROCEDURE — 97530 THERAPEUTIC ACTIVITIES: CPT

## 2019-10-15 PROCEDURE — 96376 TX/PRO/DX INJ SAME DRUG ADON: CPT

## 2019-10-15 PROCEDURE — 6360000002 HC RX W HCPCS: Performed by: STUDENT IN AN ORGANIZED HEALTH CARE EDUCATION/TRAINING PROGRAM

## 2019-10-15 PROCEDURE — 97162 PT EVAL MOD COMPLEX 30 MIN: CPT

## 2019-10-15 PROCEDURE — G0378 HOSPITAL OBSERVATION PER HR: HCPCS

## 2019-10-15 RX ORDER — KETOROLAC TROMETHAMINE 30 MG/ML
15 INJECTION, SOLUTION INTRAMUSCULAR; INTRAVENOUS EVERY 6 HOURS PRN
Status: DISCONTINUED | OUTPATIENT
Start: 2019-10-15 | End: 2019-10-15 | Stop reason: HOSPADM

## 2019-10-15 RX ORDER — ACETAMINOPHEN 325 MG/1
650 TABLET ORAL EVERY 6 HOURS PRN
Qty: 60 TABLET | Refills: 0 | Status: ON HOLD | OUTPATIENT
Start: 2019-10-15 | End: 2021-10-25

## 2019-10-15 RX ADMIN — KETOROLAC TROMETHAMINE 15 MG: 30 INJECTION, SOLUTION INTRAMUSCULAR at 08:36

## 2019-10-15 RX ADMIN — KETOROLAC TROMETHAMINE 15 MG: 30 INJECTION, SOLUTION INTRAMUSCULAR at 15:01

## 2019-10-15 ASSESSMENT — PAIN DESCRIPTION - DESCRIPTORS
DESCRIPTORS: ACHING
DESCRIPTORS: ACHING;BURNING;CRAMPING
DESCRIPTORS: DISCOMFORT

## 2019-10-15 ASSESSMENT — PAIN DESCRIPTION - FREQUENCY
FREQUENCY: CONTINUOUS
FREQUENCY: INTERMITTENT

## 2019-10-15 ASSESSMENT — ENCOUNTER SYMPTOMS
SHORTNESS OF BREATH: 0
VOMITING: 0
COUGH: 0
SORE THROAT: 0
ABDOMINAL PAIN: 0
NAUSEA: 0
PHOTOPHOBIA: 0

## 2019-10-15 ASSESSMENT — PAIN DESCRIPTION - PROGRESSION
CLINICAL_PROGRESSION: NOT CHANGED
CLINICAL_PROGRESSION: NOT CHANGED

## 2019-10-15 ASSESSMENT — PAIN DESCRIPTION - ORIENTATION
ORIENTATION: LEFT

## 2019-10-15 ASSESSMENT — PAIN DESCRIPTION - ONSET
ONSET: ON-GOING
ONSET: ON-GOING

## 2019-10-15 ASSESSMENT — PAIN DESCRIPTION - LOCATION
LOCATION: KNEE
LOCATION: LEG
LOCATION: LEG

## 2019-10-15 ASSESSMENT — PAIN DESCRIPTION - PAIN TYPE
TYPE: ACUTE PAIN
TYPE: CHRONIC PAIN
TYPE: ACUTE PAIN;CHRONIC PAIN

## 2019-10-15 ASSESSMENT — PAIN SCALES - GENERAL
PAINLEVEL_OUTOF10: 10
PAINLEVEL_OUTOF10: 8
PAINLEVEL_OUTOF10: 9
PAINLEVEL_OUTOF10: 10
PAINLEVEL_OUTOF10: 8

## 2019-10-15 ASSESSMENT — PAIN - FUNCTIONAL ASSESSMENT: PAIN_FUNCTIONAL_ASSESSMENT: PREVENTS OR INTERFERES SOME ACTIVE ACTIVITIES AND ADLS

## 2019-11-15 ENCOUNTER — HOSPITAL ENCOUNTER (EMERGENCY)
Age: 49
Discharge: HOME | End: 2019-11-15
Attending: EMERGENCY MEDICINE

## 2019-11-15 VITALS
HEART RATE: 85 BPM | BODY MASS INDEX: 37.28 KG/M2 | SYSTOLIC BLOOD PRESSURE: 161 MMHG | HEIGHT: 68 IN | WEIGHT: 246 LBS | TEMPERATURE: 98.4 F | DIASTOLIC BLOOD PRESSURE: 84 MMHG | OXYGEN SATURATION: 100 % | RESPIRATION RATE: 17 BRPM

## 2019-11-15 DIAGNOSIS — M62.838 SPASM OF MUSCLE: Primary | ICD-10-CM

## 2019-11-15 PROCEDURE — 6360000002 HC RX W HCPCS: Performed by: EMERGENCY MEDICINE

## 2019-11-15 PROCEDURE — 96372 THER/PROPH/DIAG INJ SC/IM: CPT

## 2019-11-15 PROCEDURE — 6370000000 HC RX 637 (ALT 250 FOR IP): Performed by: EMERGENCY MEDICINE

## 2019-11-15 PROCEDURE — 99282 EMERGENCY DEPT VISIT SF MDM: CPT

## 2019-11-15 RX ORDER — CYCLOBENZAPRINE HCL 10 MG
10 TABLET ORAL 3 TIMES DAILY PRN
Qty: 15 TABLET | Refills: 0 | Status: ON HOLD | OUTPATIENT
Start: 2019-11-15 | End: 2022-08-02

## 2019-11-15 RX ORDER — CYCLOBENZAPRINE HCL 10 MG
10 TABLET ORAL ONCE
Status: COMPLETED | OUTPATIENT
Start: 2019-11-15 | End: 2019-11-15

## 2019-11-15 RX ORDER — KETOROLAC TROMETHAMINE 30 MG/ML
30 INJECTION, SOLUTION INTRAMUSCULAR; INTRAVENOUS ONCE
Status: COMPLETED | OUTPATIENT
Start: 2019-11-15 | End: 2019-11-15

## 2019-11-15 RX ORDER — MORPHINE SULFATE 4 MG/ML
4 INJECTION, SOLUTION INTRAMUSCULAR; INTRAVENOUS ONCE
Status: COMPLETED | OUTPATIENT
Start: 2019-11-15 | End: 2019-11-15

## 2019-11-15 RX ORDER — ACETAMINOPHEN AND CODEINE PHOSPHATE 300; 30 MG/1; MG/1
1 TABLET ORAL EVERY 4 HOURS PRN
Qty: 18 TABLET | Refills: 0 | Status: SHIPPED | OUTPATIENT
Start: 2019-11-15 | End: 2019-11-18

## 2019-11-15 RX ADMIN — KETOROLAC TROMETHAMINE 30 MG: 30 INJECTION, SOLUTION INTRAMUSCULAR; INTRAVENOUS at 04:58

## 2019-11-15 RX ADMIN — CYCLOBENZAPRINE 10 MG: 10 TABLET, FILM COATED ORAL at 04:59

## 2019-11-15 RX ADMIN — MORPHINE SULFATE 4 MG: 4 INJECTION, SOLUTION INTRAMUSCULAR; INTRAVENOUS at 04:58

## 2019-11-15 ASSESSMENT — PAIN DESCRIPTION - LOCATION: LOCATION: SHOULDER

## 2019-11-15 ASSESSMENT — PAIN SCALES - GENERAL
PAINLEVEL_OUTOF10: 10
PAINLEVEL_OUTOF10: 4

## 2019-11-15 ASSESSMENT — ENCOUNTER SYMPTOMS
SORE THROAT: 0
BACK PAIN: 0
COUGH: 0
SHORTNESS OF BREATH: 0
DIARRHEA: 0
VOMITING: 0
ABDOMINAL PAIN: 0

## 2019-11-15 ASSESSMENT — PAIN DESCRIPTION - DESCRIPTORS: DESCRIPTORS: SPASM;POUNDING

## 2019-11-15 ASSESSMENT — PAIN DESCRIPTION - FREQUENCY: FREQUENCY: CONTINUOUS

## 2019-11-15 ASSESSMENT — PAIN DESCRIPTION - PAIN TYPE: TYPE: ACUTE PAIN

## 2019-11-15 ASSESSMENT — PAIN DESCRIPTION - ORIENTATION: ORIENTATION: RIGHT

## 2019-11-16 ENCOUNTER — HOSPITAL ENCOUNTER (EMERGENCY)
Age: 49
Discharge: HOME | End: 2019-11-16
Attending: EMERGENCY MEDICINE

## 2019-11-16 ENCOUNTER — APPOINTMENT (OUTPATIENT)
Dept: GENERAL RADIOLOGY | Age: 49
End: 2019-11-16

## 2019-11-16 VITALS
WEIGHT: 245 LBS | TEMPERATURE: 98.3 F | RESPIRATION RATE: 18 BRPM | OXYGEN SATURATION: 100 % | SYSTOLIC BLOOD PRESSURE: 153 MMHG | HEIGHT: 68 IN | HEART RATE: 78 BPM | DIASTOLIC BLOOD PRESSURE: 87 MMHG | BODY MASS INDEX: 37.13 KG/M2

## 2019-11-16 DIAGNOSIS — M62.838 SPASM OF MUSCLE: Primary | ICD-10-CM

## 2019-11-16 DIAGNOSIS — M25.511 ACUTE PAIN OF RIGHT SHOULDER: ICD-10-CM

## 2019-11-16 PROCEDURE — 6360000002 HC RX W HCPCS: Performed by: NURSE PRACTITIONER

## 2019-11-16 PROCEDURE — 73030 X-RAY EXAM OF SHOULDER: CPT

## 2019-11-16 PROCEDURE — 99283 EMERGENCY DEPT VISIT LOW MDM: CPT

## 2019-11-16 PROCEDURE — 6370000000 HC RX 637 (ALT 250 FOR IP): Performed by: NURSE PRACTITIONER

## 2019-11-16 PROCEDURE — 96372 THER/PROPH/DIAG INJ SC/IM: CPT

## 2019-11-16 RX ORDER — LIDOCAINE 50 MG/G
1 PATCH TOPICAL DAILY
Qty: 5 PATCH | Refills: 0 | Status: ON HOLD | OUTPATIENT
Start: 2019-11-16 | End: 2022-08-02

## 2019-11-16 RX ORDER — ORPHENADRINE CITRATE 30 MG/ML
60 INJECTION INTRAMUSCULAR; INTRAVENOUS ONCE
Status: COMPLETED | OUTPATIENT
Start: 2019-11-16 | End: 2019-11-16

## 2019-11-16 RX ORDER — OXYCODONE HYDROCHLORIDE AND ACETAMINOPHEN 5; 325 MG/1; MG/1
1 TABLET ORAL EVERY 8 HOURS PRN
Qty: 9 TABLET | Refills: 0 | Status: SHIPPED | OUTPATIENT
Start: 2019-11-16 | End: 2019-11-19

## 2019-11-16 RX ORDER — KETOROLAC TROMETHAMINE 30 MG/ML
30 INJECTION, SOLUTION INTRAMUSCULAR; INTRAVENOUS ONCE
Status: COMPLETED | OUTPATIENT
Start: 2019-11-16 | End: 2019-11-16

## 2019-11-16 RX ORDER — OXYCODONE HYDROCHLORIDE AND ACETAMINOPHEN 5; 325 MG/1; MG/1
1 TABLET ORAL ONCE
Status: COMPLETED | OUTPATIENT
Start: 2019-11-16 | End: 2019-11-16

## 2019-11-16 RX ADMIN — OXYCODONE HYDROCHLORIDE AND ACETAMINOPHEN 1 TABLET: 5; 325 TABLET ORAL at 16:04

## 2019-11-16 RX ADMIN — KETOROLAC TROMETHAMINE 30 MG: 30 INJECTION, SOLUTION INTRAMUSCULAR; INTRAVENOUS at 16:05

## 2019-11-16 RX ADMIN — ORPHENADRINE CITRATE 60 MG: 30 INJECTION INTRAMUSCULAR; INTRAVENOUS at 16:05

## 2019-11-16 ASSESSMENT — PAIN SCALES - GENERAL
PAINLEVEL_OUTOF10: 7
PAINLEVEL_OUTOF10: 10

## 2019-11-16 ASSESSMENT — ENCOUNTER SYMPTOMS
VOMITING: 0
NAUSEA: 0
SHORTNESS OF BREATH: 0
TROUBLE SWALLOWING: 0
COUGH: 0

## 2019-11-26 ENCOUNTER — HOSPITAL ENCOUNTER (EMERGENCY)
Age: 49
Discharge: HOME | End: 2019-11-26
Attending: EMERGENCY MEDICINE

## 2019-11-26 ENCOUNTER — APPOINTMENT (OUTPATIENT)
Dept: CT IMAGING | Age: 49
End: 2019-11-26

## 2019-11-26 VITALS
OXYGEN SATURATION: 100 % | DIASTOLIC BLOOD PRESSURE: 87 MMHG | TEMPERATURE: 98.4 F | RESPIRATION RATE: 16 BRPM | SYSTOLIC BLOOD PRESSURE: 155 MMHG | HEART RATE: 91 BPM

## 2019-11-26 DIAGNOSIS — M54.2 NECK PAIN: Primary | ICD-10-CM

## 2019-11-26 PROCEDURE — 99283 EMERGENCY DEPT VISIT LOW MDM: CPT

## 2019-11-26 PROCEDURE — 6360000002 HC RX W HCPCS: Performed by: STUDENT IN AN ORGANIZED HEALTH CARE EDUCATION/TRAINING PROGRAM

## 2019-11-26 PROCEDURE — 96372 THER/PROPH/DIAG INJ SC/IM: CPT

## 2019-11-26 PROCEDURE — 72125 CT NECK SPINE W/O DYE: CPT

## 2019-11-26 RX ORDER — KETOROLAC TROMETHAMINE 30 MG/ML
30 INJECTION, SOLUTION INTRAMUSCULAR; INTRAVENOUS ONCE
Status: COMPLETED | OUTPATIENT
Start: 2019-11-26 | End: 2019-11-26

## 2019-11-26 RX ADMIN — KETOROLAC TROMETHAMINE 30 MG: 30 INJECTION, SOLUTION INTRAMUSCULAR at 03:14

## 2019-11-26 ASSESSMENT — PAIN DESCRIPTION - ORIENTATION: ORIENTATION: RIGHT

## 2019-11-26 ASSESSMENT — PAIN DESCRIPTION - FREQUENCY: FREQUENCY: CONTINUOUS

## 2019-11-26 ASSESSMENT — PAIN SCALES - GENERAL
PAINLEVEL_OUTOF10: 10
PAINLEVEL_OUTOF10: 10

## 2019-11-26 ASSESSMENT — PAIN DESCRIPTION - LOCATION: LOCATION: SHOULDER;NECK

## 2019-11-26 ASSESSMENT — PAIN DESCRIPTION - DESCRIPTORS: DESCRIPTORS: BURNING;CRAMPING

## 2019-11-27 ASSESSMENT — ENCOUNTER SYMPTOMS
SINUS PAIN: 0
BACK PAIN: 1
RHINORRHEA: 0
SINUS PRESSURE: 0
CHEST TIGHTNESS: 0
WHEEZING: 0
CONSTIPATION: 0
VOMITING: 0
ABDOMINAL PAIN: 0
TROUBLE SWALLOWING: 0
SORE THROAT: 0
BLOOD IN STOOL: 0
DIARRHEA: 0
SHORTNESS OF BREATH: 0
COUGH: 0
NAUSEA: 0
EYE PAIN: 0
EYE REDNESS: 0

## 2020-03-25 ENCOUNTER — CARE COORDINATION (OUTPATIENT)
Dept: CARE COORDINATION | Age: 50
End: 2020-03-25

## 2020-03-25 ENCOUNTER — HOSPITAL ENCOUNTER (EMERGENCY)
Age: 50
Discharge: HOME | End: 2020-03-25
Attending: EMERGENCY MEDICINE

## 2020-03-25 VITALS
TEMPERATURE: 98.2 F | OXYGEN SATURATION: 100 % | DIASTOLIC BLOOD PRESSURE: 84 MMHG | BODY MASS INDEX: 35.36 KG/M2 | HEART RATE: 79 BPM | RESPIRATION RATE: 16 BRPM | SYSTOLIC BLOOD PRESSURE: 143 MMHG | WEIGHT: 220 LBS | HEIGHT: 66 IN

## 2020-03-25 PROCEDURE — 6360000002 HC RX W HCPCS: Performed by: STUDENT IN AN ORGANIZED HEALTH CARE EDUCATION/TRAINING PROGRAM

## 2020-03-25 PROCEDURE — 99282 EMERGENCY DEPT VISIT SF MDM: CPT

## 2020-03-25 PROCEDURE — 6370000000 HC RX 637 (ALT 250 FOR IP): Performed by: STUDENT IN AN ORGANIZED HEALTH CARE EDUCATION/TRAINING PROGRAM

## 2020-03-25 RX ORDER — CIPROFLOXACIN HYDROCHLORIDE 3.5 MG/ML
2 SOLUTION/ DROPS TOPICAL
Status: DISCONTINUED | OUTPATIENT
Start: 2020-03-25 | End: 2020-03-25 | Stop reason: HOSPADM

## 2020-03-25 RX ORDER — CIPROFLOXACIN HYDROCHLORIDE 3.5 MG/ML
2 SOLUTION/ DROPS TOPICAL
Status: DISCONTINUED | OUTPATIENT
Start: 2020-03-25 | End: 2020-03-25

## 2020-03-25 RX ORDER — AMOXICILLIN AND CLAVULANATE POTASSIUM 875; 125 MG/1; MG/1
1 TABLET, FILM COATED ORAL 2 TIMES DAILY
Qty: 20 TABLET | Refills: 0 | Status: SHIPPED | OUTPATIENT
Start: 2020-03-25 | End: 2020-04-04

## 2020-03-25 RX ORDER — KETOROLAC TROMETHAMINE 30 MG/ML
30 INJECTION, SOLUTION INTRAMUSCULAR; INTRAVENOUS ONCE
Status: DISCONTINUED | OUTPATIENT
Start: 2020-03-25 | End: 2020-03-25

## 2020-03-25 RX ORDER — AMOXICILLIN AND CLAVULANATE POTASSIUM 250; 125 MG/1; MG/1
1 TABLET, FILM COATED ORAL ONCE
Status: COMPLETED | OUTPATIENT
Start: 2020-03-25 | End: 2020-03-25

## 2020-03-25 RX ORDER — KETOROLAC TROMETHAMINE 30 MG/ML
30 INJECTION, SOLUTION INTRAMUSCULAR; INTRAVENOUS ONCE
Status: COMPLETED | OUTPATIENT
Start: 2020-03-25 | End: 2020-03-25

## 2020-03-25 RX ADMIN — AMOXICILLIN AND CLAVULANATE POTASSIUM 1 TABLET: 250; 125 TABLET, FILM COATED ORAL at 03:24

## 2020-03-25 RX ADMIN — CIPROFLOXACIN HYDROCHLORIDE 2 DROP: 3.5 SOLUTION/ DROPS TOPICAL at 03:24

## 2020-03-25 RX ADMIN — KETOROLAC TROMETHAMINE 30 MG: 30 INJECTION, SOLUTION INTRAMUSCULAR; INTRAVENOUS at 03:24

## 2020-03-25 ASSESSMENT — PAIN SCALES - GENERAL
PAINLEVEL_OUTOF10: 9
PAINLEVEL_OUTOF10: 10

## 2020-03-25 NOTE — ED PROVIDER NOTES
[facility]  ED  Emergency Department Encounter  Emergency Medicine Resident     Pt Name: Tim Roca  MRN: [id number]  [place] 1970  Date of evaluation: 3/25/20  PCP:  No primary care provider on file. CHIEF COMPLAINT       Chief Complaint   Patient presents with    Ear Fullness     pt reports drainage in ear tubes. HISTORY OFPRESENT ILLNESS  (Location/Symptom, Timing/Onset, Context/Setting, Quality, Duration, Modifying Factors,Severity.)      Tim Roca is a 44 y.o. female who presents with concern for bilateral ear pain. Patient has a history of tympanostomy tubes placed, and has not followed up with ear, nose, throat doctor secondary to insurance complications. Patient states she has green purulent discharge draining from her right ear and pain on her left ear. Patient states she has been here before, per chart review she was seen here in September of last year with an ear wick placed and placed on antibiotics. Patient denies any fever, nausea, vomiting, chills. Patient denies any mastoid tenderness. Patient has not had antibiotics recently. PAST MEDICAL / SURGICAL / SOCIAL / FAMILY HISTORY      has a past medical history of Asthma, Depression, ESBL (extended spectrum beta-lactamase) producing bacteria infection, Fibromyalgia, Gastroparesis, Headache, Hyperlipidemia, Hypertension, Neuropathy, Osteoarthritis, and Tennis elbow. has a past surgical history that includes Hysterectomy; Breast surgery; Tonsillectomy; Adenoidectomy; sinus surgery; Cosmetic surgery; knee surgery; and Cardiac catheterization (7-).      Social History     Socioeconomic History    Marital status: Single     Spouse name: Not on file    Number of children: Not on file    Years of education: Not on file    Highest education level: Not on file   Occupational History    Not on file   Social Needs    Financial resource strain: Not on file    Food insecurity     Worry: Not on file Inability: Not on file    Transportation needs     Medical: Not on file     Non-medical: Not on file   Tobacco Use    Smoking status: Never Smoker    Smokeless tobacco: Never Used   Substance and Sexual Activity    Alcohol use: Yes     Comment: occasionally    Drug use: Yes     Types: Marijuana    Sexual activity: Not on file   Lifestyle    Physical activity     Days per week: Not on file     Minutes per session: Not on file    Stress: Not on file   Relationships    Social connections     Talks on phone: Not on file     Gets together: Not on file     Attends Scientologist service: Not on file     Active member of club or organization: Not on file     Attends meetings of clubs or organizations: Not on file     Relationship status: Not on file    Intimate partner violence     Fear of current or ex partner: Not on file     Emotionally abused: Not on file     Physically abused: Not on file     Forced sexual activity: Not on file   Other Topics Concern    Not on file   Social History Narrative    Not on file       Family History   Problem Relation Age of Onset    Cancer Mother         breast and bone    Heart Disease Father     Diabetes Sister     High Blood Pressure Sister     Diabetes Brother     High Blood Pressure Brother     Heart Disease Maternal Grandmother     Cancer Maternal Grandmother         breast    Cancer Paternal Grandmother     Heart Disease Paternal Grandfather         Allergies:  Latex; Dye [iodides]; Motrin [ibuprofen]; Norco [hydrocodone-acetaminophen]; Tramadol; Naproxen; and Tylenol [acetaminophen]    Home Medications:  Prior to Admission medications    Medication Sig Start Date End Date Taking?  Authorizing Provider   amoxicillin-clavulanate (AUGMENTIN) 875-125 MG per tablet Take 1 tablet by mouth 2 times daily for 10 days 3/25/20 4/4/20 Yes Lucia Banda,    lidocaine (LIDODERM) 5 % Place 1 patch onto the skin daily 12 hours on, 12 hours off. 11/16/19   ANNA Prado - CNP   cyclobenzaprine (FLEXERIL) 10 MG tablet Take 1 tablet by mouth 3 times daily as needed for Muscle spasms 11/15/19   Sadia Smith MD   acetaminophen (TYLENOL) 325 MG tablet Take 2 tablets by mouth every 6 hours as needed for Pain 10/15/19   Carolina Nagy,    benzocaine-menthol (CEPACOL SORE THROAT) 15-3.6 MG lozenge Take 1 lozenge by mouth every 2 hours as needed for Sore Throat 9/18/19   Deanna Banda PA-C   diclofenac sodium 1 % GEL Apply 2 g topically 2 times daily 4/20/19   Deb Worrell MD   Elastic Bandages & Supports (KNEE BRACE ADJUSTABLE HINGED) MISC 1 Device by Does not apply route as needed (comfort) 12/19/18   Rosy Burdick MD   AMITRIPTYLINE HCL PO Take by mouth    Historical Provider, MD   dicyclomine (BENTYL) 10 MG capsule Take 1 capsule by mouth every 6 hours as needed (cramps) 5/26/18   Chrystal Jade, DO   Misc. Devices (WRIST BRACE) MISC 1 Device by Does not apply route daily One brace to be worn at night and daily as needed for carpal tunnel syndrome. Brace should place the wrist in neutral. 2/20/17   Shelley Scales, DO   EPINEPHrine (EPIPEN) 0.3 MG/0.3ML SOAJ injection Inject 0.3 mg into the muscle as needed Use as directed for allergic reaction    Historical Provider, MD   divalproex (DEPAKOTE) 500 MG DR tablet Take 1 tablet by mouth daily 9/13/16   Andrzej Jara MD   metoprolol tartrate (LOPRESSOR) 50 MG tablet 2 IN AM AND 1 AT NIGHT 4/26/16   Historical Provider, MD   divalproex (DEPAKOTE ER) 500 MG ER tablet Take 1 tablet by mouth nightly 5/20/16   Fuentes Drake, DO   Elastic Bandages & Supports (TENNIS ELBOW SUPPORT) MISC 1 Device by Does not apply route as needed 6/2/15   Hortencia Monroe MD   pravastatin (PRAVACHOL) 20 MG tablet Take 20 mg by mouth daily. Historical Provider, MD   albuterol (VENTOLIN HFA) 108 (90 BASE) MCG/ACT inhaler Inhale 2 puffs into the lungs every 6 hours as needed for Wheezing.  9/9/14   Marisa Gates,    ondansetron (ZOFRAN) 4 MG tablet Take 1 tablet by mouth every 8 hours as needed for Nausea. 8/8/14   David Guaman DO   indomethacin (INDOCIN) 50 MG capsule Take 1 capsule by mouth 3 times daily (with meals). 7/26/14   Harshad Espinoza MD   citalopram (CELEXA) 20 MG tablet Take 40 mg by mouth daily. Historical Provider, MD   losartan (COZAAR) 50 MG tablet Take 100 mg by mouth daily. Historical Provider, MD       REVIEW OFSYSTEMS    (2-9 systems for level 4, 10 or more for level 5)      Constitutional ROS - No recent fevers, No recent chills  Neurological ROS - No Headache, No Syncope  Opthalmologic ROS- No eye pain, No vision changes   ENT ROS - No sore throat, No congestion, +ear pain   Respiratory ROS - No cough, No shortness of breath  Cardiovascular ROS - No chest pain, No palpitations   Gastrointestinal ROS - No abdominal pain, No nausea, No vomiting  Genito-Urinary ROS - No dysuria, Nohematuria  Musculoskeletal ROS - No back pain, No neck pain  Dermatological ROS - No wound, No rash  PHYSICAL EXAM   (up to 7 for level 4, 8 or more forlevel 5)      INITIAL VITALS:   ED Triage Vitals   BP Temp Temp Source Pulse Resp SpO2 Height Weight   03/25/20 0150 03/25/20 0141 03/25/20 0141 03/25/20 0150 03/25/20 0150 03/25/20 0150 03/25/20 0150 03/25/20 0150   (!) 143/84 97.7 °F (36.5 °C) Oral 79 16 100 % 5' 6\" (1.676 m) 220 lb (99.8 kg)       Physical Exam  Constitutional:       Appearance: She is well-developed. HENT:      Head: Normocephalic and atraumatic. Comments: No tenderness on the right mastoid or left mastoid, no swelling to the face     Ears:      Comments: Bilateral ear canals have crusted areas in the external ear canal consistent with external otitis infection, right ear has purulence at the tympanic membrane consistent with separative otitis media. Eyes:      Pupils: Pupils are equal, round, and reactive to light. Neck:      Musculoskeletal: Normal range of motion and neck supple. Cardiovascular:      Rate and Rhythm: Normal rate and regular rhythm. Pulmonary:      Effort: Pulmonary effort is normal. No respiratory distress. Breath sounds: Normal breath sounds. No stridor. Abdominal:      General: Bowel sounds are normal. There is no distension. Palpations: Abdomen is soft. Musculoskeletal: Normal range of motion. General: No deformity. Skin:     General: Skin is warm and dry. Capillary Refill: Capillary refill takes less than 2 seconds. Neurological:      Mental Status: She is alert and oriented to person, place, and time. Psychiatric:         Behavior: Behavior normal.         DIFFERENTIAL  DIAGNOSIS     PLAN (LABS / IMAGING / EKG):  Orders Placed This Encounter   Procedures   Brenda Mondragon MD, Otolaryngology, [address]:  Orders Placed This Encounter   Medications    amoxicillin-clavulanate (AUGMENTIN) 250-125 MG per tablet 1 tablet    ciprofloxacin (CILOXAN) 0.3 % ophthalmic solution 2 drop    amoxicillin-clavulanate (AUGMENTIN) 875-125 MG per tablet     Sig: Take 1 tablet by mouth 2 times daily for 10 days     Dispense:  20 tablet     Refill:  0       DDX: Otitis externa, otitis media, separative otitis media, malignant otitis media, mastoiditis, chronic otitis media, complication of tympanostomy tubes    Initial MDM/Plan: 44 y.o. female who presents with concern for recurrent bilateral ear infections. Patient has tympanostomy tubes that she thinks that need removed. Patient has problems with her insurance at this time and does not have a ear nose throat doctor. Patient states she has had greenish color ear discharge from her right ear and pain on bilateral ears for 1 week. On examination patient does have purulence that is separative in nature at the TM on the right side, and she has bilateral otitis externa. We will treat patient with topical eardrops for otitis externa, ciprofloxacin and prescribe patient p.o.

## 2020-03-25 NOTE — ED PROVIDER NOTES
[facility]     Emergency Department     Faculty Attestation    I performed a history and physical examination of the patient and discussed management with the resident. I have reviewed and agree with the residents findings including all diagnostic interpretations, and treatment plans as written. Any areas of disagreement are noted on the chart. I was personally present for the key portions of any procedures. I have documented in the chart those procedures where I was not present during the key portions. I have reviewed the emergency nurses triage note. I agree with the chief complaint, past medical history, past surgical history, allergies, medications, social and family history as documented unless otherwise noted below. Documentation of the HPI, Physical Exam and Medical Decision Making performed by gordon is based on my personal performance of the HPI, PE and MDM. For Physician Assistant/ Nurse Practitioner cases/documentation I have personally evaluated this patient and have completed at least one if not all key elements of the E/M (history, physical exam, and MDM). Additional findings are as noted. 43 yo F bilateral ear pain, no fever, no sob, no cough, pt could not follow up d/t insurance,   pe vss, non toxic, bilateral tragus tenderness, no tenderness to either mastoid region,     Treating otitis, pt stable for out pt tx  EKG Interpretation    Interpreted by me      CRITICAL CARE: There was a high probability of clinically significant/life threatening deterioration in this patient's condition which required my urgent intervention. Total critical care time was 0 minutes. This excludes any time for separately reportable procedures.        [place], DO  03/25/20 1 [facility],[address], DO  03/25/20 [id number]

## 2020-03-25 NOTE — CARE COORDINATION
Patient contacted regarding recent discharge and COVID-19 risk   Care Transition Nurse/ Ambulatory Care Manager contacted the patient by telephone to perform post discharge assessment. Verified name and  with patient as identifiers. Patient has following risk factors of: asthma. CTN/ACM reviewed discharge instructions, medical action plan and red flags related to discharge diagnosis. Reviewed and educated them on any new and changed medications related to discharge diagnosis. Advised obtaining a 90-day supply of all daily and as-needed medications. Education provided regarding infection prevention, and signs and symptoms of COVID-19 and when to seek medical attention with patient who verbalized understanding. Discussed exposure protocols and quarantine from [address] you at higher risk for severe illness  and given an opportunity for questions and concerns. The patient agrees to contact the COVID-19 hotline [phone] or PCP office for questions related to their healthcare. CTN/ACM provided contact information for future reference. From CDC: Are you at higher risk for severe illness?  Wash your hands often.  Avoid close contact (6 feet, which is about two arm lengths) with people who are sick.  Put distance between yourself and other people if COVID-19 is spreading in your community.  Clean and disinfect frequently touched surfaces.  Avoid all cruise travel and non-essential air travel.  Call your healthcare professional if you have concerns about COVID-19 and your underlying condition or if you are sick.     For more information on steps you can take to protect yourself, see CDC's How to Protect Yourself

## 2020-03-25 NOTE — ED TRIAGE NOTES
Pt reports problem with her ears, she has tubes placed and is concerned because she is having green drainage out of the right and blood in the left

## 2020-04-08 ENCOUNTER — CARE COORDINATION (OUTPATIENT)
Dept: CARE COORDINATION | Age: 50
End: 2020-04-08

## 2020-09-21 ENCOUNTER — HOSPITAL ENCOUNTER (EMERGENCY)
Age: 50
Discharge: HOME | End: 2020-09-21
Attending: EMERGENCY MEDICINE

## 2020-09-21 VITALS
TEMPERATURE: 98.4 F | HEART RATE: 92 BPM | SYSTOLIC BLOOD PRESSURE: 139 MMHG | DIASTOLIC BLOOD PRESSURE: 74 MMHG | OXYGEN SATURATION: 100 % | RESPIRATION RATE: 16 BRPM

## 2020-09-21 PROCEDURE — 96372 THER/PROPH/DIAG INJ SC/IM: CPT

## 2020-09-21 PROCEDURE — 6360000002 HC RX W HCPCS: Performed by: STUDENT IN AN ORGANIZED HEALTH CARE EDUCATION/TRAINING PROGRAM

## 2020-09-21 PROCEDURE — 99283 EMERGENCY DEPT VISIT LOW MDM: CPT

## 2020-09-21 RX ORDER — KETOROLAC TROMETHAMINE 30 MG/ML
30 INJECTION, SOLUTION INTRAMUSCULAR; INTRAVENOUS ONCE
Status: COMPLETED | OUTPATIENT
Start: 2020-09-21 | End: 2020-09-21

## 2020-09-21 RX ADMIN — KETOROLAC TROMETHAMINE 30 MG: 30 INJECTION, SOLUTION INTRAMUSCULAR; INTRAVENOUS at 16:37

## 2020-09-21 ASSESSMENT — ENCOUNTER SYMPTOMS
SORE THROAT: 0
NAUSEA: 0
VOMITING: 0
BACK PAIN: 0
EYE PAIN: 0
ABDOMINAL PAIN: 0
SHORTNESS OF BREATH: 0

## 2020-09-21 ASSESSMENT — PAIN SCALES - GENERAL: PAINLEVEL_OUTOF10: 10

## 2020-09-21 NOTE — ED TRIAGE NOTES
Pt to ED c/o left ankle pain and possible bug bite. Pt stated she was at work last Thursday when the pain began. Pt doesn't remember what caused the pain but believes it to be a possible bug bite. Pt had the ankle in a compression sleeve Pt stated the compression helps reduce the pain. Pt able to ambulate ok, full ROM in foot and toes, pulses in tact. Resting on stretcher, NAD noted. Call light in reach.

## 2021-10-25 ENCOUNTER — APPOINTMENT (OUTPATIENT)
Dept: CT IMAGING | Age: 51
End: 2021-10-25

## 2021-10-25 ENCOUNTER — APPOINTMENT (OUTPATIENT)
Dept: GENERAL RADIOLOGY | Age: 51
End: 2021-10-25

## 2021-10-25 ENCOUNTER — HOSPITAL ENCOUNTER (OUTPATIENT)
Age: 51
Setting detail: OBSERVATION
Discharge: HOME | End: 2021-10-26
Attending: EMERGENCY MEDICINE | Admitting: EMERGENCY MEDICINE

## 2021-10-25 DIAGNOSIS — R07.9 CHEST PAIN, UNSPECIFIED TYPE: Primary | ICD-10-CM

## 2021-10-25 LAB
ABSOLUTE EOS #: 0.06 K/UL (ref 0–0.4)
ABSOLUTE IMMATURE GRANULOCYTE: 0 K/UL (ref 0–0.3)
ABSOLUTE LYMPH #: 2.34 K/UL (ref 1–4.8)
ABSOLUTE MONO #: 0.34 K/UL (ref 0.1–0.8)
ANION GAP SERPL CALCULATED.3IONS-SCNC: 12 MMOL/L (ref 9–17)
BASOPHILS # BLD: 0 % (ref 0–2)
BASOPHILS ABSOLUTE: 0 K/UL (ref 0–0.2)
BNP INTERPRETATION: ABNORMAL
BUN BLDV-MCNC: 7 MG/DL (ref 6–20)
BUN/CREAT BLD: ABNORMAL (ref 9–20)
CALCIUM SERPL-MCNC: 9.2 MG/DL (ref 8.6–10.4)
CHLORIDE BLD-SCNC: 103 MMOL/L (ref 98–107)
CO2: 21 MMOL/L (ref 20–31)
CREAT SERPL-MCNC: 0.52 MG/DL (ref 0.5–0.9)
DIFFERENTIAL TYPE: ABNORMAL
EOSINOPHILS RELATIVE PERCENT: 1 % (ref 1–4)
GFR AFRICAN AMERICAN: >60 ML/MIN
GFR NON-AFRICAN AMERICAN: >60 ML/MIN
GFR SERPL CREATININE-BSD FRML MDRD: ABNORMAL ML/MIN/{1.73_M2}
GFR SERPL CREATININE-BSD FRML MDRD: ABNORMAL ML/MIN/{1.73_M2}
GLUCOSE BLD-MCNC: 129 MG/DL (ref 70–99)
HCT VFR BLD CALC: 38.6 % (ref 36.3–47.1)
HEMOGLOBIN: 11.7 G/DL (ref 11.9–15.1)
IMMATURE GRANULOCYTES: 0 %
LYMPHOCYTES # BLD: 41 % (ref 24–44)
MAGNESIUM: 1.7 MG/DL (ref 1.6–2.6)
MCH RBC QN AUTO: 22 PG (ref 25.2–33.5)
MCHC RBC AUTO-ENTMCNC: 30.3 G/DL (ref 28.4–34.8)
MCV RBC AUTO: 72.7 FL (ref 82.6–102.9)
MONOCYTES # BLD: 6 % (ref 1–7)
MORPHOLOGY: ABNORMAL
NRBC AUTOMATED: 0 PER 100 WBC
PDW BLD-RTO: 14.8 % (ref 11.8–14.4)
PLATELET # BLD: ABNORMAL K/UL (ref 138–453)
PLATELET ESTIMATE: ABNORMAL
PLATELET, FLUORESCENCE: NORMAL K/UL (ref 138–453)
PMV BLD AUTO: ABNORMAL FL (ref 8.1–13.5)
POTASSIUM SERPL-SCNC: 3.5 MMOL/L (ref 3.7–5.3)
PRO-BNP: 467 PG/ML
RBC # BLD: 5.31 M/UL (ref 3.95–5.11)
RBC # BLD: ABNORMAL 10*6/UL
SARS-COV-2, RAPID: NOT DETECTED
SEG NEUTROPHILS: 52 % (ref 36–66)
SEGMENTED NEUTROPHILS ABSOLUTE COUNT: 2.96 K/UL (ref 1.8–7.7)
SODIUM BLD-SCNC: 136 MMOL/L (ref 135–144)
SPECIMEN DESCRIPTION: NORMAL
TROPONIN INTERP: NORMAL
TROPONIN T: NORMAL NG/ML
TROPONIN, HIGH SENSITIVITY: <6 NG/L (ref 0–14)
WBC # BLD: 5.7 K/UL (ref 3.5–11.3)
WBC # BLD: ABNORMAL 10*3/UL

## 2021-10-25 PROCEDURE — 85055 RETICULATED PLATELET ASSAY: CPT

## 2021-10-25 PROCEDURE — 6360000002 HC RX W HCPCS: Performed by: STUDENT IN AN ORGANIZED HEALTH CARE EDUCATION/TRAINING PROGRAM

## 2021-10-25 PROCEDURE — G0378 HOSPITAL OBSERVATION PER HR: HCPCS

## 2021-10-25 PROCEDURE — 6360000004 HC RX CONTRAST MEDICATION: Performed by: STUDENT IN AN ORGANIZED HEALTH CARE EDUCATION/TRAINING PROGRAM

## 2021-10-25 PROCEDURE — 84484 ASSAY OF TROPONIN QUANT: CPT

## 2021-10-25 PROCEDURE — 71045 X-RAY EXAM CHEST 1 VIEW: CPT

## 2021-10-25 PROCEDURE — 2580000003 HC RX 258: Performed by: STUDENT IN AN ORGANIZED HEALTH CARE EDUCATION/TRAINING PROGRAM

## 2021-10-25 PROCEDURE — 71275 CT ANGIOGRAPHY CHEST: CPT

## 2021-10-25 PROCEDURE — 6370000000 HC RX 637 (ALT 250 FOR IP): Performed by: STUDENT IN AN ORGANIZED HEALTH CARE EDUCATION/TRAINING PROGRAM

## 2021-10-25 PROCEDURE — 83880 ASSAY OF NATRIURETIC PEPTIDE: CPT

## 2021-10-25 PROCEDURE — 80048 BASIC METABOLIC PNL TOTAL CA: CPT

## 2021-10-25 PROCEDURE — 85025 COMPLETE CBC W/AUTO DIFF WBC: CPT

## 2021-10-25 PROCEDURE — 96374 THER/PROPH/DIAG INJ IV PUSH: CPT

## 2021-10-25 PROCEDURE — 99285 EMERGENCY DEPT VISIT HI MDM: CPT

## 2021-10-25 PROCEDURE — 6370000000 HC RX 637 (ALT 250 FOR IP): Performed by: NURSE PRACTITIONER

## 2021-10-25 PROCEDURE — 87635 SARS-COV-2 COVID-19 AMP PRB: CPT

## 2021-10-25 PROCEDURE — 93005 ELECTROCARDIOGRAM TRACING: CPT | Performed by: STUDENT IN AN ORGANIZED HEALTH CARE EDUCATION/TRAINING PROGRAM

## 2021-10-25 PROCEDURE — 83735 ASSAY OF MAGNESIUM: CPT

## 2021-10-25 RX ORDER — SODIUM CHLORIDE 0.9 % (FLUSH) 0.9 %
5-40 SYRINGE (ML) INJECTION EVERY 12 HOURS SCHEDULED
Status: DISCONTINUED | OUTPATIENT
Start: 2021-10-25 | End: 2021-10-26 | Stop reason: HOSPADM

## 2021-10-25 RX ORDER — OXYCODONE HYDROCHLORIDE 5 MG/1
5 TABLET ORAL EVERY 6 HOURS PRN
Status: DISCONTINUED | OUTPATIENT
Start: 2021-10-25 | End: 2021-10-26

## 2021-10-25 RX ORDER — LIDOCAINE 4 G/G
1 PATCH TOPICAL DAILY
Status: DISCONTINUED | OUTPATIENT
Start: 2021-10-25 | End: 2021-10-26 | Stop reason: HOSPADM

## 2021-10-25 RX ORDER — CYCLOBENZAPRINE HCL 10 MG
10 TABLET ORAL ONCE
Status: COMPLETED | OUTPATIENT
Start: 2021-10-25 | End: 2021-10-25

## 2021-10-25 RX ORDER — METOPROLOL TARTRATE 50 MG/1
100 TABLET, FILM COATED ORAL EVERY MORNING
Status: DISCONTINUED | OUTPATIENT
Start: 2021-10-26 | End: 2021-10-26 | Stop reason: HOSPADM

## 2021-10-25 RX ORDER — METOCLOPRAMIDE HYDROCHLORIDE 5 MG/ML
10 INJECTION INTRAMUSCULAR; INTRAVENOUS PRN
Status: COMPLETED | OUTPATIENT
Start: 2021-10-25 | End: 2021-10-25

## 2021-10-25 RX ORDER — METOPROLOL TARTRATE 50 MG/1
50 TABLET, FILM COATED ORAL 2 TIMES DAILY
Status: DISCONTINUED | OUTPATIENT
Start: 2021-10-25 | End: 2021-10-25 | Stop reason: ALTCHOICE

## 2021-10-25 RX ORDER — ONDANSETRON 2 MG/ML
4 INJECTION INTRAMUSCULAR; INTRAVENOUS EVERY 6 HOURS PRN
Status: DISCONTINUED | OUTPATIENT
Start: 2021-10-25 | End: 2021-10-26 | Stop reason: HOSPADM

## 2021-10-25 RX ORDER — LOSARTAN POTASSIUM 50 MG/1
100 TABLET ORAL DAILY
Status: DISCONTINUED | OUTPATIENT
Start: 2021-10-26 | End: 2021-10-26 | Stop reason: HOSPADM

## 2021-10-25 RX ORDER — SODIUM CHLORIDE 0.9 % (FLUSH) 0.9 %
5-40 SYRINGE (ML) INJECTION PRN
Status: DISCONTINUED | OUTPATIENT
Start: 2021-10-25 | End: 2021-10-26 | Stop reason: HOSPADM

## 2021-10-25 RX ORDER — PRAVASTATIN SODIUM 20 MG
20 TABLET ORAL DAILY
Status: DISCONTINUED | OUTPATIENT
Start: 2021-10-26 | End: 2021-10-26 | Stop reason: HOSPADM

## 2021-10-25 RX ORDER — ONDANSETRON 4 MG/1
4 TABLET, ORALLY DISINTEGRATING ORAL EVERY 8 HOURS PRN
Status: DISCONTINUED | OUTPATIENT
Start: 2021-10-25 | End: 2021-10-26 | Stop reason: HOSPADM

## 2021-10-25 RX ORDER — OXYCODONE HYDROCHLORIDE 5 MG/1
10 TABLET ORAL EVERY 6 HOURS PRN
Status: DISCONTINUED | OUTPATIENT
Start: 2021-10-25 | End: 2021-10-26

## 2021-10-25 RX ORDER — SODIUM CHLORIDE 9 MG/ML
25 INJECTION, SOLUTION INTRAVENOUS PRN
Status: DISCONTINUED | OUTPATIENT
Start: 2021-10-25 | End: 2021-10-26 | Stop reason: HOSPADM

## 2021-10-25 RX ORDER — DIPHENHYDRAMINE HCL 25 MG
25 TABLET ORAL EVERY 6 HOURS PRN
Status: DISCONTINUED | OUTPATIENT
Start: 2021-10-25 | End: 2021-10-26 | Stop reason: HOSPADM

## 2021-10-25 RX ORDER — METOPROLOL TARTRATE 50 MG/1
50 TABLET, FILM COATED ORAL NIGHTLY
Status: DISCONTINUED | OUTPATIENT
Start: 2021-10-25 | End: 2021-10-26 | Stop reason: HOSPADM

## 2021-10-25 RX ORDER — ACETAMINOPHEN 500 MG
1000 TABLET ORAL ONCE
Status: DISCONTINUED | OUTPATIENT
Start: 2021-10-25 | End: 2021-10-25 | Stop reason: ALTCHOICE

## 2021-10-25 RX ADMIN — CYCLOBENZAPRINE 10 MG: 10 TABLET, FILM COATED ORAL at 18:55

## 2021-10-25 RX ADMIN — OXYCODONE 10 MG: 5 TABLET ORAL at 22:00

## 2021-10-25 RX ADMIN — IOPAMIDOL 100 ML: 755 INJECTION, SOLUTION INTRAVENOUS at 19:48

## 2021-10-25 RX ADMIN — DIPHENHYDRAMINE HCL 25 MG: 25 TABLET ORAL at 22:00

## 2021-10-25 RX ADMIN — METOCLOPRAMIDE 10 MG: 5 INJECTION, SOLUTION INTRAMUSCULAR; INTRAVENOUS at 19:58

## 2021-10-25 RX ADMIN — SODIUM CHLORIDE, PRESERVATIVE FREE 10 ML: 5 INJECTION INTRAVENOUS at 23:06

## 2021-10-25 RX ADMIN — METOPROLOL TARTRATE 50 MG: 50 TABLET, FILM COATED ORAL at 22:47

## 2021-10-25 ASSESSMENT — ENCOUNTER SYMPTOMS
ABDOMINAL PAIN: 0
VOMITING: 0
SHORTNESS OF BREATH: 1
BACK PAIN: 1
CHEST TIGHTNESS: 0
COUGH: 0
TROUBLE SWALLOWING: 0
BACK PAIN: 0
NAUSEA: 0

## 2021-10-25 ASSESSMENT — PAIN DESCRIPTION - LOCATION: LOCATION: SHOULDER;NECK;BACK

## 2021-10-25 ASSESSMENT — PAIN SCALES - GENERAL
PAINLEVEL_OUTOF10: 9

## 2021-10-25 ASSESSMENT — PAIN DESCRIPTION - PAIN TYPE: TYPE: ACUTE PAIN

## 2021-10-25 ASSESSMENT — HEART SCORE: ECG: 0

## 2021-10-25 ASSESSMENT — PAIN DESCRIPTION - ORIENTATION: ORIENTATION: LEFT

## 2021-10-25 ASSESSMENT — PAIN DESCRIPTION - DESCRIPTORS: DESCRIPTORS: BURNING;SHARP

## 2021-10-25 ASSESSMENT — PAIN DESCRIPTION - FREQUENCY: FREQUENCY: INTERMITTENT

## 2021-10-25 NOTE — ED PROVIDER NOTES
cases/documentation I have had a face to face evaluation of this patient and have completed at least one if not all key elements of the E/M (history, physical exam, and MDM). Additional findings are as noted. For APC cases I have personally evaluated and examined the patient in conjunction with the APC and agree with the treatment plan and disposition of the patient as recorded by the APC.     Jojo Espinosa MD  Attending Emergency  Physician       Tanika Rao MD  10/25/21 [id number]

## 2021-10-25 NOTE — LETTER
Anusha Ovalle was seen and treated in our emergency department from  10/25/2021 to 10/26/2021. She may return to work on 10/30/2021. If you have any questions or concerns, please don't hesitate to call.     Hossein Mendez, DO  Emergency Medicine Resident

## 2021-10-25 NOTE — ED TRIAGE NOTES
Pt co SOB and pain with inhalation. Hx of asthma. Pain radiates to neck and back, left side, burning/stabbing quality, 9/10. Onset of symptoms x 2 days. Pt alert and oriented x 4, respirations even and unlabored, NAD noted at this time. Will continue to monitor.

## 2021-10-25 NOTE — ED PROVIDER NOTES
[facility] ED  Emergency Department Encounter  Emergency Medicine Resident     Pt Name: Grzegorz Kwon  MRN: [id number]  [place] 1970  Date of evaluation: 10/25/21  PCP:  No primary care provider on file. CHIEF COMPLAINT       Chief Complaint   Patient presents with    Shortness of Breath       HISTORY OFPRESENT ILLNESS  (Location/Symptom, Timing/Onset, Context/Setting, Quality, Duration, Modifying Factors,Severity.)      Grzegorz Kwon is a 46 y. o.yo female who presents with chest pain, SOB. Patient here with chest pain radiating to the left neck area, describes it as a burning sensation states that there is also muscular pain in the back that is on the left flank area of thoracic chest, denies any tearing sensation or pain radiating through the chest and into the back. States that she has history of fibromyalgia has not taken her Covid vaccination and does have hypertension at baseline. 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 States the pain is 6 out of 10 in severity located on the left anterior chest worse with palpation of that area. Denies hematuria, dysuria, headache or vision changes or focal deficits. Denies fevers or chills. Has mild shortness of breath. PAST MEDICAL / SURGICAL / SOCIAL / FAMILY HISTORY      has a past medical history of Asthma, Depression, ESBL (extended spectrum beta-lactamase) producing bacteria infection, Fibromyalgia, Gastroparesis, Headache, Hyperlipidemia, Hypertension, Neuropathy, Osteoarthritis, and Tennis elbow. has a past surgical history that includes Hysterectomy; Breast surgery; Tonsillectomy; Adenoidectomy; sinus surgery;  Cosmetic surgery; knee surgery; and Cardiac catheterization (7-). Social History     Socioeconomic History    Marital status: Single     Spouse name: Not on file    Number of children: Not on file    Years of education: Not on file    Highest education level: Not on file   Occupational History    Not on file   Tobacco Use    Smoking status: Never Smoker    Smokeless tobacco: Never Used   Vaping Use    Vaping Use: Never used   Substance and Sexual Activity    Alcohol use: Yes     Comment: occasionally    Drug use: Yes     Types: Marijuana    Sexual activity: Not on file   Other Topics Concern    Not on file   Social History Narrative    Not on file     Social Determinants of Health     Financial Resource Strain:     Difficulty of Paying Living Expenses:    Food Insecurity:     Worried About Running Out of Food in the Last Year:     [address] in the Last Year:    Transportation Needs:     Lack of Transportation (Medical):      Lack of Transportation (Non-Medical):    Physical Activity:     Days of Exercise per Week:     Minutes of Exercise per Session:    Stress:     Feeling of Stress :    Social Connections:     Frequency of Communication with Friends and Family:     Frequency of Social Gatherings with Friends and Family:     Attends [employer] Services:     Active Member of Clubs or Organizations:     Attends Club or Organization Meetings:     Marital Status:    Intimate Partner Violence:     Fear of Current or Ex-Partner:     Emotionally Abused:     Physically Abused:     Sexually Abused:        Family History   Problem Relation Age of Onset    Cancer Mother         breast and bone    Heart Disease Father     Diabetes Sister     High Blood Pressure Sister     Diabetes Brother     High Blood Pressure Brother     Heart Disease Maternal Grandmother     Cancer Maternal Grandmother         breast    Cancer Paternal Grandmother     Heart Disease Paternal Grandfather         Allergies:  Latex, Motrin [ibuprofen], Norco [hydrocodone-acetaminophen], Tramadol, Naproxen, and Tylenol [acetaminophen]    Home Medications:  Prior to Admission medications    Medication Sig Start Date End Date Taking? Authorizing Provider   lidocaine (LIDODERM) 5 % Place 1 patch onto the skin daily 12 hours on, 12 hours off. 11/16/19   ANNA Yates CNP   cyclobenzaprine (FLEXERIL) 10 MG tablet Take 1 tablet by mouth 3 times daily as needed for Muscle spasms 11/15/19   Kris Martinez MD   acetaminophen (TYLENOL) 325 MG tablet Take 2 tablets by mouth every 6 hours as needed for Pain 10/15/19   Kris Nagy,    benzocaine-menthol (CEPACOL SORE THROAT) 15-3.6 MG lozenge Take 1 lozenge by mouth every 2 hours as needed for Sore Throat 9/18/19   Andrés Pradhan PA-C   diclofenac sodium 1 % GEL Apply 2 g topically 2 times daily 4/20/19   Rufina Reynolds MD   Elastic Bandages & Supports (KNEE BRACE ADJUSTABLE HINGED) MISC 1 Device by Does not apply route as needed (comfort) 12/19/18   Mane Rodriguez MD   AMITRIPTYLINE HCL PO Take by mouth    Historical Provider, MD   dicyclomine (BENTYL) 10 MG capsule Take 1 capsule by mouth every 6 hours as needed (cramps) 5/26/18   Grupo Damon, DO   Misc. Devices (WRIST BRACE) MISC 1 Device by Does not apply route daily One brace to be worn at night and daily as needed for carpal tunnel syndrome.  Brace should place the wrist in neutral. 2/20/17   Kailash Matthew, DO   EPINEPHrine (EPIPEN) 0.3 MG/0.3ML SOAJ injection Inject 0.3 mg into the muscle as needed Use as directed for allergic reaction    Historical Provider, MD   divalproex (DEPAKOTE) 500 MG DR tablet Take 1 tablet by mouth daily 9/13/16   Paige Llanos MD   metoprolol tartrate (LOPRESSOR) 50 MG tablet 2 IN AM AND 1 AT NIGHT 4/26/16   Historical Provider, MD   divalproex (DEPAKOTE ER) 500 MG ER tablet Take 1 tablet by mouth nightly 5/20/16   [address] Blood, DO   Elastic Bandages & Supports (TENNIS ELBOW SUPPORT) MISC 1 Device by Does not apply route as needed 6/2/15   José Beckman MD   pravastatin (PRAVACHOL) 20 MG tablet Take 20 mg by mouth daily. Historical Provider, MD   albuterol (VENTOLIN HFA) 108 (90 BASE) MCG/ACT inhaler Inhale 2 puffs into the lungs every 6 hours as needed for Wheezing. 9/9/14   Kameron Hanson, DO   ondansetron (ZOFRAN) 4 MG tablet Take 1 tablet by mouth every 8 hours as needed for Nausea. 8/8/14   Kody Guaman, DO   indomethacin (INDOCIN) 50 MG capsule Take 1 capsule by mouth 3 times daily (with meals). 7/26/14   Rose Zhong MD   citalopram (CELEXA) 20 MG tablet Take 40 mg by mouth daily. Historical Provider, MD   losartan (COZAAR) 50 MG tablet Take 100 mg by mouth daily. Historical Provider, MD       REVIEW OFSYSTEMS    (2-9 systems for level 4, 10 or more for level 5)      Review of Systems   Constitutional: Negative for diaphoresis and fever. HENT: Negative for congestion. Eyes: Negative for visual disturbance. Respiratory: Positive for shortness of breath. Cardiovascular: Positive for chest pain. Gastrointestinal: Negative for abdominal pain, nausea and vomiting. Endocrine: Negative for polyuria. Genitourinary: Negative for dysuria. Musculoskeletal: Negative for back pain. Skin: Negative for wound. Neurological: Negative for headaches. Psychiatric/Behavioral: Negative for confusion. PHYSICAL EXAM   (up to 7 for level 4, 8 or more forlevel 5)      ED TRIAGE VITALS BP: (!) 182/84, Temp: 98.2 °F (36.8 °C), Pulse: 79, Resp: 18, SpO2: 99 %    Vitals:    10/25/21 1743 10/25/21 1816 10/25/21 1831 10/25/21 1846   BP:  (!) 173/95 (!) 178/96 (!) 155/75   Pulse: 79 67 63 68   Resp:       Temp:       TempSrc:       SpO2: 99% 100% 100% 100%       Physical Exam  Constitutional:       General: She is not in acute distress. Appearance: She is well-developed. HENT:      Head: Normocephalic and atraumatic.       Nose: Nose normal.   Eyes:      Pupils: Pupils are equal, round, and reactive to light. Cardiovascular:      Rate and Rhythm: Normal rate and regular rhythm. Heart sounds: No murmur heard. Pulmonary:      Effort: Pulmonary effort is normal. No respiratory distress. Breath sounds: No stridor. No wheezing. Chest:      Chest wall: Tenderness present. Abdominal:      General: There is no distension. Palpations: Abdomen is soft. Tenderness: There is no abdominal tenderness. Musculoskeletal:         General: No tenderness. Normal range of motion. Cervical back: Normal range of motion and neck supple. Skin:     General: Skin is warm and dry. Capillary Refill: Capillary refill takes less than 2 seconds. Findings: No erythema or rash. Neurological:      Mental Status: She is alert and oriented to person, place, and time. Sensory: No sensory deficit. Deep Tendon Reflexes: Reflexes normal.   Psychiatric:         Behavior: Behavior normal.         DIFFERENTIAL  DIAGNOSIS     PLAN (LABS / IMAGING / EKG):  Orders Placed This Encounter   Procedures    XR CHEST PORTABLE    CTA CHEST W WO CONTRAST    CBC Auto Differential    Basic Metabolic Panel w/ Reflex to MG    Troponin    Brain Natriuretic Peptide    Immature Platelet Fraction    Magnesium    EKG 12 Lead    Saline lock IV    Insert peripheral IV    PATIENT STATUS (FROM ED OR OR/PROCEDURAL) Observation       MEDICATIONS ORDERED:  Orders Placed This Encounter   Medications    acetaminophen (TYLENOL) tablet 1,000 mg    cyclobenzaprine (FLEXERIL) tablet 10 mg    lidocaine 4 % external patch 1 patch    metoclopramide (REGLAN) injection 10 mg    iopamidol (ISOVUE-370) 76 % injection 100 mL       DDX:     ACS, CHF, pneumonia, musculoskeletal, dissection    Initial MDM/Plan: 46 y.o. female who presents with chest pain, SOB.      ACS versus CHF:  Chest pain over 6 hours  Patient tropes negative  BNP negative  EKG does not show pulmonary edema  No cardiomegaly  Does have elevated heart score  No cath for over 5 years  Plan for cardiology evaluation and admission    Musculoskeletal:  Reproducible pain in the anterior left chest  Muscle spasms throughout upper back  Flexeril  Chest pain is likely muscle skeletal however unable to rule out cardiac etiology completely    Dissection:  Patient does have some pain in the back and chest  Has hypertension at baseline  CT dissection evaluation  No apparent dissection    Disposition:  Admission to ETU for cardiac evaluation      DIAGNOSTIC RESULTS / EMERGENCYDEPARTMENT COURSE / MDM     LABS:  Results for orders placed or performed during the hospital encounter of 10/25/21   CBC Auto Differential   Result Value Ref Range    WBC 5.7 3.5 - 11.3 k/uL    RBC 5.31 (H) 3.95 - 5.11 m/uL    Hemoglobin 11.7 (L) 11.9 - 15.1 g/dL    Hematocrit 38.6 36.3 - 47.1 %    MCV 72.7 (L) 82.6 - 102.9 fL    MCH 22.0 (L) 25.2 - 33.5 pg    MCHC 30.3 28.4 - 34.8 g/dL    RDW 14.8 (H) 11.8 - 14.4 %    Platelets See Reflexed IPF Result 138 - 453 k/uL    MPV NOT REPORTED 8.1 - 13.5 fL    NRBC Automated 0.0 0.0 per 100 WBC    Differential Type NOT REPORTED     WBC Morphology NOT REPORTED     RBC Morphology NOT REPORTED     Platelet Estimate NOT REPORTED     Immature Granulocytes 0 0 %    Seg Neutrophils 52 36 - 66 %    Lymphocytes 41 24 - 44 %    Monocytes 6 1 - 7 %    Eosinophils % 1 1 - 4 %    Basophils 0 0 - 2 %    Absolute Immature Granulocyte 0.00 0.00 - 0.30 k/uL    Segs Absolute 2.96 1.8 - 7.7 k/uL    Absolute Lymph # 2.34 1.0 - 4.8 k/uL    Absolute Mono # 0.34 0.1 - 0.8 k/uL    Absolute Eos # 0.06 0.0 - 0.4 k/uL    Basophils Absolute 0.00 0.0 - 0.2 k/uL    Morphology MICROCYTOSIS PRESENT    Basic Metabolic Panel w/ Reflex to MG   Result Value Ref Range    Glucose 129 (H) 70 - 99 mg/dL    BUN 7 6 - 20 mg/dL    CREATININE 0.52 0.50 - 0.90 mg/dL    Bun/Cre Ratio NOT REPORTED 9 - 20    Calcium 9.2 8.6 - 10.4 mg/dL    Sodium 136 135 - 144 mmol/L Potassium 3.5 (L) 3.7 - 5.3 mmol/L    Chloride 103 98 - 107 mmol/L    CO2 21 20 - 31 mmol/L    Anion Gap 12 9 - 17 mmol/L    GFR Non-African American >60 >60 mL/min    GFR African American >60 >60 mL/min    GFR Comment          GFR Staging NOT REPORTED    Troponin   Result Value Ref Range    Troponin, High Sensitivity <6 0 - 14 ng/L    Troponin T NOT REPORTED <0.03 ng/mL    Troponin Interp NOT REPORTED    Brain Natriuretic Peptide   Result Value Ref Range    Pro- (H) <300 pg/mL    BNP Interpretation NOT REPORTED    Immature Platelet Fraction   Result Value Ref Range    Platelet, Fluorescence Platelet clumps present, count appears adequate. 138 - 453 k/uL   Magnesium   Result Value Ref Range    Magnesium 1.7 1.6 - 2.6 mg/dL       RADIOLOGY:  XR CHEST PORTABLE   Final Result   Stable mild cardiomegaly; slightly greater prominence interstitial markings   but no clear cut pulmonary edema. No consolidation or pleural effusion. CTA CHEST W WO CONTRAST    (Results Pending)       EKG  No ST segment ovation depression seen on EKG    All EKG's are interpreted by the Emergency Department Physicianwho either signs or Co-signs this chart in the absence of a cardiologist.    EMERGENCY DEPARTMENT COURSE:  ED Course as of Oct 25 1959   Mon Oct 25, 2021   1819 Seen and assessed in the emergency department no acute respiratory cardiovascular distress. Patient here with chest pain radiating to the left neck area, describes it as a burning sensation states that there is also muscular pain in the back that is on the left flank area of thoracic chest, denies any tearing sensation or pain radiating through the chest and into the back. States that she has history of fibromyalgia has not taken her Covid vaccination and does have hypertension at baseline.   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 States the pain is 6 out of 10 in severity located on the left anterior chest worse with palpation of that area. Denies hematuria, dysuria, headache or vision changes or focal deficits. Denies fevers or chills. Has mild shortness of breath. [PS]   1849 Pain over 4 hours does not need second trop     Troponin, High Sensitivity: <6 [PS]   1855 Patient had a heart score 4 had a discussion about staying and this is likely not cardiac however unable to rule out cardiac etiology completely as she has not had a cath in 5 years, patient agreeable for ETU admission and cardiology evaluation. [PS]   1904 Placing a CT a for dissection evaluation patient will be admitted to ETU will communicate this information with the ETU resident    [PS]      ED Course User Index  [PS] Rl Villegas MD          PROCEDURES:  None    CONSULTS:  IP CONSULT TO CARDIOLOGY    CRITICAL CARE:  Please see attending note    FINAL IMPRESSION      1. Chest pain, unspecified type          DISPOSITION / [address] Admitted 10/25/2021 07:02:23 PM       PATIENT REFERRED TO:  No follow-up provider specified.     DISCHARGE MEDICATIONS:  New Prescriptions    No medications on file       Rl Villegas MD  Emergency Medicine Resident    (Please note that portions of this note were completed with a voice recognition program.Efforts were made to edit the dictations but occasionally words are mis-transcribed.)       Rl Villegas MD  Resident  10/25/21 2003

## 2021-10-25 NOTE — FLOWSHEET NOTE
SPIRITUAL CARE DEPARTMENT - [address]  PROGRESS NOTE    Shift date: 10/25/21  Shift day: Monday   Shift # 2    Room # 21/21   Name: Jean Murray            Age: 46 y.o. Gender: female          Mosque: [address] [employer]:     Referral: Routine Visit    Admit Date & Time: 10/25/2021  5:26 PM    PATIENT/EVENT DESCRIPTION:  Jean Murray is a 46 y.o. female         SPIRITUAL ASSESSMENT/INTERVENTION:  Patient appeared to welcome  presence to room. Patient has strong family support and also [employer] community is praying for Eddie Rios. Patient engaged in conversation and shared feelings and offered areas to pray about.  listened and validated patient feelings and triumphs and facilitated prayer to nuture hope. Patient accepted and expressed gratitude. SPIRITUAL CARE FOLLOW-UP PLAN:  Chaplains will remain available to offer spiritual and emotional support as needed. Electronically signed by Macario Marlow Resident, on 10/25/2021 at 7:27 PM.  [facility]  [phone]       10/25/21 1926   Encounter Summary   Services provided to: Patient   Referral/Consult From: [address] [facility];Family members; Other (Comment)  ([address])   Continue Visiting   (10/25/21)   Complexity of Encounter Moderate   Length of Encounter 30 minutes   Spiritual Assessment Completed Yes   Spiritual/Episcopalian   Type Spiritual support   Assessment Approachable;Tearful; Hopeful;Coping   Intervention Active listening;Explored feelings, thoughts, concerns;Prayer;Sustaining presence/ Ministry of presence   Outcome Comfort;Expressed gratitude

## 2021-10-26 ENCOUNTER — APPOINTMENT (OUTPATIENT)
Dept: NUCLEAR MEDICINE | Age: 51
End: 2021-10-26

## 2021-10-26 VITALS
WEIGHT: 266.5 LBS | TEMPERATURE: 97.2 F | RESPIRATION RATE: 18 BRPM | HEIGHT: 68 IN | OXYGEN SATURATION: 100 % | DIASTOLIC BLOOD PRESSURE: 67 MMHG | BODY MASS INDEX: 40.39 KG/M2 | SYSTOLIC BLOOD PRESSURE: 120 MMHG | HEART RATE: 51 BPM

## 2021-10-26 LAB
EKG ATRIAL RATE: 64 BPM
EKG P AXIS: 24 DEGREES
EKG P-R INTERVAL: 184 MS
EKG Q-T INTERVAL: 462 MS
EKG QRS DURATION: 82 MS
EKG QTC CALCULATION (BAZETT): 476 MS
EKG R AXIS: 60 DEGREES
EKG T AXIS: 47 DEGREES
EKG VENTRICULAR RATE: 64 BPM
HCG QUALITATIVE: NEGATIVE
LV EF: 60 %
LVEF MODALITY: NORMAL
TROPONIN INTERP: NORMAL
TROPONIN T: NORMAL NG/ML
TROPONIN, HIGH SENSITIVITY: <6 NG/L (ref 0–14)

## 2021-10-26 PROCEDURE — 6370000000 HC RX 637 (ALT 250 FOR IP): Performed by: NURSE PRACTITIONER

## 2021-10-26 PROCEDURE — G0378 HOSPITAL OBSERVATION PER HR: HCPCS

## 2021-10-26 PROCEDURE — 36415 COLL VENOUS BLD VENIPUNCTURE: CPT

## 2021-10-26 PROCEDURE — 2580000003 HC RX 258: Performed by: STUDENT IN AN ORGANIZED HEALTH CARE EDUCATION/TRAINING PROGRAM

## 2021-10-26 PROCEDURE — 3430000000 HC RX DIAGNOSTIC RADIOPHARMACEUTICAL: Performed by: STUDENT IN AN ORGANIZED HEALTH CARE EDUCATION/TRAINING PROGRAM

## 2021-10-26 PROCEDURE — 93005 ELECTROCARDIOGRAM TRACING: CPT | Performed by: EMERGENCY MEDICINE

## 2021-10-26 PROCEDURE — 6360000002 HC RX W HCPCS: Performed by: STUDENT IN AN ORGANIZED HEALTH CARE EDUCATION/TRAINING PROGRAM

## 2021-10-26 PROCEDURE — 84484 ASSAY OF TROPONIN QUANT: CPT

## 2021-10-26 PROCEDURE — 93017 CV STRESS TEST TRACING ONLY: CPT

## 2021-10-26 PROCEDURE — A9500 TC99M SESTAMIBI: HCPCS | Performed by: STUDENT IN AN ORGANIZED HEALTH CARE EDUCATION/TRAINING PROGRAM

## 2021-10-26 PROCEDURE — 84703 CHORIONIC GONADOTROPIN ASSAY: CPT

## 2021-10-26 PROCEDURE — 6370000000 HC RX 637 (ALT 250 FOR IP): Performed by: STUDENT IN AN ORGANIZED HEALTH CARE EDUCATION/TRAINING PROGRAM

## 2021-10-26 PROCEDURE — 78452 HT MUSCLE IMAGE SPECT MULT: CPT

## 2021-10-26 PROCEDURE — 96372 THER/PROPH/DIAG INJ SC/IM: CPT

## 2021-10-26 RX ORDER — HYDROXYZINE HYDROCHLORIDE 25 MG/1
25 TABLET, FILM COATED ORAL ONCE
Status: COMPLETED | OUTPATIENT
Start: 2021-10-26 | End: 2021-10-26

## 2021-10-26 RX ORDER — SODIUM CHLORIDE 0.9 % (FLUSH) 0.9 %
10 SYRINGE (ML) INJECTION PRN
Status: DISCONTINUED | OUTPATIENT
Start: 2021-10-26 | End: 2021-10-26 | Stop reason: HOSPADM

## 2021-10-26 RX ORDER — KETOROLAC TROMETHAMINE 30 MG/ML
15 INJECTION, SOLUTION INTRAMUSCULAR; INTRAVENOUS ONCE
Status: COMPLETED | OUTPATIENT
Start: 2021-10-26 | End: 2021-10-26

## 2021-10-26 RX ORDER — METOPROLOL TARTRATE 5 MG/5ML
5 INJECTION INTRAVENOUS EVERY 5 MIN PRN
Status: DISCONTINUED | OUTPATIENT
Start: 2021-10-26 | End: 2021-10-26 | Stop reason: ALTCHOICE

## 2021-10-26 RX ORDER — ACETAMINOPHEN 325 MG/1
650 TABLET ORAL EVERY 6 HOURS PRN
Status: DISCONTINUED | OUTPATIENT
Start: 2021-10-26 | End: 2021-10-26 | Stop reason: HOSPADM

## 2021-10-26 RX ORDER — SODIUM CHLORIDE 0.9 % (FLUSH) 0.9 %
5-40 SYRINGE (ML) INJECTION PRN
Status: DISCONTINUED | OUTPATIENT
Start: 2021-10-26 | End: 2021-10-26 | Stop reason: ALTCHOICE

## 2021-10-26 RX ORDER — SODIUM CHLORIDE 9 MG/ML
500 INJECTION, SOLUTION INTRAVENOUS CONTINUOUS PRN
Status: DISCONTINUED | OUTPATIENT
Start: 2021-10-26 | End: 2021-10-26 | Stop reason: ALTCHOICE

## 2021-10-26 RX ORDER — AMINOPHYLLINE DIHYDRATE 25 MG/ML
50 INJECTION, SOLUTION INTRAVENOUS PRN
Status: DISCONTINUED | OUTPATIENT
Start: 2021-10-26 | End: 2021-10-26 | Stop reason: ALTCHOICE

## 2021-10-26 RX ORDER — ALBUTEROL SULFATE 90 UG/1
2 AEROSOL, METERED RESPIRATORY (INHALATION) PRN
Status: DISCONTINUED | OUTPATIENT
Start: 2021-10-26 | End: 2021-10-26 | Stop reason: ALTCHOICE

## 2021-10-26 RX ORDER — ATROPINE SULFATE 0.1 MG/ML
0.5 INJECTION INTRAVENOUS EVERY 5 MIN PRN
Status: DISCONTINUED | OUTPATIENT
Start: 2021-10-26 | End: 2021-10-26 | Stop reason: ALTCHOICE

## 2021-10-26 RX ORDER — NITROGLYCERIN 0.4 MG/1
0.4 TABLET SUBLINGUAL EVERY 5 MIN PRN
Status: DISCONTINUED | OUTPATIENT
Start: 2021-10-26 | End: 2021-10-26 | Stop reason: ALTCHOICE

## 2021-10-26 RX ORDER — PREDNISONE 20 MG/1
20 TABLET ORAL DAILY
Qty: 5 TABLET | Refills: 0 | Status: SHIPPED | OUTPATIENT
Start: 2021-10-26 | End: 2021-10-31

## 2021-10-26 RX ORDER — KETOROLAC TROMETHAMINE 30 MG/ML
15 INJECTION, SOLUTION INTRAMUSCULAR; INTRAVENOUS ONCE
Status: DISCONTINUED | OUTPATIENT
Start: 2021-10-26 | End: 2021-10-26

## 2021-10-26 RX ADMIN — SODIUM CHLORIDE, PRESERVATIVE FREE 10 ML: 5 INJECTION INTRAVENOUS at 10:32

## 2021-10-26 RX ADMIN — PRAVASTATIN SODIUM 20 MG: 20 TABLET ORAL at 08:57

## 2021-10-26 RX ADMIN — TETRAKIS(2-METHOXYISOBUTYLISOCYANIDE)COPPER(I) TETRAFLUOROBORATE 12.7 MILLICURIE: 1 INJECTION, POWDER, LYOPHILIZED, FOR SOLUTION INTRAVENOUS at 08:55

## 2021-10-26 RX ADMIN — ENOXAPARIN SODIUM 40 MG: 40 INJECTION SUBCUTANEOUS at 08:56

## 2021-10-26 RX ADMIN — SODIUM CHLORIDE, PRESERVATIVE FREE 10 ML: 5 INJECTION INTRAVENOUS at 08:54

## 2021-10-26 RX ADMIN — REGADENOSON 0.4 MG: 0.08 INJECTION, SOLUTION INTRAVENOUS at 10:31

## 2021-10-26 RX ADMIN — OXYCODONE 10 MG: 5 TABLET ORAL at 05:25

## 2021-10-26 RX ADMIN — HYDROXYZINE HYDROCHLORIDE 25 MG: 25 TABLET, FILM COATED ORAL at 02:40

## 2021-10-26 RX ADMIN — SODIUM CHLORIDE, PRESERVATIVE FREE 10 ML: 5 INJECTION INTRAVENOUS at 08:55

## 2021-10-26 RX ADMIN — SODIUM CHLORIDE, PRESERVATIVE FREE 10 ML: 5 INJECTION INTRAVENOUS at 10:31

## 2021-10-26 RX ADMIN — LOSARTAN POTASSIUM 100 MG: 50 TABLET ORAL at 08:56

## 2021-10-26 RX ADMIN — KETOROLAC TROMETHAMINE 15 MG: 30 INJECTION, SOLUTION INTRAMUSCULAR at 15:36

## 2021-10-26 RX ADMIN — DIPHENHYDRAMINE HCL 25 MG: 25 TABLET ORAL at 05:25

## 2021-10-26 RX ADMIN — SODIUM CHLORIDE, PRESERVATIVE FREE 10 ML: 5 INJECTION INTRAVENOUS at 10:04

## 2021-10-26 RX ADMIN — TETRAKIS(2-METHOXYISOBUTYLISOCYANIDE)COPPER(I) TETRAFLUOROBORATE 39 MILLICURIE: 1 INJECTION, POWDER, LYOPHILIZED, FOR SOLUTION INTRAVENOUS at 10:32

## 2021-10-26 ASSESSMENT — PAIN SCALES - GENERAL
PAINLEVEL_OUTOF10: 4
PAINLEVEL_OUTOF10: 8
PAINLEVEL_OUTOF10: 5
PAINLEVEL_OUTOF10: 7

## 2021-10-26 NOTE — H&P
[address]  CDU / OBSERVATION ENCOUNTER  RESIDENT NOTE     Pt Name: Sailaja Prather  MRN: [id number]  [place] 1970  Date of evaluation: 10/25/21  Patient's PCP is : No primary care provider on file. CHIEF COMPLAINT       Chief Complaint   Patient presents with    Shortness of Breath         HISTORY OF PRESENT ILLNESS    Sailaja Prather is a 46 y.o. female who presents complaints of mild shortness of breath and left side chest pain radiating to the neck that she describes as a burning sensation. Patient also complains of a burning sensation in her chest and back and states she has a history of fibromyalgia. Patient currently denies associated symptoms such as numbness, tingling, fever and chills. Location/Symptom: Left-sided chest pain  Timing/Onset: 1 day  Provocation: Unknown  Quality: Burning sensation  Radiation: Left side neck and back  Severity: 6/10  Timing/Duration: Intermittent  Modifying Factors: n/a    REVIEW OF SYSTEMS       Review of Systems   Constitutional: Negative for appetite change, diaphoresis and fever. HENT: Negative for trouble swallowing. Eyes: Negative for visual disturbance. Respiratory: Positive for shortness of breath. Negative for cough and chest tightness. Cardiovascular: Positive for chest pain. Gastrointestinal: Negative for abdominal pain, nausea and vomiting. Genitourinary: Negative for difficulty urinating. Musculoskeletal: Positive for arthralgias, back pain and myalgias. Neurological: Negative for dizziness, light-headedness and numbness. Psychiatric/Behavioral: Negative for agitation and behavioral problems. (PQRS) Advance directives on face sheet per hospital policy.  No change unless specifically mentioned in chart    PAST MEDICAL HISTORY    has a past medical history of Asthma, Depression, ESBL (extended spectrum beta-lactamase) producing bacteria infection, Fibromyalgia, Gastroparesis, Headache, Hyperlipidemia, Hypertension, Neuropathy, Osteoarthritis, and Tennis elbow. I have reviewed the past medical history with the patient and it is pertinent to this complaint. SURGICAL HISTORY      has a past surgical history that includes Hysterectomy; Breast surgery; Tonsillectomy; Adenoidectomy; sinus surgery; Cosmetic surgery; knee surgery; and Cardiac catheterization (2015). I have reviewed and agree with Surgical History entered and it is pertinent to this complaint. CURRENT MEDICATIONS     acetaminophen (TYLENOL) tablet 1,000 mg, Once  lidocaine 4 % external patch 1 patch, Daily        All medication charted and reviewed. ALLERGIES     is allergic to latex, motrin [ibuprofen], norco [hydrocodone-acetaminophen], tramadol, naproxen, and tylenol [acetaminophen]. FAMILY HISTORY     She indicated that her mother is . She indicated that her father is alive. She indicated that the status of her sister is unknown. She indicated that the status of her brother is unknown. She indicated that the status of her maternal grandmother is unknown. She indicated that the status of her paternal grandmother is unknown. She indicated that the status of her paternal grandfather is unknown.     family history includes Cancer in her maternal grandmother, mother, and paternal grandmother; Diabetes in her brother and sister; Heart Disease in her father, maternal grandmother, and paternal grandfather; High Blood Pressure in her brother and sister. The patient denies any pertinent family history. I have reviewed and agree with the family history entered. I have reviewed the Family History and it is not significant to the case    SOCIAL HISTORY      reports that she has never smoked. She has never used smokeless tobacco. She reports current alcohol use. She reports current drug use. Drug: Marijuana. I have reviewed and agree with all Social.  There are no concerns for substance abuse/use.     PHYSICAL EXAM     INITIAL LABS:  I have reviewed and interpreted all available lab results.   Labs Reviewed   CBC WITH AUTO DIFFERENTIAL - Abnormal; Notable for the following components:       Result Value    RBC 5.31 (*)     Hemoglobin 11.7 (*)     MCV 72.7 (*)     MCH 22.0 (*)     RDW 14.8 (*)     All other components within normal limits   BASIC METABOLIC PANEL W/ REFLEX TO MG FOR LOW K - Abnormal; Notable for the following components:    Glucose 129 (*)     Potassium 3.5 (*)     All other components within normal limits   BRAIN NATRIURETIC PEPTIDE - Abnormal; Notable for the following components:    Pro- (*)     All other components within normal limits   COVID-19, RAPID   TROPONIN   IMMATURE PLATELET FRACTION   MAGNESIUM       SCREENING TOOLS:    HEART Risk Score for Chest Pain Patients   History and Physical Exam Suspicion Level  (Nausea, Vomiting, Diaphoresis, Radiation, Exertion)   Slightly Suspicious (0 pts)   Moderately Suspicious (1 pt)   Highly Suspicious (2 pts)   EKG Interpretation   Normal (0 pts)   Non-Specific Repolarization Disturbance (1 pt)   Significant ST-Depression (2 pts)   Age of Patient (in years)   = 39 (0 pts)   46-64 (1 pt)   = 65 (2 pts)   Risk Factors   No Risk Factors (0 pts)   1-2 Risk Factors (1 pt)   = 3 Risk Factors (2 pts)   Risk Factors Include:   Hypercholesterolemia   Hypertension   Diabetes Mellitus   Cigarette smoking   Positive family history   Obesity   CAD   (SLE, CKDz, HIV, Cocaine abuse)   Troponin Levels   = Normal Limit (0 pts)   1-3 Times Normal Limit (1 pt)   > 3 Times Normal Limit (2 pts)  TOTAL:    Percent Risk for Major Adverse Cardiac Event (MACE)  0-3 pts indicates low risk for MACE   2.5% (DISCHARGE)   4-7 pts indicates moderate risk for MACE  20.3% (OBS)  8-10 pts indicates high risk for MACE  72.7% (EARLY INVASIVE [place])    CDU IMPRESSION / Emily Aceves is a 46 y.o. female who presents with complaints of left side chest pain radiating to the neck that she describes as a burning sensation. Patient also complains of a burning sensation in her chest and back and states she has a history of fibromyalgia. Labs noted a slightly elevated BNP patient has no history of recent cardiac work-up. Given clinical presentation will admit for further observation and cardiac evaluation    1. Inpatient consult to cardiology-appreciate recommendations  · Symptom management  · Continue home medications and pain control  · Monitor vitals, labs, and imaging  · DISPO: pending consults and clinical improvement    CONSULTS:    IP CONSULT TO CARDIOLOGY    PROCEDURES:  Not indicated       PATIENT REFERRED TO:    No follow-up provider specified. --  My Supervising Physician for today is Dr. Jin Ortiz  We discussed and agreed upon a treatment plan  Luis Aquino, [address] [facility]   Emergency Medicine Resident     This dictation was generated by voice recognition computer software. Although all attempts are made to edit the dictation for accuracy, there may be errors in the transcription that are not intended.

## 2021-10-26 NOTE — PROGRESS NOTES
[address]  CDU / OBSERVATION ENCOUNTER  ATTENDING NOTE       I performed a history and physical examination of the patient and discussed management with the resident or midlevel provider. I reviewed the resident or midlevel provider's note and agree with the documented findings and plan of care. Any areas of disagreement are noted on the chart. I was personally present for the key portions of any procedures. I have documented in the chart those procedures where I was not present during the key portions. I have reviewed the nurses notes. I agree with the chief complaint, past medical history, past surgical history, allergies, medications, social and family history as documented unless otherwise noted below. The Family history, social history, and ROS are effectively unchanged since admission unless noted elsewhere in the chart. Complaints of shortness of breath and left-sided chest pain. Radiation to neck and described as a burning sensation. Patient with complaints of chest discomfort in a similar fashion. Patient with history of fibromyalgia and hypertension. Patient denies traumatic injuries. Patient states she has some muscular soreness associated with her job. Patient had some reproducible tenderness over the chest wall. Work-up in ED negative but heart score listed is 4. Patient had cardiac work-up in 2015 with stress test and catheterization. No subsequent cardiac testing. Patient with symptoms consistent with muscular type pain but stress test ordered to further restratify. Disposition per stress testing.   Chon Rowley MD  Attending Emergency  Physician

## 2021-10-26 NOTE — PLAN OF CARE
Problem: Pain:  Goal: Pain level will decrease  Description: Pain level will decrease  Outcome: Ongoing  Goal: Control of acute pain  Description: Control of acute pain  Outcome: Ongoing  Goal: Control of chronic pain  Description: Control of chronic pain  Outcome: Ongoing  Goal: Patient's pain/discomfort is manageable  Description: Patient's pain/discomfort is manageable  Outcome: Ongoing     Problem: Infection:  Goal: Will remain free from infection  Description: Will remain free from infection  Outcome: Ongoing     Problem: Safety:  Goal: Free from accidental physical injury  Description: Free from accidental physical injury  Outcome: Ongoing  Goal: Free from intentional harm  Description: Free from intentional harm  Outcome: Ongoing     Problem: Daily Care:  Goal: Daily care needs are met  Description: Daily care needs are met  Outcome: Ongoing     Problem: Discharge Planning:  Goal: Patients continuum of care needs are met  Description: Patients continuum of care needs are met  Outcome: Ongoing

## 2021-10-26 NOTE — PROGRESS NOTES
OBS/CDU   RESIDENT NOTE      Patients PCP is: No primary care provider on file. SUBJECTIVE      No acute events overnight. Patient endorsing resolution of her symptoms. Discussed with patient negative work-up in the ED. Will get stress test today. Patient understands and agrees with plan. Denies fever, chills, nausea, vomiting, chest pain, shortness of breath, abdominal pain, focal weakness, numbness, tingling, urinary/bowel symptoms, vision changes, visual hallucinations, or headache. PHYSICAL EXAM      General: NAD, AO X 3  Heent: EMOI, PERRL  Neck: SUPPLE, NO JVD  Cardiovascular: RRR, S1S2  Pulmonary: CTAB, NO SOB  Abdomen: SOFT, NTTP, ND, +BS  Extremities: +2/4 PULSES DISTAL, NO SWELLING  Neuro / Psych: NO NUMBNESS OR TINGLING, MENTATION AT BASELINE    PERTINENT TEST /EXAMS      I have reviewed all available laboratory results. MEDICATIONS CURRENT   acetaminophen (TYLENOL) tablet 650 mg, Q6H PRN  lidocaine 4 % external patch 1 patch, Daily  losartan (COZAAR) tablet 100 mg, Daily  pravastatin (PRAVACHOL) tablet 20 mg, Daily  sodium chloride flush 0.9 % injection 5-40 mL, 2 times per day  sodium chloride flush 0.9 % injection 5-40 mL, PRN  0.9 % sodium chloride infusion, PRN  enoxaparin (LOVENOX) injection 40 mg, Daily  ondansetron (ZOFRAN-ODT) disintegrating tablet 4 mg, Q8H PRN   Or  ondansetron (ZOFRAN) injection 4 mg, Q6H PRN  diphenhydrAMINE (BENADRYL) tablet 25 mg, Q6H PRN  metoprolol tartrate (LOPRESSOR) tablet 50 mg, Nightly  metoprolol tartrate (LOPRESSOR) tablet 100 mg, QAM        All medication charted and reviewed. CONSULTS      None    ASSESSMENT/PLAN       Thaddeus Bamberger is a 46 y.o. female who presents with left-sided chest pain with associated burning radiation to the neck, back. History of fibromyalgia.     · Chest pain  · Stress test today 10/26/2021  · Troponin x2 neg  · Elevated BNP of 467  · EKG nonconcerning for ischemia  · Last stress test was 7/24/2015 low risk  · Cardiac catheterization on 7/14/2015 showing normal coronaries and normal LV function  · Continue home medications and pain control  · Monitor vitals, labs, and imaging  · DISPO: pending consults and clinical improvement    --  Jesús Rodriguez DO  Emergency Medicine Resident Physician     This dictation was generated by voice recognition computer software. Although all attempts are made to edit the dictation for accuracy, there may be errors in the transcription that are not intended.

## 2021-10-26 NOTE — PROCEDURES
[address]                  [facility] [address] [address]                              CARDIAC STRESS TEST    PATIENT NAME: Rebekah Gonzáles                      :        1970  MED REC NO:   [id number]                             ROOM:       [facility]  ACCOUNT NO:   [de-identified]                           ADMIT DATE: 10/25/2021  PROVIDER:     Brenton Washington MD    DATE OF STUDY:  10/26/2021    ORDERING PROVIDER: Dr. Ulysses Gay: Dr. Pryor Fuelling:  Indication: Chest pain  Procedure explained and consent signed. Medications: Lexiscan, 0.4 mg  Resting heart rate: 56 bpm  Resting blood pressure:  133/76 mm/Hg  Infusion heart rate:  81 bpm  Infusion blood pressure:  166/90 mm/Hg  Resting EKG: Normal, Sinus bradycardia  Stress heart response: Normal Response  Stress BP response: Appropriate  Stress EKG(s): Normal, Premature ventricular complex  Chest discomfort: 7/10 chest tightness during test  Ischemic EKG changes: None    IMPRESSION:  Electrocardiographically Negative Lexiscan stress study. Radio-isotope  results to follow from the department of Nuclear Medicine.           Merline Howard MD    D: 10/26/2021 12:56:24       T: 10/26/2021 12:57:20     /[id number]  Job#: [id number]     Doc#: Unknown    CC:

## 2021-10-26 NOTE — ED NOTES
Pt transported to [facility], report called, questions answered     Michael Bar RN  10/25/21 2056

## 2021-10-26 NOTE — DISCHARGE SUMMARY
CDU Discharge Summary        Patient:  Thaddeus Bamberger  YOB: 1970    MRN: [id number]   Acct: [de-identified]    Primary Care Physician: No primary care provider on file. Admit date:  10/25/2021  5:26 PM  Discharge date: 10/26/2021    Discharge Diagnoses:     Acute chest pain due to unknown etiology  Improved with rest, pain management, symptom control    Follow-up:  Call today/tomorrow for a follow up appointment with No primary care provider on file. , or return to the Emergency Room with worsening symptoms    Stressed to patient the importance of following up with primary care doctor for further workup/management of symptoms. Pt verbalizes understanding and agrees with plan. Discharge Medications:  Changes to medications         Perlita Johnson   Home Medication Instructions [facility]:[id number]    Printed on:10/26/21 1531   Medication Information                      AMITRIPTYLINE HCL PO  Take by mouth             benzocaine-menthol (CEPACOL SORE THROAT) 15-3.6 MG lozenge  Take 1 lozenge by mouth every 2 hours as needed for Sore Throat             citalopram (CELEXA) 20 MG tablet  Take 40 mg by mouth daily.              cyclobenzaprine (FLEXERIL) 10 MG tablet  Take 1 tablet by mouth 3 times daily as needed for Muscle spasms             diclofenac sodium 1 % GEL  Apply 2 g topically 2 times daily             dicyclomine (BENTYL) 10 MG capsule  Take 1 capsule by mouth every 6 hours as needed (cramps)             divalproex (DEPAKOTE ER) 500 MG ER tablet  Take 1 tablet by mouth nightly             divalproex (DEPAKOTE) 500 MG DR tablet  Take 1 tablet by mouth daily             Elastic Bandages & Supports (KNEE BRACE ADJUSTABLE HINGED) MISC  1 Device by Does not apply route as needed (comfort)             Elastic Bandages & Supports (TENNIS ELBOW SUPPORT) MISC  1 Device by Does not apply route as needed             EPINEPHrine (EPIPEN) 0.3 MG/0.3ML SOAJ injection  Inject 0.3 mg into the muscle as needed Use as directed for allergic reaction             indomethacin (INDOCIN) 50 MG capsule  Take 1 capsule by mouth 3 times daily (with meals). lidocaine (LIDODERM) 5 %  Place 1 patch onto the skin daily 12 hours on, 12 hours off.             losartan (COZAAR) 50 MG tablet  Take 100 mg by mouth daily. metoprolol tartrate (LOPRESSOR) 50 MG tablet  2 IN AM AND 1 AT NIGHT             Misc. Devices (WRIST BRACE) [facility]  1 Device by Does not apply route daily One brace to be worn at night and daily as needed for carpal tunnel syndrome. Brace should place the wrist in neutral.             ondansetron (ZOFRAN) 4 MG tablet  Take 1 tablet by mouth every 8 hours as needed for Nausea. pravastatin (PRAVACHOL) 20 MG tablet  Take 20 mg by mouth daily. predniSONE (DELTASONE) 20 MG tablet  Take 1 tablet by mouth daily for 5 days                 Diet:  Diet NPO  ADULT DIET; Regular , Advance as tolerated     Activity:  As tolerated    Consultants: None    Procedures: Stress test    Diagnostic Test:   Results for orders placed or performed during the hospital encounter of 10/25/21   COVID-19, Rapid    Specimen: Nasopharyngeal Swab   Result Value Ref Range    Specimen Description . NASOPHARYNGEAL SWAB     SARS-CoV-2, Rapid Not Detected Not Detected   CBC Auto Differential   Result Value Ref Range    WBC 5.7 3.5 - 11.3 k/uL    RBC 5.31 (H) 3.95 - 5.11 m/uL    Hemoglobin 11.7 (L) 11.9 - 15.1 g/dL    Hematocrit 38.6 36.3 - 47.1 %    MCV 72.7 (L) 82.6 - 102.9 fL    MCH 22.0 (L) 25.2 - 33.5 pg    MCHC 30.3 28.4 - 34.8 g/dL    RDW 14.8 (H) 11.8 - 14.4 %    Platelets See Reflexed IPF Result 138 - 453 k/uL    MPV NOT REPORTED 8.1 - 13.5 fL    NRBC Automated 0.0 0.0 per 100 WBC    Differential Type NOT REPORTED     WBC Morphology NOT REPORTED     RBC Morphology NOT REPORTED     Platelet Estimate NOT REPORTED     Immature Granulocytes 0 0 %    Seg Neutrophils 52 36 - 66 %    Lymphocytes 41 24 - 44 % CONTRAST 10/25/2021 6:42 pm TECHNIQUE: CTA of the chest was performed before and after the administration of intravenous contrast.  Multiplanar reformatted images are provided for review. MIP images are provided for review. Dose modulation, iterative reconstruction, and/or weight based adjustment of the mA/kV was utilized to reduce the radiation dose to as low as reasonably achievable. COMPARISON: None. HISTORY: ORDERING SYSTEM PROVIDED HISTORY: eval dissection TECHNOLOGIST PROVIDED HISTORY: eval dissection Reason for Exam: eval for dissection Acuity: Unknown Type of Exam: Unknown FINDINGS: Vascular: The thoracic aorta and proximal great vessels are patent and of normal caliber, without evidence of aneurysm, stenosis, or acute abnormality. No mediastinal hematoma. The central pulmonary arteries are patent and free of embolism as are the segmental and visualized subsegmental pulmonary artery branch vessels. Mediastinum: The heart size is normal.  No pericardial effusion. No enlarged or suspicious axillary, hilar, or mediastinal lymphadenopathy. Lungs/pleura: The trachea and mainstem bronchi are widely patent. The lungs are clear. No discrete pulmonary nodule or mass. No pleural effusion or pneumothorax. Soft Tissues/Bones: Degenerative changes are present throughout the thoracic spine. No acute fracture. No suspicious bone lesions. No chest wall soft tissue abnormality. Small hiatal hernia. The visualized upper abdomen is otherwise unremarkable. The gallbladder is surgically absent. Unremarkable chest CT angiogram study. No evidence of aortic dissection or aneurysm. No acute aortic abnormality. No central pulmonary embolism. Clear lungs. Normal heart size. No pleural or pericardial effusion.      XR CHEST PORTABLE    Result Date: 10/25/2021  EXAMINATION: ONE XRAY VIEW OF THE CHEST 10/25/2021 5:50 pm COMPARISON: PA and lateral chest from 09/28/2016 HISTORY: ORDERING SYSTEM PROVIDED HISTORY: Chest pain TECHNOLOGIST PROVIDED HISTORY: Chest pain Reason for Exam: upr Acuity: Unknown Type of Exam: Unknown FINDINGS: Overlying ECG monitor leads brassiere related metal. Prominent but unchanged cardiac silhouette. Mediastinal structures midline and stable. Slightly more prominent interstitial markings, greater at the bases without localized pulmonary opacity or blunting of the costophrenic angles. Slight basilar atelectasis. Some cephalization of blood flow but no Kerley lines. Bones and soft tissues stable. Stable mild cardiomegaly; slightly greater prominence interstitial markings but no clear cut pulmonary edema. No consolidation or pleural effusion. NM Cardiac Stress Test Nuclear Imaging    Result Date: 10/26/2021  EXAMINATION: MYOCARDIAL PERFUSION IMAGING 10/26/2021 9:35 am TECHNIQUE: For the rest study, 10.8 mCi of Tc 99 labeled sestamibi were injected. SPECT images were acquired. Under cardiology supervision, 0.4mg Wilbur Jt was infused. After pharmacologic stress, 36.3 mCi of Tc 99 labeled sestamibi were injected. SPECT images with ECG gating were acquired. COMPARISON: 13 July 2015 HISTORY: ORDERING SYSTEM PROVIDED HISTORY: Chest pain TECHNOLOGIST PROVIDED HISTORY: Reason for Exam: Chest pain Procedure Type->Rx chest pain Is the patient pregnant?->No Reason for Exam: chest pain radiates to neck , lightheaded, dizzy, short of breath, nausea, Right arm numbness, sweating, HTN, borderline DM, asthma, family history of heart disease, FINDINGS: Images interpreted utilizing [email or web address] system and [employer]. There is normal distribution of radiotracer throughout the myocardium without evidence for a significant reversible or fixed perfusion defect. Perfusion scores are visually adjusted to account for artifact.  Summed stress score:  0 Summed rest score:  0 Summed reversibility score:  0 Function: End diastolic volume:  938US Left ventricular ejection fraction:  60% Wall motion abnormalities:  No significant wall motion abnormalities. TID score:  1.17 (scores greater than 1.39 are considered elevated for Lexiscan stress with Tc99m)     Perfusion:  Normal Function:  No significant wall motion abnormalities. Left ventricular ejection fraction of 60%. Risk stratification: LOW Notes concerning risk stratification: Risk stratification incorporates both clinical history and some testing results. Final risk determination is the responsibility of the ordering provider as other patient information and test results may increase or decrease the risk assessment reported for this examination. Risk stratification criteria are adapted from \"Noninvasive Risk Stratification\" criteria from [place] [employer]. Al, ACC/AATS/AHA/ASE/ASNC/SCAI/SCCT/STS 2017 Appropriate Use Criteria For Coronary Revascularization in Patients With Stable Ischemic Heart Disease [facility] Volume 69, Issue 17, May 2017 High risk (>3% annual death or MI) 1. Severe resting LV dysfunction (LVEF <35%) not readily explained by non coronary causes 2. Resting perfusion abnormalities greater than 10% of the myocardium in patients without prior history or evidence of MI3. Stress-induced perfusion abnormalities encumbering greater than or equal to 10% myocardium or stress segmental scores indicating multiple vascular territories with abnormalities 4. Stress-induced LV dilatation (TID ratio greater than 1.19 for exercise and greater than 1.39 for regadenoson) Intermediate risk (1% to 3% annual death or MI) 1. Mild/moderate resting LV dysfunction (LVEF 35% to 49%) not readily explained by non coronary causes. 2. Resting perfusion abnormalities in 5%-9.9% of the myocardium in patients without a history or prior evidence of MI 3. Stress-induced perfusion abnormality encumbering 5%-9.9% of the myocardium or stress segmental scores indicating 1 vascular territory with abnormalities but without LV dilation 4.  Small wall motion abnormality involving 1-2 segments and only 1 coronary bed. Low Risk (Less than 1% annual death or MI) 1. Normal or small myocardial perfusion defect at rest or with stress encumbering less than 5% of the myocardium. Physical Exam:    General appearance - NAD, AOx 3  Lungs -CTAB, no R/R/R  Heart - RRR, no M/R/G  Abdomen - Soft, NT/ND  Neurological:  MAEx4, No focal motor deficit, sensory loss  Extremities - Cap refil <2 sec in all ext., no edema  Skin -warm, dry      Hospital Course:  Clinical course has improved, labs and imaging reviewed. Leonid Galvin originally presented to the hospital on 10/25/2021  5:26 PM. with chest pain. At that time it was determined that She required further observation and stress test, pain control. She was admitted and labs and imaging were followed daily. Imaging results as above. She is medically stable to be discharged. Disposition: Home    Patient stated that they will not drive themselves home from the hospital if they have gotten pain killers/ narcotics earlier that day and that they will arrange for transportation on their own or work with the  for a ride. Patient counseled NOT to drive while under the influence of narcotics/ pain killers. Condition: Good    Patient stable and ready for discharge home. I have discussed plan of care with patient and they are in understanding. They were instructed to read discharge paperwork. All of their questions and concerns were addressed. Time Spent: 0 day      --  Emmanuel Steele,   Emergency Medicine Resident Physician    This dictation was generated by voice recognition computer software. Although all attempts are made to edit the dictation for accuracy, there may be errors in the transcription that are not intended.

## 2021-10-26 NOTE — CARE COORDINATION
Case Management Initial Discharge Plan  Jael             Met with:patient to discuss discharge plans. Information verified: address, contacts, phone number, , insurance Yes  Insurance Provider: None - HELP is notified    Emergency Contact/Next of Kin name & number: vianney Olsoner [phone]  Who are involved in patient's support system? Family and friends     PCP: No primary care provider on file. Date of last visit: clinic list is given       Discharge Planning    Living Arrangements:        Home has 1 stories  0 stairs to climb to get into front door, na stairs to climb to reach second floor  Location of bedroom/bathroom in home main level     Patient able to perform ADL's:Independent    Current Services (outpatient & in home) none   DME equipment: na   DME provider: na     Is patient receiving oral anticoagulation therapy? No    If indicated:   Physician managing anticoagulation treatment: na   Where does patient obtain lab work for ATC treatment? na      Potential Assistance Needed:       Patient agreeable to home care: No  Converse of choice provided:  n/a    Prior SNF/Rehab Placement and Facility: none   Agreeable to SNF/Rehab: No  Converse of choice provided: n/a     Evaluation: no    Expected Discharge date:       Patient expects to be discharged to: If home: is the family and/or caregiver wiling & able to provide support at home? Yes   Who will be providing this support? Family members     Follow Up Appointment: Best Day/ Time:      Transportation provider: family   Transportation arrangements needed for discharge: No    Readmission Risk              Risk of Unplanned Readmission:  0             Does patient have a readmission risk score greater than 14?: No  If yes, follow-up appointment must be made within 7 days of discharge. Goals of Care:  To feel better       Educated patient  on transitional options, provided freedom of choice and are agreeable with plan      Discharge Plan: Home independently           Electronically signed by Hernesto Cardona RN on 10/26/21 at 1:51 PM EDT

## 2021-10-27 LAB
EKG ATRIAL RATE: 79 BPM
EKG P AXIS: 42 DEGREES
EKG P-R INTERVAL: 160 MS
EKG Q-T INTERVAL: 398 MS
EKG QRS DURATION: 76 MS
EKG QTC CALCULATION (BAZETT): 456 MS
EKG R AXIS: 49 DEGREES
EKG T AXIS: 40 DEGREES
EKG VENTRICULAR RATE: 79 BPM

## 2021-10-27 PROCEDURE — 93010 ELECTROCARDIOGRAM REPORT: CPT | Performed by: INTERNAL MEDICINE

## 2022-04-27 ENCOUNTER — APPOINTMENT (OUTPATIENT)
Dept: CT IMAGING | Age: 52
DRG: 101 | End: 2022-04-27

## 2022-04-27 ENCOUNTER — APPOINTMENT (OUTPATIENT)
Dept: GENERAL RADIOLOGY | Age: 52
DRG: 101 | End: 2022-04-27

## 2022-04-27 ENCOUNTER — HOSPITAL ENCOUNTER (INPATIENT)
Age: 52
LOS: 3 days | Discharge: HOME | DRG: 101 | End: 2022-04-30
Attending: EMERGENCY MEDICINE | Admitting: PSYCHIATRY & NEUROLOGY

## 2022-04-27 DIAGNOSIS — M79.672 LEFT FOOT PAIN: ICD-10-CM

## 2022-04-27 DIAGNOSIS — G56.03 BILATERAL CARPAL TUNNEL SYNDROME: Primary | ICD-10-CM

## 2022-04-27 DIAGNOSIS — H66.90 EAR INFECTION: ICD-10-CM

## 2022-04-27 DIAGNOSIS — R56.9 SEIZURE (HCC): ICD-10-CM

## 2022-04-27 LAB
ALBUMIN SERPL-MCNC: 4 G/DL (ref 3.5–5.2)
ALBUMIN/GLOBULIN RATIO: 1.1 (ref 1–2.5)
ALP BLD-CCNC: 69 U/L (ref 35–104)
ALT SERPL-CCNC: 11 U/L (ref 5–33)
ANION GAP SERPL CALCULATED.3IONS-SCNC: 12 MMOL/L (ref 9–17)
AST SERPL-CCNC: 17 U/L
BILIRUB SERPL-MCNC: 0.27 MG/DL (ref 0.3–1.2)
BILIRUBIN URINE: NEGATIVE
BUN BLDV-MCNC: 7 MG/DL (ref 6–20)
CALCIUM SERPL-MCNC: 9.1 MG/DL (ref 8.6–10.4)
CHLORIDE BLD-SCNC: 101 MMOL/L (ref 98–107)
CO2: 22 MMOL/L (ref 20–31)
COLOR: YELLOW
COMMENT UA: ABNORMAL
CREAT SERPL-MCNC: 0.64 MG/DL (ref 0.5–0.9)
GFR AFRICAN AMERICAN: >60 ML/MIN
GFR NON-AFRICAN AMERICAN: >60 ML/MIN
GFR SERPL CREATININE-BSD FRML MDRD: ABNORMAL ML/MIN/{1.73_M2}
GLUCOSE BLD-MCNC: 85 MG/DL (ref 65–105)
GLUCOSE BLD-MCNC: 91 MG/DL (ref 65–105)
GLUCOSE BLD-MCNC: 96 MG/DL (ref 70–99)
GLUCOSE URINE: NEGATIVE
HCT VFR BLD CALC: 41.5 % (ref 36.3–47.1)
HEMOGLOBIN: 12.2 G/DL (ref 11.9–15.1)
KETONES, URINE: NEGATIVE
LEUKOCYTE ESTERASE, URINE: NEGATIVE
MCH RBC QN AUTO: 22.8 PG (ref 25.2–33.5)
MCHC RBC AUTO-ENTMCNC: 29.4 G/DL (ref 28.4–34.8)
MCV RBC AUTO: 77.6 FL (ref 82.6–102.9)
NITRITE, URINE: NEGATIVE
NRBC AUTOMATED: 0 PER 100 WBC
PDW BLD-RTO: 14.9 % (ref 11.8–14.4)
PH UA: 7.5 (ref 5–8)
PLATELET # BLD: 217 K/UL (ref 138–453)
PMV BLD AUTO: 11.5 FL (ref 8.1–13.5)
POTASSIUM SERPL-SCNC: 3.6 MMOL/L (ref 3.7–5.3)
PROTEIN UA: NEGATIVE
RBC # BLD: 5.35 M/UL (ref 3.95–5.11)
SODIUM BLD-SCNC: 135 MMOL/L (ref 135–144)
SPECIFIC GRAVITY UA: 1 (ref 1–1.03)
TOTAL PROTEIN: 7.5 G/DL (ref 6.4–8.3)
TURBIDITY: CLEAR
URINE HGB: NEGATIVE
UROBILINOGEN, URINE: NORMAL
WBC # BLD: 5.5 K/UL (ref 3.5–11.3)

## 2022-04-27 PROCEDURE — 6360000002 HC RX W HCPCS: Performed by: GENERAL PRACTICE

## 2022-04-27 PROCEDURE — 99285 EMERGENCY DEPT VISIT HI MDM: CPT

## 2022-04-27 PROCEDURE — 6360000002 HC RX W HCPCS: Performed by: STUDENT IN AN ORGANIZED HEALTH CARE EDUCATION/TRAINING PROGRAM

## 2022-04-27 PROCEDURE — 81003 URINALYSIS AUTO W/O SCOPE: CPT

## 2022-04-27 PROCEDURE — 73630 X-RAY EXAM OF FOOT: CPT

## 2022-04-27 PROCEDURE — 85027 COMPLETE CBC AUTOMATED: CPT

## 2022-04-27 PROCEDURE — 96374 THER/PROPH/DIAG INJ IV PUSH: CPT

## 2022-04-27 PROCEDURE — 70450 CT HEAD/BRAIN W/O DYE: CPT

## 2022-04-27 PROCEDURE — 6360000002 HC RX W HCPCS

## 2022-04-27 PROCEDURE — 96375 TX/PRO/DX INJ NEW DRUG ADDON: CPT

## 2022-04-27 PROCEDURE — 82947 ASSAY GLUCOSE BLOOD QUANT: CPT

## 2022-04-27 PROCEDURE — 80053 COMPREHEN METABOLIC PANEL: CPT

## 2022-04-27 PROCEDURE — 2500000003 HC RX 250 WO HCPCS: Performed by: GENERAL PRACTICE

## 2022-04-27 PROCEDURE — 93005 ELECTROCARDIOGRAM TRACING: CPT | Performed by: GENERAL PRACTICE

## 2022-04-27 PROCEDURE — 2060000000 HC ICU INTERMEDIATE R&B

## 2022-04-27 PROCEDURE — 6370000000 HC RX 637 (ALT 250 FOR IP): Performed by: STUDENT IN AN ORGANIZED HEALTH CARE EDUCATION/TRAINING PROGRAM

## 2022-04-27 PROCEDURE — 71045 X-RAY EXAM CHEST 1 VIEW: CPT

## 2022-04-27 RX ORDER — LORAZEPAM 2 MG/ML
INJECTION INTRAMUSCULAR
Status: COMPLETED
Start: 2022-04-27 | End: 2022-04-27

## 2022-04-27 RX ORDER — SODIUM CHLORIDE 0.9 % (FLUSH) 0.9 %
5-40 SYRINGE (ML) INJECTION EVERY 12 HOURS SCHEDULED
Status: DISCONTINUED | OUTPATIENT
Start: 2022-04-27 | End: 2022-04-30 | Stop reason: HOSPADM

## 2022-04-27 RX ORDER — PRAVASTATIN SODIUM 20 MG
20 TABLET ORAL DAILY
Status: DISCONTINUED | OUTPATIENT
Start: 2022-04-28 | End: 2022-04-30 | Stop reason: HOSPADM

## 2022-04-27 RX ORDER — ENOXAPARIN SODIUM 100 MG/ML
30 INJECTION SUBCUTANEOUS 2 TIMES DAILY
Status: DISCONTINUED | OUTPATIENT
Start: 2022-04-27 | End: 2022-04-30 | Stop reason: HOSPADM

## 2022-04-27 RX ORDER — LORAZEPAM 2 MG/ML
4 INJECTION INTRAMUSCULAR ONCE
Status: COMPLETED | OUTPATIENT
Start: 2022-04-27 | End: 2022-04-27

## 2022-04-27 RX ORDER — METOPROLOL TARTRATE 50 MG/1
25 TABLET, FILM COATED ORAL 2 TIMES DAILY
Status: DISCONTINUED | OUTPATIENT
Start: 2022-04-27 | End: 2022-04-30 | Stop reason: HOSPADM

## 2022-04-27 RX ORDER — CITALOPRAM 20 MG/1
40 TABLET ORAL DAILY
Status: DISCONTINUED | OUTPATIENT
Start: 2022-04-28 | End: 2022-04-30 | Stop reason: HOSPADM

## 2022-04-27 RX ORDER — SODIUM CHLORIDE 9 MG/ML
INJECTION, SOLUTION INTRAVENOUS PRN
Status: DISCONTINUED | OUTPATIENT
Start: 2022-04-27 | End: 2022-04-30 | Stop reason: HOSPADM

## 2022-04-27 RX ORDER — POLYETHYLENE GLYCOL 3350 17 G/17G
17 POWDER, FOR SOLUTION ORAL DAILY PRN
Status: DISCONTINUED | OUTPATIENT
Start: 2022-04-27 | End: 2022-04-30 | Stop reason: HOSPADM

## 2022-04-27 RX ORDER — LOSARTAN POTASSIUM 50 MG/1
100 TABLET ORAL DAILY
Status: DISCONTINUED | OUTPATIENT
Start: 2022-04-28 | End: 2022-04-30 | Stop reason: HOSPADM

## 2022-04-27 RX ORDER — ONDANSETRON 2 MG/ML
4 INJECTION INTRAMUSCULAR; INTRAVENOUS EVERY 6 HOURS PRN
Status: DISCONTINUED | OUTPATIENT
Start: 2022-04-27 | End: 2022-04-30 | Stop reason: HOSPADM

## 2022-04-27 RX ORDER — CLINDAMYCIN PHOSPHATE 600 MG/50ML
600 INJECTION INTRAVENOUS ONCE
Status: COMPLETED | OUTPATIENT
Start: 2022-04-27 | End: 2022-04-28

## 2022-04-27 RX ORDER — SODIUM CHLORIDE 0.9 % (FLUSH) 0.9 %
5-40 SYRINGE (ML) INJECTION PRN
Status: DISCONTINUED | OUTPATIENT
Start: 2022-04-27 | End: 2022-04-30 | Stop reason: HOSPADM

## 2022-04-27 RX ORDER — LEVETIRACETAM 500 MG/1
500 TABLET ORAL 2 TIMES DAILY
Status: DISCONTINUED | OUTPATIENT
Start: 2022-04-27 | End: 2022-04-28

## 2022-04-27 RX ORDER — ONDANSETRON 4 MG/1
4 TABLET, ORALLY DISINTEGRATING ORAL EVERY 8 HOURS PRN
Status: DISCONTINUED | OUTPATIENT
Start: 2022-04-27 | End: 2022-04-30 | Stop reason: HOSPADM

## 2022-04-27 RX ORDER — AMMONIA INHALANTS 0.04 G/.3ML
INHALANT RESPIRATORY (INHALATION)
Status: DISPENSED
Start: 2022-04-27 | End: 2022-04-28

## 2022-04-27 RX ORDER — LORAZEPAM 2 MG/ML
1 INJECTION INTRAMUSCULAR EVERY 5 MIN PRN
Status: DISCONTINUED | OUTPATIENT
Start: 2022-04-27 | End: 2022-04-30 | Stop reason: HOSPADM

## 2022-04-27 RX ORDER — LEVETIRACETAM 10 MG/ML
1000 INJECTION INTRAVASCULAR ONCE
Status: COMPLETED | OUTPATIENT
Start: 2022-04-27 | End: 2022-04-27

## 2022-04-27 RX ADMIN — METOPROLOL TARTRATE 25 MG: 50 TABLET, FILM COATED ORAL at 22:04

## 2022-04-27 RX ADMIN — LEVETIRACETAM 1000 MG: 10 INJECTION INTRAVENOUS at 19:00

## 2022-04-27 RX ADMIN — ENOXAPARIN SODIUM 30 MG: 100 INJECTION SUBCUTANEOUS at 22:04

## 2022-04-27 RX ADMIN — LEVETIRACETAM 1000 MG: 10 INJECTION INTRAVENOUS at 19:34

## 2022-04-27 RX ADMIN — LORAZEPAM 4 MG: 2 INJECTION INTRAMUSCULAR at 19:29

## 2022-04-27 RX ADMIN — CLINDAMYCIN PHOSPHATE 600 MG: 600 INJECTION, SOLUTION INTRAVENOUS at 23:22

## 2022-04-27 RX ADMIN — LORAZEPAM 4 MG: 2 INJECTION INTRAMUSCULAR; INTRAVENOUS at 19:29

## 2022-04-27 ASSESSMENT — ENCOUNTER SYMPTOMS
NAUSEA: 0
COUGH: 0
VOMITING: 0
SHORTNESS OF BREATH: 0

## 2022-04-27 ASSESSMENT — PAIN - FUNCTIONAL ASSESSMENT: PAIN_FUNCTIONAL_ASSESSMENT: 0-10

## 2022-04-27 ASSESSMENT — PAIN DESCRIPTION - LOCATION: LOCATION: HEAD

## 2022-04-27 ASSESSMENT — PAIN SCALES - GENERAL: PAINLEVEL_OUTOF10: 6

## 2022-04-27 NOTE — ED NOTES
Pt transported via EMS from work after an approximately 2 minute seizure witnessed by coworkers. Per EMS, pt was postictal when they arrived on scene. PTA patient again seized for EMS for approx. 2 minutes. Pt  Given 4 of versed. EMS states that patient's seizure involved mild convulsing. On arrival, pt postictal. By the end of RN triage, pt was spontaneously opening eyes, and responding to questions. Pt reports that she stutters after she has a seizure. Pt states that she hasn't had a seizure in more than 10 years and hasn't taken any medications for seizures since then.         Dory Leigh, RN  04/27/22 6837

## 2022-04-27 NOTE — ED PROVIDER NOTES
[facility] ED     Emergency Department     Faculty Attestation    I performed a history and physical examination of the patient and discussed management with the resident. I reviewed the residents note and agree with the documented findings and plan of care. Any areas of disagreement are noted on the chart. I was personally present for the key portions of any procedures. I have documented in the chart those procedures where I was not present during the key portions. I have reviewed the emergency nurses triage note. I agree with the chief complaint, past medical history, past surgical history, allergies, medications, social and family history as documented unless otherwise noted below. For Physician Assistant/ Nurse Practitioner cases/documentation I have personally evaluated this patient and have completed at least one if not all key elements of the E/M (history, physical exam, and MDM). Additional findings are as noted. Presents after she had a seizure tonight. Patient states that she had a history of seizures several years ago but has not had one in over 10 years. She says that yesterday she felt a little dizzy and similar to feelings that she has had when she used to have seizures but it seemed to pass. She says that she developed the symptoms again today and then lost consciousness. She says she next remembers being on ambulance. She denies any injuries. Patient is not on any seizure medications. On exam, patient is resting comfortably in the bed. She is alert and oriented and answering questions appropriately. However, patient does have a stutter that she says is not typical for her. Strength and sensation is intact to all extremities. Lungs are clear to auscultation bilaterally and heart sounds are normal.  Patient denies any recent fevers or illness. She denies chest pain or shortness of breath.   We will get CT scan of the head, EKG, labs and plan to speak with neurology.     EKG Interpretation    Interpreted by emergency department physician    Rhythm: normal sinus   Rate: normal  Axis: normal  Ectopy: none  Conduction: normal  ST Segments: normal  T Waves: non specific changes  Q Waves: none    Clinical Impression: non-specific EKG    MD Aysha De La Torre MD  Attending Emergency  Physician              Naseem Borja MD  04/27/22 1929

## 2022-04-27 NOTE — ED PROVIDER NOTES
[facility] ED  Emergency Department Encounter  EmergencyMedicine Resident     Pt Soledad Baker  MRN: [id number]  [place] 1970  Date of evaluation: 4/27/22  PCP:  No primary care provider on file. CHIEF COMPLAINT       Chief Complaint   Patient presents with    Seizures       HISTORY OF PRESENT ILLNESS  (Location/Symptom, Timing/Onset, Context/Setting, Quality, Duration, Modifying Factors, Severity.)      Yasmany Benson is a 45 y.o. female who presents with seizure. Patient states that she does have a history of seizure disorder however is not been on medication for last 10 years, had 2 witnessed seizures one was at work 1 was by EMS was given Versed via EMS. Patient arrived postictal, with a stutter which she states is baseline for her when she had her seizures. Denies any headache, focal neurodeficit. 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 has a past surgical history that includes Hysterectomy; Breast surgery; Tonsillectomy; Adenoidectomy; sinus surgery; Cosmetic surgery; knee surgery; and Cardiac catheterization (7-).     Social History     Socioeconomic History    Marital status: Single     Spouse name: Not on file    Number of children: Not on file    Years of education: Not on file    Highest education level: Not on file   Occupational History    Not on file   Tobacco Use    Smoking status: Never Smoker    Smokeless tobacco: Never Used   Vaping Use    Vaping Use: Never used   Substance and Sexual Activity    Alcohol use: Yes     Comment: occasionally    Drug use: Yes     Types: Marijuana Veldon Breath)    Sexual activity: Not on file   Other Topics Concern    Not on file   Social History Narrative    Not on file     Social Determinants of Health     Financial Resource Strain:     Difficulty of Paying Living Expenses: Not on file   Food Insecurity:     Worried About Running Out of Food in the Last Year: Not on file    Vinnie of Food in the Last Year: Not on file   Transportation Needs:     Lack of Transportation (Medical): Not on file    Lack of Transportation (Non-Medical): Not on file   Physical Activity:     Days of Exercise per Week: Not on file    Minutes of Exercise per Session: Not on file   Stress:     Feeling of Stress : Not on file   Social Connections:     Frequency of Communication with Friends and Family: Not on file    Frequency of Social Gatherings with Friends and Family: Not on file    Attends Synagogue Services: Not on file    Active Member of [address] [place] [address] [employer] or Organizations: Not on file    Attends Club or Organization Meetings: Not on file    Marital Status: Not on file   Intimate Partner Violence:     Fear of Current or Ex-Partner: Not on file    Emotionally Abused: Not on file    Physically Abused: Not on file    Sexually Abused: Not on file   Housing Stability:     Unable to Pay for Housing in the Last Year: Not on file    Number of [place] in the Last Year: Not on file    Unstable Housing in the Last Year: Not on file       Family History   Problem Relation Age of Onset    Cancer Mother         breast and bone    Heart Disease Father     Diabetes Sister     High Blood Pressure Sister     Diabetes Brother     High Blood Pressure Brother     Heart Disease Maternal Grandmother     Cancer Maternal Grandmother         breast    Cancer Paternal Grandmother     Heart Disease Paternal Grandfather        Allergies:  Latex, Motrin [ibuprofen], Norco [hydrocodone-acetaminophen], Tramadol, Naproxen, and Tylenol [acetaminophen]    Home Medications:  Prior to Admission medications    Medication Sig Start Date End Date Taking?  Authorizing Provider   lidocaine (LIDODERM) 5 % Place 1 patch onto the skin daily 12 hours on, 12 hours off. 11/16/19   ANNA Yates - CNP   cyclobenzaprine (FLEXERIL) 10 MG tablet Take 1 tablet by mouth 3 times daily as needed for Muscle spasms 11/15/19   Levon Aparicio MD   benzocaine-menthol (CEPACOL SORE THROAT) 15-3.6 MG lozenge Take 1 lozenge by mouth every 2 hours as needed for Sore Throat 9/18/19   [address] [place] [address] Tyree, PA-C   diclofenac sodium 1 % GEL Apply 2 g topically 2 times daily 4/20/19   Allie Barbosa, DO   Elastic Bandages & Supports (KNEE BRACE ADJUSTABLE HINGED) MISC 1 Device by Does not apply route as needed (comfort) 12/19/18   Chon Tomlin MD   AMITRIPTYLINE HCL PO Take by mouth    Historical Provider, MD   dicyclomine (BENTYL) 10 MG capsule Take 1 capsule by mouth every 6 hours as needed (cramps) 5/26/18   Leslie Ramirez, DO   Misc. Devices (WRIST BRACE) MISC 1 Device by Does not apply route daily One brace to be worn at night and daily as needed for carpal tunnel syndrome. Brace should place the wrist in neutral. 2/20/17   Azar Martin, DO   EPINEPHrine (EPIPEN) 0.3 MG/0.3ML SOAJ injection Inject 0.3 mg into the muscle as needed Use as directed for allergic reaction    Historical Provider, MD   divalproex (DEPAKOTE) 500 MG DR tablet Take 1 tablet by mouth daily 9/13/16   Summer Mcgarry MD   metoprolol tartrate (LOPRESSOR) 50 MG tablet 2 IN AM AND 1 AT NIGHT 4/26/16   Historical Provider, MD   divalproex (DEPAKOTE ER) 500 MG ER tablet Take 1 tablet by mouth nightly 5/20/16   Reed Tompkins Blood, DO   Elastic Bandages & Supports (TENNIS ELBOW SUPPORT) MISC 1 Device by Does not apply route as needed 6/2/15   Kristen Manzanares MD   pravastatin (PRAVACHOL) 20 MG tablet Take 20 mg by mouth daily. Historical Provider, MD   ondansetron (ZOFRAN) 4 MG tablet Take 1 tablet by mouth every 8 hours as needed for Nausea.  8/8/14   Christina Guaman, DO   indomethacin (INDOCIN) 50 MG capsule Take 1 capsule by mouth 3 times daily (with meals). 7/26/14   Sophie Hartmann MD   citalopram (CELEXA) 20 MG tablet Take 40 mg by mouth daily. Historical Provider, MD   losartan (COZAAR) 50 MG tablet Take 100 mg by mouth daily. Historical Provider, MD       REVIEW OF SYSTEMS    (2-9 systems for level 4, 10 or more for level 5)      Review of Systems   Constitutional: Negative for activity change, appetite change, chills and fever. HENT: Negative for congestion. Respiratory: Negative for cough and shortness of breath. Cardiovascular: Negative for chest pain. Gastrointestinal: Negative for nausea and vomiting. Chronic abdominal pain   Neurological: Positive for seizures. Stuttering   Psychiatric/Behavioral: Positive for confusion. Negative for agitation. The patient is not nervous/anxious. PHYSICAL EXAM   (up to 7 for level 4, 8 or more for level 5)      INITIAL VITALS:   /78   Pulse 77   Temp 98.3 °F (36.8 °C) (Oral)   Resp 17   Ht 5' 8\" (1.727 m)   Wt 260 lb (117.9 kg)   SpO2 98%   BMI 39.53 kg/m²     Physical Exam  Vitals reviewed. Exam conducted with a chaperone present. Constitutional:       Comments: Patient is alert, awake, is having some stuttering when talking is not acute distress not toxic appearing or ill-appearing. HENT:      Head: Normocephalic and atraumatic. Left Ear: External ear normal.      Ears:      Comments: Patient complaining of some tenderness over the right mastoid process there is no bulging no redness, there is some mild drainage noted in the ear canal, TM is intact     Mouth/Throat:      Comments: Moist, no tongue lacerations noted no exudates  Eyes:      Extraocular Movements: Extraocular movements intact. Pupils: Pupils are equal, round, and reactive to light. Cardiovascular:      Rate and Rhythm: Normal rate.    Pulmonary:      Comments: Breathing comfortable room air, symmetric chest rise, speaking full sentences, no evidence respiratory distress  Abdominal:      General: Abdomen is flat. There is no distension. Palpations: Abdomen is soft. Tenderness: There is no abdominal tenderness. There is no guarding or rebound. Musculoskeletal:      Right lower leg: No edema. Left lower leg: No edema. Neurological:      Comments: Neuro exam unremarkable NIH 0, full range of motion 5/5 strength no deficits         DIFFERENTIAL  DIAGNOSIS     PLAN (LABS / IMAGING / EKG):  Orders Placed This Encounter   Procedures    CT HEAD WO CONTRAST    XR CHEST PORTABLE    MRI brain without contrast    XR FOOT RIGHT (MIN 3 VIEWS)    CBC    Comprehensive Metabolic Panel    Urinalysis with Reflex to Culture    TOX SCR, BLD, ED    ADULT DIET; Regular    Vital signs per unit routine    Neuro checks    Swallow assessment by nursing before diet and oral medications started.     Up as tolerated    Full Code    Inpatient consult to Neurology    Initiate Oxygen Therapy Protocol    POC Glucose Fingerstick    POC Glucose Fingerstick    EKG 12 Lead    EEG awake and drowsy    ADMIT TO INPATIENT    Seizure precautions       MEDICATIONS ORDERED:  Orders Placed This Encounter   Medications    levETIRAcetam (KEPPRA) 1000 mg/100 mL IVPB    LORazepam (ATIVAN) 2 MG/ML injection     Bianca Anaya: cabinet override    LORazepam (ATIVAN) injection 4 mg    levETIRAcetam (KEPPRA) 1000 mg/100 mL IVPB    citalopram (CELEXA) tablet 40 mg    losartan (COZAAR) tablet 100 mg    metoprolol tartrate (LOPRESSOR) tablet 25 mg    pravastatin (PRAVACHOL) tablet 20 mg    sodium chloride flush 0.9 % injection 5-40 mL    sodium chloride flush 0.9 % injection 5-40 mL    0.9 % sodium chloride infusion    LORazepam (ATIVAN) injection 1 mg    enoxaparin Sodium (LOVENOX) injection 30 mg     Order Specific Question:   Indication of Use     Answer:   Prophylaxis-DVT/PE    OR Linked Order Group     ondansetron (ZOFRAN-ODT) disintegrating tablet 4 mg     ondansetron (ZOFRAN) injection 4 mg    polyethylene glycol (GLYCOLAX) packet 17 g    levETIRAcetam (KEPPRA) tablet 500 mg    ammonia inhaler     Wen Cornelius: cabinet override    clindamycin (CLEOCIN) 600 mg in dextrose 5 % 50 mL IVPB     Order Specific Question:   Antimicrobial Indications     Answer:    Other     Order Specific Question:   Other Abx Indication     Answer:   Mastoiditis       DDX: Breakthrough seizure, electro abnormality, intracranial abnormality, low suspicion for meningitis    DIAGNOSTIC RESULTS / EMERGENCY DEPARTMENT COURSE / MDM   :  Results for orders placed or performed during the hospital encounter of 04/27/22   CBC   Result Value Ref Range    WBC 5.5 3.5 - 11.3 k/uL    RBC 5.35 (H) 3.95 - 5.11 m/uL    Hemoglobin 12.2 11.9 - 15.1 g/dL    Hematocrit 41.5 36.3 - 47.1 %    MCV 77.6 (L) 82.6 - 102.9 fL    MCH 22.8 (L) 25.2 - 33.5 pg    MCHC 29.4 28.4 - 34.8 g/dL    RDW 14.9 (H) 11.8 - 14.4 %    Platelets 608 826 - 392 k/uL    MPV 11.5 8.1 - 13.5 fL    NRBC Automated 0.0 0.0 per 100 WBC   Comprehensive Metabolic Panel   Result Value Ref Range    Glucose 96 70 - 99 mg/dL    BUN 7 6 - 20 mg/dL    CREATININE 0.64 0.50 - 0.90 mg/dL    Calcium 9.1 8.6 - 10.4 mg/dL    Sodium 135 135 - 144 mmol/L    Potassium 3.6 (L) 3.7 - 5.3 mmol/L    Chloride 101 98 - 107 mmol/L    CO2 22 20 - 31 mmol/L    Anion Gap 12 9 - 17 mmol/L    Alkaline Phosphatase 69 35 - 104 U/L    ALT 11 5 - 33 U/L    AST 17 <32 U/L    Total Bilirubin 0.27 (L) 0.3 - 1.2 mg/dL    Total Protein 7.5 6.4 - 8.3 g/dL    Albumin 4.0 3.5 - 5.2 g/dL    Albumin/Globulin Ratio 1.1 1.0 - 2.5    GFR Non-African American >60 >60 mL/min    GFR African American >60 >60 mL/min    GFR Comment         Urinalysis with Reflex to Culture    Specimen: Urine   Result Value Ref Range    Color, UA Yellow Yellow    Turbidity UA Clear Clear    Glucose, Ur NEGATIVE NEGATIVE    Bilirubin Urine NEGATIVE NEGATIVE    Ketones, Urine NEGATIVE NEGATIVE    Specific Gravity, UA 1.004 (L) 1.005 - 1.030    Urine Hgb NEGATIVE NEGATIVE    pH, UA 7.5 5.0 - 8.0    Protein, UA NEGATIVE NEGATIVE    Urobilinogen, Urine Normal Normal    Nitrite, Urine NEGATIVE NEGATIVE    Leukocyte Esterase, Urine NEGATIVE NEGATIVE    Urinalysis Comments       Microscopic exam not performed based on chemical results unless requested in original order. POC Glucose Fingerstick   Result Value Ref Range    POC Glucose 91 65 - 105 mg/dL   POC Glucose Fingerstick   Result Value Ref Range    POC Glucose 85 65 - 105 mg/dL           RADIOLOGY:   CT HEAD WO CONTRAST   Performed: 04/27/22 2003  Final          Impression: No acute intracranial abnormality. No acute territorial infarction, intracranial hemorrhage or mass lesion. Mild mucosal thickening of the maxillary sinuses and ethmoidal air cells. Severe opacificati. ..      04/27/22 1941  XR CHEST PORTABLE   Performed: 04/27/22 1927  Final         Impression: Mild bilateral interstitial infiltrates or edema             EKG  None    All EKG's are interpreted by the Emergency Department Physician who either signs or Co-signs this chart in the absence of a cardiologist.    EMERGENCY DEPARTMENT COURSE/IMPRESSION: 57-year-old female present emergency department with concern for seizure. Patient had 2 seizures prior to arrival which were witnessed, lasting less than a minute, patient glucose normal, patient arrived she was alert, did have some stuttering which she states happened 10 years ago when she had seizure disorder, patient did have seizure-like activity in the emergency department for being here for approximately 15 minutes, patient was rhythmically moving her torso back-and-forth with her eyes closed, was given 4 mg of Ativan, when patient's hand was held over head and let go she did move from hitting her face as Ativan was given, seizure immediately stopped. Consults neurology evaluated patient would like to admit patient for further evaluation. Patient after admitted started complaining of double vision neurology was notified at bedside to reevaluate. CT head shows otomastoiditis, initially denied any ear pain however she did have episode where her family states that she was unresponsive, patient responding to noxious stimuli, when it became more alert she was complaining of right ear pain and right foot pain we will add x-ray and start antibiotics. Patient patient may need ENT consult while admitted. Patient states that she does work for a [employer] but denies being on any chemicals today. ED Course as of 04/27/22 2321 Wed Apr 27, 2022   [address] [place] [address] [facility] me to bedside to the patient is having seizure-like activity, patient was rhythmically moving her torso around, was satting 90%, heart rate was 90, sooner she was given 4 mg of Ativan and the shaking stopped, patient appeared postictal.  Had throat reaction, moved hand from face. [WG]   1937 Patient is now on the telephone. Neurology is at bedside [WG]      ED Course User Index  [WG] Sinai Cordova DO     PROCEDURES:  None    CONSULTS:  IP CONSULT TO NEUROLOGY    CRITICAL CARE:  CRITICAL CARE: There was a high probability of clinically significant/life threatening deterioration in this patient's condition which required my urgent intervention. Total critical care time was 30 minutes. This excludes any time for separately reportable procedures. FINAL IMPRESSION      1. Seizure ([address]    2. Ear infection    3. Right foot pain          DISPOSITION / PLAN     DISPOSITION Admitted 04/27/2022 07:51:22 PM      PATIENT REFERRED TO:  No follow-up provider specified.     DISCHARGE MEDICATIONS:  New Prescriptions    No medications on file       Sinai Cordova DO  Emergency Medicine Resident    (Please note that portions of thisnote were completed with a voice recognition program.  Efforts were made to edit the dictations but occasionally words are mis-transcribed.) Gina Khan, DO  Resident  04/27/22 520 [facility], DO  Resident  04/27/22 Martin, DO  Resident  04/27/22 5945

## 2022-04-27 NOTE — LETTER
Tamara Presley  [address] [facility] [address]  Phone: [phone]    No name on file. April 30, 2022     Patient: Chandan Muñoz   YOB: 1970   Date of Visit: 4/27/2022       To Whom It May Concern: It is my medical opinion that Fidelia Going should remain out of work till 5/    If you have any questions or concerns, please don't hesitate to call.     Sincerely,        [address] [facility] [address]

## 2022-04-28 ENCOUNTER — APPOINTMENT (OUTPATIENT)
Dept: MRI IMAGING | Age: 52
DRG: 101 | End: 2022-04-28

## 2022-04-28 LAB
ACETAMINOPHEN LEVEL: <5 UG/ML (ref 10–30)
CHOLESTEROL/HDL RATIO: 3.5
CHOLESTEROL: 195 MG/DL
ESTIMATED AVERAGE GLUCOSE: 103 MG/DL
ETHANOL PERCENT: <0.01 %
ETHANOL: <10 MG/DL
HBA1C MFR BLD: 5.2 % (ref 4–6)
HDLC SERPL-MCNC: 56 MG/DL
LDL CHOLESTEROL: 122 MG/DL (ref 0–130)
SALICYLATE LEVEL: <1 MG/DL (ref 3–10)
TOXIC TRICYCLIC SC,BLOOD: NEGATIVE
TRIGL SERPL-MCNC: 84 MG/DL

## 2022-04-28 PROCEDURE — 36415 COLL VENOUS BLD VENIPUNCTURE: CPT

## 2022-04-28 PROCEDURE — 80143 DRUG ASSAY ACETAMINOPHEN: CPT

## 2022-04-28 PROCEDURE — 2060000000 HC ICU INTERMEDIATE R&B

## 2022-04-28 PROCEDURE — 95816 EEG AWAKE AND DROWSY: CPT

## 2022-04-28 PROCEDURE — 95816 EEG AWAKE AND DROWSY: CPT | Performed by: STUDENT IN AN ORGANIZED HEALTH CARE EDUCATION/TRAINING PROGRAM

## 2022-04-28 PROCEDURE — 51701 INSERT BLADDER CATHETER: CPT

## 2022-04-28 PROCEDURE — 80061 LIPID PANEL: CPT

## 2022-04-28 PROCEDURE — 80307 DRUG TEST PRSMV CHEM ANLYZR: CPT

## 2022-04-28 PROCEDURE — G0480 DRUG TEST DEF 1-7 CLASSES: HCPCS

## 2022-04-28 PROCEDURE — 70551 MRI BRAIN STEM W/O DYE: CPT

## 2022-04-28 PROCEDURE — 6360000002 HC RX W HCPCS: Performed by: STUDENT IN AN ORGANIZED HEALTH CARE EDUCATION/TRAINING PROGRAM

## 2022-04-28 PROCEDURE — 95819 EEG AWAKE AND ASLEEP: CPT | Performed by: PSYCHIATRY & NEUROLOGY

## 2022-04-28 PROCEDURE — 6370000000 HC RX 637 (ALT 250 FOR IP): Performed by: STUDENT IN AN ORGANIZED HEALTH CARE EDUCATION/TRAINING PROGRAM

## 2022-04-28 PROCEDURE — 51798 US URINE CAPACITY MEASURE: CPT

## 2022-04-28 PROCEDURE — 83036 HEMOGLOBIN GLYCOSYLATED A1C: CPT

## 2022-04-28 PROCEDURE — 80179 DRUG ASSAY SALICYLATE: CPT

## 2022-04-28 PROCEDURE — 2580000003 HC RX 258: Performed by: STUDENT IN AN ORGANIZED HEALTH CARE EDUCATION/TRAINING PROGRAM

## 2022-04-28 RX ORDER — PROCHLORPERAZINE EDISYLATE 5 MG/ML
10 INJECTION INTRAMUSCULAR; INTRAVENOUS EVERY 6 HOURS PRN
Status: DISCONTINUED | OUTPATIENT
Start: 2022-04-28 | End: 2022-04-30 | Stop reason: HOSPADM

## 2022-04-28 RX ORDER — MAGNESIUM SULFATE 1 G/100ML
1000 INJECTION INTRAVENOUS ONCE
Status: COMPLETED | OUTPATIENT
Start: 2022-04-28 | End: 2022-04-29

## 2022-04-28 RX ORDER — LAMOTRIGINE 25 MG/1
25 TABLET ORAL DAILY
Status: DISCONTINUED | OUTPATIENT
Start: 2022-04-28 | End: 2022-04-30 | Stop reason: HOSPADM

## 2022-04-28 RX ORDER — LAMOTRIGINE 25 MG/1
25 TABLET ORAL DAILY
Status: CANCELLED | OUTPATIENT
Start: 2022-04-28

## 2022-04-28 RX ORDER — MAGNESIUM SULFATE 1 G/100ML
1000 INJECTION INTRAVENOUS ONCE
Status: COMPLETED | OUTPATIENT
Start: 2022-04-28 | End: 2022-04-28

## 2022-04-28 RX ORDER — FENTANYL CITRATE 50 UG/ML
25 INJECTION, SOLUTION INTRAMUSCULAR; INTRAVENOUS ONCE
Status: COMPLETED | OUTPATIENT
Start: 2022-04-28 | End: 2022-04-28

## 2022-04-28 RX ORDER — LAMOTRIGINE 25 MG/1
TABLET ORAL
Qty: 30 TABLET | Refills: 3 | Status: SHIPPED | OUTPATIENT
Start: 2022-04-28 | End: 2022-04-29 | Stop reason: SDUPTHER

## 2022-04-28 RX ADMIN — SODIUM CHLORIDE, PRESERVATIVE FREE 10 ML: 5 INJECTION INTRAVENOUS at 21:30

## 2022-04-28 RX ADMIN — ENOXAPARIN SODIUM 30 MG: 100 INJECTION SUBCUTANEOUS at 08:46

## 2022-04-28 RX ADMIN — SODIUM CHLORIDE, PRESERVATIVE FREE 10 ML: 5 INJECTION INTRAVENOUS at 08:46

## 2022-04-28 RX ADMIN — LEVETIRACETAM 500 MG: 500 TABLET, FILM COATED ORAL at 08:43

## 2022-04-28 RX ADMIN — METOPROLOL TARTRATE 25 MG: 50 TABLET, FILM COATED ORAL at 08:42

## 2022-04-28 RX ADMIN — MAGNESIUM SULFATE HEPTAHYDRATE 1000 MG: 1 INJECTION, SOLUTION INTRAVENOUS at 14:08

## 2022-04-28 RX ADMIN — ONDANSETRON 4 MG: 2 INJECTION INTRAMUSCULAR; INTRAVENOUS at 03:32

## 2022-04-28 RX ADMIN — FENTANYL CITRATE 25 MCG: 50 INJECTION, SOLUTION INTRAMUSCULAR; INTRAVENOUS at 03:06

## 2022-04-28 RX ADMIN — CITALOPRAM 40 MG: 20 TABLET, FILM COATED ORAL at 08:43

## 2022-04-28 RX ADMIN — METOPROLOL TARTRATE 25 MG: 50 TABLET, FILM COATED ORAL at 21:29

## 2022-04-28 RX ADMIN — ENOXAPARIN SODIUM 30 MG: 100 INJECTION SUBCUTANEOUS at 21:29

## 2022-04-28 RX ADMIN — LAMOTRIGINE 25 MG: 25 TABLET ORAL at 16:35

## 2022-04-28 RX ADMIN — PRAVASTATIN SODIUM 20 MG: 20 TABLET ORAL at 08:43

## 2022-04-28 RX ADMIN — LOSARTAN POTASSIUM 100 MG: 50 TABLET, FILM COATED ORAL at 08:43

## 2022-04-28 ASSESSMENT — PAIN SCALES - GENERAL
PAINLEVEL_OUTOF10: 8
PAINLEVEL_OUTOF10: 5
PAINLEVEL_OUTOF10: 8
PAINLEVEL_OUTOF10: 4

## 2022-04-28 ASSESSMENT — PAIN DESCRIPTION - LOCATION
LOCATION: HEAD

## 2022-04-28 NOTE — PROCEDURES
Date: 4/28/2022  Referring physician: Dr. Jannie Rodriguez     Indication  Patient aged 45 y with seizures. EEG done to assess for epileptiform activity. Introduction  This routine 30-minute EEG was recorded using the International 10-20 System on a [employer] workstation at 256 samples/s. Automated spike and seizure detection algorithms were applied. Description  During the maximal alert state, a well-regulated, symmetric, and reactive 8-9 Hz posterior dominant rhythm was seen. No consistent focal slowing or interhemispheric asymmetry was noted. Stage I and stage II sleep were observed. There were few left fronto-temporal sharps. Activations  Hyperventilation was not performed. Intermittent photic stimulation was performed and demonstrated no posterior driving response. Impression  Abnormal awake EEG. The presence of left temporal sharps increases patient risk for focal seizures with or without secondary generalization. Donaldo Cason MD  Epilepsy Board Certified. Neurology Board Certified.     Electronically Signed

## 2022-04-28 NOTE — FLOWSHEET NOTE
SPIRITUAL CARE DEPARTMENT - [address]  PROGRESS NOTE    Shift date: 4.27.2022  Shift day: Wednesday   Shift # 2    Room # 15/15   Name: Renard Player            Age: 45 y.o. Gender: female          Catholic: unknown   Place of Yazidism: unknown    Referral: Routine Visit    Admit Date & Time: 4/27/2022  6:24 PM    PATIENT/EVENT DESCRIPTION:  Renard Player is a 45 y.o. female        SPIRITUAL ASSESSMENT/INTERVENTION:  Family appears to be calm and coping. Nurse asked if  could escort family to bedside.  provided space for feelings, thoughts, and concerns. Determined support to be available. Provided hospitality. Maintained presence. SPIRITUAL CARE FOLLOW-UP PLAN:  Chaplains will remain available to offer spiritual and emotional support as needed.       Electronically signed by Clyde Wheatley on 4/28/2022 at 12:24 AM.  [facility]  [phone]       04/27/22 2228   Encounter Summary   Encounter Overview/Reason  Initial Encounter   Service Provided For: Family   Referral/Consult From: Nurse   Support System Family members   Last Encounter  04/27/22   Complexity of Encounter Low   Begin Time 1029   End Time  1044   Total Time Calculated 15 min   Encounter    Type Initial Screen/Assessment   Assessment/Intervention/Outcome   Assessment Calm;Coping   Intervention Active listening;Explored/Affirmed feelings, thoughts, concerns   Outcome Expressed Gratitude     Electronically signed by Velia Melo on 4/28/2022 at 12:24 AM

## 2022-04-28 NOTE — CARE COORDINATION
Case Management Initial Discharge Plan  Jael             Met with:patient to discuss discharge plans. Information verified: address, contacts, phone number, , insurance Yes  Insurance Provider: no insurance     Emergency Contact/Next of Kin name & number: brother Nika Oliveira [phone]  Who are involved in patient's support system? Lives alone states can get help    PCP: No primary care provider on file. Date of last visit:       Discharge Planning    Living Arrangements:    home alone    Home has 1 stories  0 stairs to climb to get into front door, 0 stairs to climb to reach second floor  Location of bedroom/bathroom in home 1st floor    Patient able to perform ADL's:Independent    Current Services (outpatient & in home) n/a  DME equipment: n/a  DME provider: n/a    Is patient receiving oral anticoagulation therapy? No    If indicated:   Physician managing anticoagulation treatment:   Where does patient obtain lab work for ATC treatment? Does patient have any issues/concerns obtaining medications? No  If yes, what are patient's concerns? Is there a preferred Pharmacy after hours or on weekends? Yes    If yes, which pharmacy? [facility]    Potential Assistance Needed:   aid service    Patient agreeable to home care: Yes  Freedom of choice provided:  NO PCP    Prior SNF/Rehab Placement and Facility: no  Agreeable to SNF/Rehab: No  [place] of choice provided: n/a     Evaluation: n/a    Expected Discharge date:       Patient expects to be discharged to:   Home independent    If home: is the family and/or caregiver wiling & able to provide support at home? no  Who will be providing this support?  States she can get someone to help    Follow Up Appointment: Best Day/ Time:      Transportation provider: getachew  Transportation arrangements needed for discharge: No    Readmission Risk              Risk of Unplanned Readmission:  9             Does patient have a readmission risk score greater than 14?: No  If yes, follow-up appointment must be made within 7 days of discharge. Goals of Care: safety      Educated patient on transitional options, provided freedom of choice and are agreeable with plan      Discharge Plan: Home independent. No PCP. Provided home care list.  Has transportation. Call to [facility] to see pt. She does not have insurance.           Electronically signed by Vera Best RN on 4/28/22 at 6:45 PM EDT

## 2022-04-28 NOTE — ED NOTES
The following labs labeled with pt sticker and tubed to lab:     [] Blue     [] Lavender   [] on ice  [] Green/yellow  [] Green/black [] on ice  [] Yellow  [] Red  [] Pink      [] COVID-19 swab    [] Rapid  [] PCR  [] Flu swab  [] Peds Viral Panel     [x] Urine Sample  [] Pelvic Cultures  [] Blood Cultures            Shruthi Patrick RN  04/27/22 2054

## 2022-04-28 NOTE — PLAN OF CARE
Problem: Discharge Planning  Goal: Discharge to home or other facility with appropriate resources  4/28/2022 0443 by Delfin Lima RN  Outcome: Progressing  4/28/2022 0441 by Delfin Lima RN  Outcome: Progressing     Problem: Pain  Goal: Verbalizes/displays adequate comfort level or baseline comfort level  4/28/2022 0443 by Delfin Lima RN  Outcome: Progressing  4/28/2022 0441 by Delfin Lima RN  Outcome: Progressing

## 2022-04-29 LAB
EKG ATRIAL RATE: 72 BPM
EKG P AXIS: 26 DEGREES
EKG P-R INTERVAL: 188 MS
EKG Q-T INTERVAL: 408 MS
EKG QRS DURATION: 76 MS
EKG QTC CALCULATION (BAZETT): 446 MS
EKG R AXIS: 53 DEGREES
EKG T AXIS: 39 DEGREES
EKG VENTRICULAR RATE: 72 BPM

## 2022-04-29 PROCEDURE — APPSS30 APP SPLIT SHARED TIME 16-30 MINUTES: Performed by: NURSE PRACTITIONER

## 2022-04-29 PROCEDURE — 2060000000 HC ICU INTERMEDIATE R&B

## 2022-04-29 PROCEDURE — 6370000000 HC RX 637 (ALT 250 FOR IP): Performed by: STUDENT IN AN ORGANIZED HEALTH CARE EDUCATION/TRAINING PROGRAM

## 2022-04-29 PROCEDURE — 2580000003 HC RX 258: Performed by: STUDENT IN AN ORGANIZED HEALTH CARE EDUCATION/TRAINING PROGRAM

## 2022-04-29 PROCEDURE — 6360000002 HC RX W HCPCS: Performed by: STUDENT IN AN ORGANIZED HEALTH CARE EDUCATION/TRAINING PROGRAM

## 2022-04-29 PROCEDURE — 99239 HOSP IP/OBS DSCHRG MGMT >30: CPT | Performed by: NURSE PRACTITIONER

## 2022-04-29 PROCEDURE — 97162 PT EVAL MOD COMPLEX 30 MIN: CPT

## 2022-04-29 PROCEDURE — 93010 ELECTROCARDIOGRAM REPORT: CPT | Performed by: INTERNAL MEDICINE

## 2022-04-29 PROCEDURE — 97530 THERAPEUTIC ACTIVITIES: CPT

## 2022-04-29 PROCEDURE — 94761 N-INVAS EAR/PLS OXIMETRY MLT: CPT

## 2022-04-29 PROCEDURE — 6360000002 HC RX W HCPCS: Performed by: NURSE PRACTITIONER

## 2022-04-29 RX ORDER — LAMOTRIGINE 25 MG/1
TABLET ORAL
Qty: 30 TABLET | Refills: 3 | Status: SHIPPED | OUTPATIENT
Start: 2022-04-29 | End: 2022-04-29 | Stop reason: SDUPTHER

## 2022-04-29 RX ORDER — KETOROLAC TROMETHAMINE 15 MG/ML
15 INJECTION, SOLUTION INTRAMUSCULAR; INTRAVENOUS ONCE
Status: COMPLETED | OUTPATIENT
Start: 2022-04-29 | End: 2022-04-29

## 2022-04-29 RX ORDER — AMOXICILLIN AND CLAVULANATE POTASSIUM 875; 125 MG/1; MG/1
1 TABLET, FILM COATED ORAL EVERY 12 HOURS SCHEDULED
Status: DISCONTINUED | OUTPATIENT
Start: 2022-04-29 | End: 2022-04-30 | Stop reason: HOSPADM

## 2022-04-29 RX ORDER — LAMOTRIGINE 25 MG/1
TABLET ORAL
Qty: 70 TABLET | Refills: 3 | Status: SHIPPED | OUTPATIENT
Start: 2022-04-29 | End: 2022-04-29 | Stop reason: SDUPTHER

## 2022-04-29 RX ORDER — LAMOTRIGINE 25 MG/1
TABLET ORAL
Qty: 70 TABLET | Refills: 3 | Status: SHIPPED | OUTPATIENT
Start: 2022-04-29 | End: 2022-04-30 | Stop reason: SDUPTHER

## 2022-04-29 RX ADMIN — KETOROLAC TROMETHAMINE 15 MG: 15 INJECTION, SOLUTION INTRAMUSCULAR; INTRAVENOUS at 11:04

## 2022-04-29 RX ADMIN — METOPROLOL TARTRATE 25 MG: 50 TABLET, FILM COATED ORAL at 08:03

## 2022-04-29 RX ADMIN — LOSARTAN POTASSIUM 100 MG: 50 TABLET, FILM COATED ORAL at 08:04

## 2022-04-29 RX ADMIN — LAMOTRIGINE 25 MG: 25 TABLET ORAL at 08:04

## 2022-04-29 RX ADMIN — ENOXAPARIN SODIUM 30 MG: 100 INJECTION SUBCUTANEOUS at 08:04

## 2022-04-29 RX ADMIN — ONDANSETRON 4 MG: 2 INJECTION INTRAMUSCULAR; INTRAVENOUS at 11:07

## 2022-04-29 RX ADMIN — MAGNESIUM SULFATE HEPTAHYDRATE 1000 MG: 1 INJECTION, SOLUTION INTRAVENOUS at 00:11

## 2022-04-29 RX ADMIN — SODIUM CHLORIDE, PRESERVATIVE FREE 10 ML: 5 INJECTION INTRAVENOUS at 19:41

## 2022-04-29 RX ADMIN — CITALOPRAM 40 MG: 20 TABLET, FILM COATED ORAL at 08:04

## 2022-04-29 RX ADMIN — SODIUM CHLORIDE, PRESERVATIVE FREE 10 ML: 5 INJECTION INTRAVENOUS at 11:03

## 2022-04-29 RX ADMIN — ENOXAPARIN SODIUM 30 MG: 100 INJECTION SUBCUTANEOUS at 20:26

## 2022-04-29 RX ADMIN — AMOXICILLIN AND CLAVULANATE POTASSIUM 1 TABLET: 875; 125 TABLET, FILM COATED ORAL at 21:57

## 2022-04-29 RX ADMIN — PRAVASTATIN SODIUM 20 MG: 20 TABLET ORAL at 08:03

## 2022-04-29 RX ADMIN — KETOROLAC TROMETHAMINE 15 MG: 15 INJECTION, SOLUTION INTRAMUSCULAR; INTRAVENOUS at 19:41

## 2022-04-29 ASSESSMENT — PAIN DESCRIPTION - DESCRIPTORS
DESCRIPTORS: POUNDING;ACHING
DESCRIPTORS: ACHING
DESCRIPTORS: ACHING

## 2022-04-29 ASSESSMENT — PAIN SCALES - GENERAL
PAINLEVEL_OUTOF10: 5
PAINLEVEL_OUTOF10: 9
PAINLEVEL_OUTOF10: 8
PAINLEVEL_OUTOF10: 5
PAINLEVEL_OUTOF10: 3

## 2022-04-29 ASSESSMENT — PAIN DESCRIPTION - LOCATION
LOCATION: HEAD
LOCATION: HEAD
LOCATION: HEAD;NECK
LOCATION: HEAD

## 2022-04-29 ASSESSMENT — PAIN DESCRIPTION - ORIENTATION
ORIENTATION: RIGHT;LEFT
ORIENTATION: POSTERIOR

## 2022-04-29 NOTE — DISCHARGE INSTR - ACTIVITY
No driving or operating heavy machinery until cleared by the outpatient neurology office. Avoid open water, fire, and climbing to high heights.

## 2022-04-29 NOTE — DISCHARGE SUMMARY
[facility] Neurology  [address] [facility]    Discharge Summary     Patient ID: Renard Dong  :  1970   MRN: [id number]     ACCOUNT:  [de-identified]   Patient's PCP: No primary care provider on file. Admit Date: 2022   Discharge Date: 2022  Length of Stay: 2  Code Status:  Full Code  Admitting Physician: Farooq Erwin MD  Discharge Physician: ANNA Graves CNP     Active Discharge Diagnoses:     Hospital Problem Lists:  Principal Problem:    Seizure [facility])  Resolved Problems:    * No resolved hospital problems. *      Admission Condition:  poor     Discharged Condition: stable    Hospital Stay:     Hospital Course:  Renard Dong is a 45 y.o. female who was admitted for the management of breakthrough seizure. Patient reports that she was at work and felt a little bit dizzy with a similar presentation of how she used to feel before she would have a seizure. She then had a seizure with loss of consciousness, witnessed by her coworkers. Upon arrival to the ED she had another 1 minute seizure, was loaded with IV Keppra 2 g and given a total of 4 mg of Versed. She reports she is supposed to be taking Depakote but was unable to afford it. MRI brain is normal.  EEG shows left temporal sharp waves. She was started on Lamictal 25 mg a day increasing by 25 mg on a weekly basis until she reaches the target dose of 100 mg twice daily. On day of discharge she is alert and oriented. Able to ambulate up to use the bathroom. Reports being at her baseline. No further seizure like activity.       Significant therapeutic interventions: Started on Lamictal with upward titration    Significant Diagnostic Studies:   Labs / Micro:  CBC:   Lab Results   Component Value Date    WBC 5.5 2022    RBC 5.35 2022    RBC 5.15 2012    HGB 12.2 2022    HCT 41.5 2022    MCV 77.6 2022    MCH 22.8 2022    MCHC 29.4 2022    RDW 14.9 04/27/2022     04/27/2022     04/01/2012     BMP:    Lab Results   Component Value Date    GLUCOSE 96 04/27/2022    GLUCOSE 133 04/01/2012     04/27/2022    K 3.6 04/27/2022     04/27/2022    CO2 22 04/27/2022    ANIONGAP 12 04/27/2022    BUN 7 04/27/2022    CREATININE 0.64 04/27/2022    BUNCRER NOT REPORTED 10/25/2021    CALCIUM 9.1 04/27/2022    LABGLOM >60 04/27/2022    GFRAA >60 04/27/2022    GFR      04/27/2022         Radiology:    XR FOOT LEFT (MIN 3 VIEWS)    Result Date: 4/28/2022  EXAMINATION: THREE XRAY VIEWS OF THE LEFT FOOT 4/27/2022 11:32 pm COMPARISON: 11/10/2011. HISTORY: ORDERING SYSTEM PROVIDED HISTORY: pain TECHNOLOGIST PROVIDED HISTORY: pain Reason for Exam: foot pain FINDINGS: No fractures or dislocations. No suspicious focal bony lesions. No bony erosions or bony destructive changes. Plantar calcaneal bone spur. No degenerative changes noted. Lisfranc alignment is maintained. No acute soft tissue abnormalities. No acute abnormality. CT HEAD WO CONTRAST    Result Date: 4/27/2022  EXAMINATION: CT OF THE HEAD WITHOUT CONTRAST  4/27/2022 7:52 pm TECHNIQUE: CT of the head was performed without the administration of intravenous contrast. Dose modulation, iterative reconstruction, and/or weight based adjustment of the mA/kV was utilized to reduce the radiation dose to as low as reasonably achievable. COMPARISON: None. HISTORY: ORDERING SYSTEM PROVIDED HISTORY: seizure TECHNOLOGIST PROVIDED HISTORY: seizure Decision Support Exception - unselect if not a suspected or confirmed emergency medical condition->Emergency Medical Condition (MA) Is the patient pregnant?->No Reason for Exam: seizure FINDINGS: BRAIN/VENTRICLES: There is no acute intracranial hemorrhage, mass effect or midline shift. No abnormal extra-axial fluid collection. The gray-white differentiation is maintained without evidence of an acute infarct. There is no evidence of hydrocephalus.  ORBITS: The visualized portion of the orbits demonstrate no acute abnormality. SINUSES: Mild mucosal thickening of the maxillary sinuses and ethmoidal air cells. Severe opacification of right middle ear cavity and right mastoid air cell suggestive of right-sided otomastoiditis. The visualized paranasal sinuses and mastoid air cells demonstrate no other abnormality. SOFT TISSUES/SKULL:  No acute abnormality of the visualized skull or soft tissues. No acute intracranial abnormality. No acute territorial infarction, intracranial hemorrhage or mass lesion. Mild mucosal thickening of the maxillary sinuses and ethmoidal air cells. Severe opacification of right middle ear cavity and right mastoid air cell suggestive of right-sided otomastoiditis. XR CHEST PORTABLE    Result Date: 4/27/2022  EXAMINATION: ONE XRAY VIEW OF THE CHEST 4/27/2022 7:18 pm COMPARISON: October 25, 2021. HISTORY: ORDERING SYSTEM PROVIDED HISTORY: seizure TECHNOLOGIST PROVIDED HISTORY: seizure Reason for Exam: upr,seizure FINDINGS: Mild bilateral interstitial infiltrates/edema. Cardiomegaly. Mediastinum normal.  Bony thorax intact. Mild bilateral interstitial infiltrates or edema     MRI brain without contrast    Result Date: 4/28/2022  EXAMINATION: MRI OF THE BRAIN WITHOUT CONTRAST  4/28/2022 10:17 am TECHNIQUE: Multiplanar multisequence MRI of the brain was performed without the administration of intravenous contrast. COMPARISON: CT brain performed 04/27/2022. HISTORY: ORDERING SYSTEM PROVIDED HISTORY: seizure protocol TECHNOLOGIST PROVIDED HISTORY: seizure protocol Decision Support Exception - unselect if not a suspected or confirmed emergency medical condition->Emergency Medical Condition (MA) Is the patient pregnant?->No Reason for Exam: Seizure Protocol FINDINGS: INTRACRANIAL STRUCTURES/VENTRICLES: The sellar and suprasellar structures, optic chiasm, corpus callosum, pineal gland, tectum, and midline brainstem structures are unremarkable.   The craniocervical junction is unremarkable. There is no acute hemorrhage, mass effect, or midline shift. There is satisfactory overall gray-white matter differentiation. The ventricular structures are symmetric and unremarkable. The infratentorial structures including the cerebellopontine angles and internal auditory canals are unremarkable. There is no abnormal restricted diffusion. There is no abnormal blooming artifact on susceptibility weighted imaging. ORBITS: The visualized portion of the orbits demonstrate no acute abnormality. SINUSES: There is fluid in the right mastoid air cells. There is a polyp versus mucous retention cyst in the right maxillary sinus. There is mucosal thickening in the left maxillary sinus. BONES/SOFT TISSUES: The bone marrow signal intensity appears normal. The soft tissues demonstrate no acute abnormality. No acute intracranial abnormality. Chronic sinusitis involving the left maxillary sinus. Polyp versus mucous retention cyst in the right maxillary sinus. Fluid in the right mastoid air cells representing simple effusion versus a degree mastoiditis. Consultations:    Consults:     Final Specialist Recommendations/Findings:   IP CONSULT TO NEUROLOGY      The patient was seen and examined on day of discharge and this discharge summary is in conjunction with any daily progress note from day of discharge. Discharge plan:     Disposition: Home    Physician Follow Up: [place] [address]  [phone]    In 4 weeks  For medication follow up    [address]  Schedule an appointment as soon as possible for a visit         Requiring Further Evaluation/Follow Up POST HOSPITALIZATION/Incidental Findings: Follow up as listed above.      Diet: regular diet    Activity: As tolerated    Instructions to Patient: No driving or operating heavy machinery until cleared by the outpatient neurology office. Avoid open water, fire, and climbing to high heights. Discharge Medications:      Medication List      START taking these medications    lamoTRIgine 25 MG tablet  Commonly known as: LaMICtal  Weekly titation: Week 1: 25mg QAM Week 2: 25mg BID Week 3: 25mg QAM, 50mg QPM Week 4: 50mg BID Week 5: 50mg QAM, 75mg QPM Week 6: 75mg BID Week 7: 75mg QAM, 100mg QPM Week 8: 100mg BID        CHANGE how you take these medications    metoprolol tartrate 25 MG tablet  Commonly known as: LOPRESSOR  Take 1 tablet by mouth 2 times daily  What changed:   · medication strength  · how much to take  · how to take this  · when to take this  · additional instructions        CONTINUE taking these medications    AMITRIPTYLINE HCL PO     benzocaine-menthol 15-3.6 MG lozenge  Commonly known as: Cepacol Sore Throat  Take 1 lozenge by mouth every 2 hours as needed for Sore Throat     citalopram 20 MG tablet  Commonly known as: CELEXA     cyclobenzaprine 10 MG tablet  Commonly known as: FLEXERIL  Take 1 tablet by mouth 3 times daily as needed for Muscle spasms     diclofenac sodium 1 % Gel  Commonly known as: VOLTAREN  Apply 2 g topically 2 times daily     dicyclomine 10 MG capsule  Commonly known as: Bentyl  Take 1 capsule by mouth every 6 hours as needed (cramps)     EPINEPHrine 0.3 MG/0.3ML Soaj injection  Commonly known as: EPIPEN     indomethacin 50 MG capsule  Commonly known as: INDOCIN  Take 1 capsule by mouth 3 times daily (with meals). lidocaine 5 %  Commonly known as: Lidoderm  Place 1 patch onto the skin daily 12 hours on, 12 hours off.     losartan 50 MG tablet  Commonly known as: COZAAR     ondansetron 4 MG tablet  Commonly known as: Zofran  Take 1 tablet by mouth every 8 hours as needed for Nausea.      pravastatin 20 MG tablet  Commonly known as: PRAVACHOL     * Tennis Elbow Support Misc  1 Device by Does not apply route as needed     * Knee Brace Adjustable Hinged Misc  1 Device by Does not apply route as needed (comfort)     Wrist Brace Misc  1 Device by Does not apply route daily One brace to be worn at night and daily as needed for carpal tunnel syndrome. Brace should place the wrist in neutral.         * This list has 2 medication(s) that are the same as other medications prescribed for you. Read the directions carefully, and ask your doctor or other care provider to review them with you. STOP taking these medications    divalproex 500 MG DR tablet  Commonly known as: Depakote     divalproex 500 MG extended release tablet  Commonly known as: DEPAKOTE ER           Where to Get Your Medications      These medications were sent to [address] [place] [address]    Phone: [phone]   · metoprolol tartrate 25 MG tablet     You can get these medications from any pharmacy    Bring a paper prescription for each of these medications  · lamoTRIgine 25 MG tablet         Time Spent on discharge is  33 mins in patient examination, evaluation, counseling as well as medication reconciliation, prescriptions for required medications, discharge plan and follow up. Electronically signed by   ANNA Bell CNP  4/29/2022  1:20 PM      Thank you Dr. Early primary care provider on file. for the opportunity to be involved in this patient's care.

## 2022-04-29 NOTE — PLAN OF CARE
Extensive discussion with patient and patient's family at the bedside. They are adamant that they do not want patient to discharge home today. They feel as though she has not had enough work with physical therapy and is too unstable to leave, despite her being able to walk with physical therapy 110 feet today with a walker which she does already have at home. Family reports they are unable to stay with the patient, and she does not desire further therapies as she does not have insurance. Patient agreeable to stay overnight and discharge in the morning. Her upward titration of Lamictal has been called into [facility] [address].

## 2022-04-29 NOTE — PROGRESS NOTES
CLINICAL PHARMACY NOTE: MEDS TO BEDS    Total # of Prescriptions Filled: 1   The following medications were delivered to the patient:  · Lamictal    Additional Documentation:  Voucher was rec'd

## 2022-04-29 NOTE — PROGRESS NOTES
NEUROLOGY INPATIENT PROGRESS NOTE    4/29/2022         Current Exam:     Chart reviewed. Discussed with RN. She reports a headache, 8/10 in intensity. No further seizure like activity. Agreeable to discharge home later today. Brief History:    Nathan Rodas is a  45 y.o. female with H/O seizures however seizure-free for the past 10 years, HLD, HTN, who was admitted on 4/27/2022 with breakthrough seizure. Patient reports that she was at work and felt a little bit dizzy with a similar presentation of how she used to feel before she would have a seizure. She then had a seizure with loss of consciousness, witnessed by her coworkers. Upon arrival to the ED she had another 1 minute seizure, was loaded with IV Keppra 2 g and given a total of 4 mg of Versed. She reports she is supposed to be taking Depakote but was unable to afford it. MRI brain is normal.  EEG shows left temporal sharp waves. She was started on Lamictal 25 mg a day increasing by 25 mg on a weekly basis until she reaches the target dose of 100 mg twice daily. No current facility-administered medications on file prior to encounter. Current Outpatient Medications on File Prior to Encounter   Medication Sig Dispense Refill    lidocaine (LIDODERM) 5 % Place 1 patch onto the skin daily 12 hours on, 12 hours off.  (Patient not taking: Reported on 4/28/2022) 5 patch 0    cyclobenzaprine (FLEXERIL) 10 MG tablet Take 1 tablet by mouth 3 times daily as needed for Muscle spasms (Patient not taking: Reported on 4/28/2022) 15 tablet 0    benzocaine-menthol (CEPACOL SORE THROAT) 15-3.6 MG lozenge Take 1 lozenge by mouth every 2 hours as needed for Sore Throat 16 lozenge 0    diclofenac sodium 1 % GEL Apply 2 g topically 2 times daily 1 Tube 0    Elastic Bandages & Supports (KNEE BRACE ADJUSTABLE HINGED) MISC 1 Device by Does not apply route as needed (comfort) 1 each 0    AMITRIPTYLINE HCL PO Take by mouth (Patient not taking: Reported on 4/28/2022)      dicyclomine (BENTYL) 10 MG capsule Take 1 capsule by mouth every 6 hours as needed (cramps) (Patient not taking: Reported on 4/28/2022) 20 capsule 0    Misc. Devices (WRIST BRACE) MISC 1 Device by Does not apply route daily One brace to be worn at night and daily as needed for carpal tunnel syndrome. Brace should place the wrist in neutral. 1 each 0    EPINEPHrine (EPIPEN) 0.3 MG/0.3ML SOAJ injection Inject 0.3 mg into the muscle as needed Use as directed for allergic reaction      divalproex (DEPAKOTE) 500 MG DR tablet Take 1 tablet by mouth daily 90 tablet 3    metoprolol tartrate (LOPRESSOR) 50 MG tablet 2 IN AM AND 1 AT NIGHT (Patient not taking: Reported on 4/28/2022)      divalproex (DEPAKOTE ER) 500 MG ER tablet Take 1 tablet by mouth nightly (Patient not taking: Reported on 4/28/2022) 30 tablet 1    Elastic Bandages & Supports (TENNIS ELBOW SUPPORT) MISC 1 Device by Does not apply route as needed 1 each 0    pravastatin (PRAVACHOL) 20 MG tablet Take 20 mg by mouth daily. (Patient not taking: Reported on 4/28/2022)      ondansetron (ZOFRAN) 4 MG tablet Take 1 tablet by mouth every 8 hours as needed for Nausea. (Patient not taking: Reported on 4/28/2022) 12 tablet 0    indomethacin (INDOCIN) 50 MG capsule Take 1 capsule by mouth 3 times daily (with meals). (Patient not taking: Reported on 4/28/2022) 30 capsule 0    citalopram (CELEXA) 20 MG tablet Take 40 mg by mouth daily. (Patient not taking: Reported on 4/28/2022)      losartan (COZAAR) 50 MG tablet Take 100 mg by mouth daily. (Patient not taking: Reported on 4/28/2022)         Allergies: Diana Jiménez is allergic to latex, motrin [ibuprofen], norco [hydrocodone-acetaminophen], tramadol, naproxen, and tylenol [acetaminophen]. Past Medical History:   Diagnosis Date    Asthma     Depression     ESBL (extended spectrum beta-lactamase) producing bacteria infection 8/8/20214    E.  Coli urine    Fibromyalgia     Gastroparesis  Headache     Hyperlipidemia     Hypertension     Neuropathy     Osteoarthritis     Seizure ([facility] [address] 4/27/2022    Tennis elbow     right       Past Surgical History:   Procedure Laterality Date    ADENOIDECTOMY      BREAST SURGERY      CARDIAC CATHETERIZATION  7-    COSMETIC SURGERY      nose    HYSTERECTOMY      KNEE SURGERY      SINUS SURGERY      TONSILLECTOMY         Social History: Doc Player  reports that she has never smoked. She has never used smokeless tobacco. She reports current alcohol use. She reports current drug use. Drug: Marijuana Emytiburcio [place]). Family History   Problem Relation Age of Onset    Cancer Mother         breast and bone    Heart Disease Father     Diabetes Sister     High Blood Pressure Sister     Diabetes Brother     High Blood Pressure Brother     Heart Disease Maternal Grandmother     Cancer Maternal Grandmother         breast    Cancer Paternal Grandmother     Heart Disease Paternal Grandfather        Objective:   BP (!) 148/75   Pulse 63   Temp 97.4 °F (36.3 °C) (Oral)   Resp 15   Ht 5' 8\" (1.727 m)   Wt 260 lb (117.9 kg)   SpO2 97%   BMI 39.53 kg/m²     Blood pressure range: Systolic (70XTM), PPC:265 , Min:109 , XFM:404   ; Diastolic (88NDJ), PEE:77, Min:53, Max:76      Review of Systems:  Constitutional  Negative for fever and chills    HEENT  Negative for ear discharge, ear pain, nosebleed. + headache   Eyes  Negative for photophobia, pain and discharge    Respiratory  Negative for hemoptysis and sputum    Cardiovascular  Negative for orthopnea, claudication and PND    Gastrointestinal  Negative for abdominal pain, diarrhea, blood in stool    Musculoskeletal  Negative for joint pain, negative for myalgia    Skin  Negative for rash or itching    Endo/heme/allergies  Negative for polydipsia, environmental allergy    Psychiatric/behavioral  Negative for suicidal ideation.  Patient is not anxious        NEUROLOGIC EXAMINATION  GENERAL  Appears comfortable and in no distress   HEENT  NC/ AT   NECK  Supple   MENTAL STATUS:  Alert, oriented, intact memory, no confusion, normal speech, normal language, no hallucination or delusion   CRANIAL NERVES: II     -      Visual fields intact to confrontation  III,IV,VI -  EOMs full, no afferent defect, no CHADD, no ptosis  V     -     Normal facial sensation  VII    -     Normal facial symmetry  VIII   -     Intact hearing  IX,X -     Symmetrical palate  XI    -     Symmetrical shoulder shrug  XII   -     Midline tongue, no atrophy    MOTOR FUNCTION:  Lifts all limbs with normal bulk, normal tone and no involuntary movements, no tremor   SENSORY FUNCTION:  Normal touch, normal pin   CEREBELLAR FUNCTION:  Intact fine motor control over upper limbs   REFLEX FUNCTION:  Symmetric, no perverted reflex, no Babinski sign   STATION and GAIT  Not tested       Data:    Lab Results:   CBC:   Recent Labs     04/27/22  1857   WBC 5.5   HGB 12.2        BMP:    Recent Labs     04/27/22  1857      K 3.6*      CO2 22   BUN 7   CREATININE 0.64   GLUCOSE 96         Lab Results   Component Value Date    CHOL 195 04/28/2022    LDLCHOLESTEROL 122 04/28/2022    HDL 56 04/28/2022    TRIG 84 04/28/2022    ALT 11 04/27/2022    AST 17 04/27/2022    TSH 3.23 06/29/2018    INR 0.9 05/17/2016    LABA1C 5.2 04/28/2022    WAYCVGFR85 240 11/10/2011           Diagnostic data reviewed:  CT HEAD (4/27/22) -  No acute intracranial abnormality. No acute territorial infarction,   intracranial hemorrhage or mass lesion.       Mild mucosal thickening of the maxillary sinuses and ethmoidal air cells.       Severe opacification of right middle ear cavity and right mastoid air cell   suggestive of right-sided otomastoiditis.          BRAIN MRI (4/28/22) -  No acute intracranial abnormality.       Chronic sinusitis involving the left maxillary sinus.       Polyp versus mucous retention cyst in the right maxillary sinus.       Fluid in the right mastoid air cells representing simple effusion versus a   degree mastoiditis. EEG (4/28/22) -  Abnormal awake EEG. The presence of left temporal sharps increases patient risk for focal seizures with or without secondary generalization. Impression:  -Breakthrough seizure  -History of epilepsy but has been seizure-free for the past 10 years    Plan:  -Initiated on Lamictal 25 mg a day and increasing by 25 mg on a weekly basis until she reaches the target dose of 100 mg twice daily  -Toradol and Zofran x1 for headache; patient in agreement and states her allergy only causes upset stomach  -Avoid excessive alcohol use, obtain adequate sleep  -No driving or operating heavy machinery until cleared by the outpatient neurology office. Avoid open water, fire, and climbing to high heights.  -Neurologically clear. Will plan for discharge. Follow-up in the clinic in 6 weeks. Please note that this note was generated using a voice recognition dictation software. Although every effort was made to ensure the accuracy of this automated transcription, some errors in transcription may have occurred.

## 2022-04-29 NOTE — PLAN OF CARE
Problem: Discharge Planning  Goal: Discharge to home or other facility with appropriate resources  Outcome: Adequate for Discharge     Problem: Pain  Goal: Verbalizes/displays adequate comfort level or baseline comfort level  Outcome: Adequate for Discharge     Problem: ABCDS Injury Assessment  Goal: Absence of physical injury  Outcome: Adequate for Discharge   Patient request to stay additional night and will discharge in the morning.

## 2022-04-29 NOTE — PROGRESS NOTES
Physical Therapy  [facility]    Name: Sonam Shaw  : 1970  MRN: [id number]  Date of Service: 2022    Discharge Recommendations: Further therapy recommended at discharge. Chief Complaint   Patient presents with    Seizures     The patient is a 45 y.o. female presented with seizure tonic-clonic, patient said that she has a history of seizure several years ago almost 10 years, patient mentioned that yesterday she felt little bit dizzy the same symptoms when she used to have seizures she feels like she is going to pass out, she developed the same symptoms today and she lost her consciousness, witnessed by coworkers who noticed that she was shaking all over, upon arrival to the ED patient had another episode of seizure lasted for 1 minute, patient was loaded with 2 g of Keppra and a total of 4 mg of Versed, upon assessment patient was sitting comfortably no confusion, no focal weakness, patient mentioned that she supposed to be on Depakote however due to medical insurance issue she was not able to afford it      PT Equipment Recommendations  Equipment Needed: No (owns 2IM)      Patient Diagnosis(es): The primary encounter diagnosis was Bilateral carpal tunnel syndrome. Diagnoses of Seizure ([address] Ear infection, and Left foot pain were also pertinent to this visit. Past Medical History:  has a past medical history of Asthma, Depression, ESBL (extended spectrum beta-lactamase) producing bacteria infection, Fibromyalgia, Gastroparesis, Headache, Hyperlipidemia, Hypertension, Neuropathy, Osteoarthritis, Seizure ([address] and Tennis elbow. Past Surgical History:  has a past surgical history that includes Hysterectomy; Breast surgery; Tonsillectomy; Adenoidectomy; sinus surgery; Cosmetic surgery; knee surgery; and Cardiac catheterization (2015).     Assessment   Body Structures, Functions, Activity Limitations Requiring Skilled Therapeutic Intervention: Decreased functional mobility ; Decreased endurance;Decreased ADL status; Decreased high-level IADLs  Assessment: Pt ambulates 110 ft RW CGA. pt should be safe to return to prior living situation with intermittent support as needed. Pt would benefit from continued therapy to promote endurance, balance, and strengthening. Therapy Prognosis: Good  Decision Making: Medium Complexity  Barriers to Learning: none  Activity Tolerance  Activity Tolerance: Patient limited by endurance; Patient limited by fatigue     Plan   Plan  Plan: 5-7 times per week  Current Treatment Recommendations: Strengthening,Balance training,IADL training,Functional mobility training,Endurance training,Transfer training,ADL/Self-care training,Gait training,Neuromuscular re-education,Home exercise program,Therapeutic activities  Safety Devices  Type of Devices: All fall risk precautions in place,Gait belt,Nurse notified,Call light within reach,Left in bed     Restrictions  Restrictions/Precautions  Restrictions/Precautions: Fall Risk,Up as Tolerated,Seizure  Required Braces or Orthoses?: No     Subjective   General  Patient assessed for rehabilitation services?: Yes  Response To Previous Treatment: Not applicable  Family / Caregiver Present: Yes (Aunt present)  Follows Commands: Within Functional Limits  Subjective  Subjective: RN and pt agreeable to PT. pt agreeable and pleasant. Pt supine in bed at start of session, voicing 7/10 HA.          Social/Functional History  Social/Functional History  Lives With: Alone  Type of Home: Apartment (1st floor apt)  Home Layout: One level  Home Access: Level entry  Bathroom Shower/Tub: Tub/Shower unit  Bathroom Toilet: Handicap height  Home Equipment: Jay Reardon, 4 wheeled (not using AD prior to admission)  Receives Help From: Family  ADL Assistance: [facility] [address]: Independent  Homemaking Responsibilities: Yes  Ambulation Assistance: Independent  Transfer Assistance: Independent  Active : Yes  Mode of Transportation: SUV  Occupation: On disability  Type of Occupation:   Leisure & Hobbies: Sleep  Additional Comments: Pt reports sister lives nearby and could assist PRN  Vision/Hearing  Vision Exceptions: Wears glasses for reading  Hearing: Within functional limits    Cognition   Cognition  Overall Cognitive Status: WFL        Gross Assessment  Sensation: Impaired (head n/t reported by pt)     AROM RLE (degrees)  RLE AROM: WFL  AROM LLE (degrees)  LLE AROM : WFL  AROM RUE (degrees)  RUE AROM : WFL  AROM LUE (degrees)  LUE AROM : WFL  Strength RLE  Strength RLE: WFL  Strength LLE  Strength LLE: WFL  Strength RUE  Strength RUE: WFL  Strength LUE  Strength LUE: WFL           Bed mobility  Supine to Sit: Stand by assistance  Sit to Supine: Stand by assistance  Scooting: Stand by assistance  Comment: HOB elevated  Transfers  Sit to Stand: Contact guard assistance  Stand to sit: Contact guard assistance  Comment: RW for support  Ambulation  Surface: level tile  Device: Rolling Walker  Assistance: Contact guard assistance  Gait Deviations: Slow Laverne;Decreased step length;Decreased step height  Distance: 110 ft  Comments: No LOB noted  More Ambulation?: No  Stairs/Curb  Stairs?: No     Balance  Posture: Good  Sitting - Static: Good  Sitting - Dynamic: Good;-  Standing - Static: Fair  Standing - Dynamic: Fair  Comments: Assessed with RW                                                      AM-PAC Score  AM-PAC Inpatient Mobility Raw Score : 19 (04/29/22 1454)  AM-PAC Inpatient T-Scale Score : 45.44 (04/29/22 1454)  Mobility Inpatient CMS 0-100% Score: 41.77 (04/29/22 1454)  Mobility Inpatient CMS G-Code Modifier : CK (04/29/22 1454)          Goals  Short Term Goals  Time Frame for Short term goals: 14 visits  Short term goal 1: Complete transfers with least restrictive v no AD and mod I  Short term goal 2: Complete 300 ft of gait with least restrictive v no AD and mod I  Short term goal 3: Participate in 30 minutes of therapy to promote endurance       Therapy Time   Individual Concurrent Group Co-treatment   Time In 1400         Time Out 1427         Minutes 27         Timed Code Treatment Minutes: 21 Minutes       Fidelina Johnson, PT

## 2022-04-29 NOTE — CARE COORDINATION
Transitional planning:  Received call from fellow NCM, who states she received a call that the pt cannot afford her Lamictal. Called outpt pharmacy, cost of Rx is $15.98. Met with pt and family member, they state they will take \"paper\" Rx and get medication for $3 thru [facility]     [address] and notified of above. Katheryn Trujillo checked med and cost and said, \"I will take care of it from here. \"

## 2022-04-29 NOTE — PROGRESS NOTES
Patient states she had tightening of the right side of her face and tremoring of her right hand. Nothing noted by Rahul Jose. Neurovascular checks within normal limits. Patient states she has a headache and request something for the discomfort. Neurology resident sent secure message. Awaiting orders. Will continue to monitor.

## 2022-04-30 VITALS
DIASTOLIC BLOOD PRESSURE: 65 MMHG | WEIGHT: 260 LBS | SYSTOLIC BLOOD PRESSURE: 141 MMHG | RESPIRATION RATE: 15 BRPM | HEART RATE: 57 BPM | BODY MASS INDEX: 39.4 KG/M2 | TEMPERATURE: 98.5 F | OXYGEN SATURATION: 99 % | HEIGHT: 68 IN

## 2022-04-30 PROCEDURE — 94761 N-INVAS EAR/PLS OXIMETRY MLT: CPT

## 2022-04-30 PROCEDURE — 6370000000 HC RX 637 (ALT 250 FOR IP): Performed by: STUDENT IN AN ORGANIZED HEALTH CARE EDUCATION/TRAINING PROGRAM

## 2022-04-30 PROCEDURE — 6360000002 HC RX W HCPCS: Performed by: STUDENT IN AN ORGANIZED HEALTH CARE EDUCATION/TRAINING PROGRAM

## 2022-04-30 PROCEDURE — 2580000003 HC RX 258: Performed by: STUDENT IN AN ORGANIZED HEALTH CARE EDUCATION/TRAINING PROGRAM

## 2022-04-30 PROCEDURE — 99253 IP/OBS CNSLTJ NEW/EST LOW 45: CPT | Performed by: OTOLARYNGOLOGY

## 2022-04-30 RX ORDER — AMOXICILLIN AND CLAVULANATE POTASSIUM 875; 125 MG/1; MG/1
1 TABLET, FILM COATED ORAL EVERY 12 HOURS SCHEDULED
Qty: 20 TABLET | Refills: 0 | Status: SHIPPED | OUTPATIENT
Start: 2022-04-30 | End: 2022-04-30 | Stop reason: HOSPADM

## 2022-04-30 RX ORDER — LAMOTRIGINE 25 MG/1
TABLET ORAL
Qty: 70 TABLET | Refills: 3 | Status: SHIPPED | OUTPATIENT
Start: 2022-04-30 | End: 2022-04-30 | Stop reason: SDUPTHER

## 2022-04-30 RX ORDER — LAMOTRIGINE 25 MG/1
TABLET ORAL
Qty: 70 TABLET | Refills: 3 | Status: ON HOLD | OUTPATIENT
Start: 2022-04-30 | End: 2022-08-02

## 2022-04-30 RX ORDER — OFLOXACIN 3 MG/ML
5 SOLUTION AURICULAR (OTIC) 2 TIMES DAILY
Qty: 5 ML | Refills: 0 | Status: SHIPPED | OUTPATIENT
Start: 2022-04-30 | End: 2022-05-10

## 2022-04-30 RX ADMIN — AMOXICILLIN AND CLAVULANATE POTASSIUM 1 TABLET: 875; 125 TABLET, FILM COATED ORAL at 08:42

## 2022-04-30 RX ADMIN — ENOXAPARIN SODIUM 30 MG: 100 INJECTION SUBCUTANEOUS at 08:47

## 2022-04-30 RX ADMIN — LOSARTAN POTASSIUM 100 MG: 50 TABLET, FILM COATED ORAL at 08:42

## 2022-04-30 RX ADMIN — CITALOPRAM 40 MG: 20 TABLET, FILM COATED ORAL at 08:42

## 2022-04-30 RX ADMIN — SODIUM CHLORIDE, PRESERVATIVE FREE 10 ML: 5 INJECTION INTRAVENOUS at 08:43

## 2022-04-30 RX ADMIN — METOPROLOL TARTRATE 25 MG: 50 TABLET, FILM COATED ORAL at 08:41

## 2022-04-30 RX ADMIN — PRAVASTATIN SODIUM 20 MG: 20 TABLET ORAL at 08:41

## 2022-04-30 RX ADMIN — LAMOTRIGINE 25 MG: 25 TABLET ORAL at 08:42

## 2022-04-30 ASSESSMENT — ENCOUNTER SYMPTOMS
PHOTOPHOBIA: 0
VOICE CHANGE: 0
ABDOMINAL PAIN: 0
TROUBLE SWALLOWING: 0
VOMITING: 0
WHEEZING: 0
NAUSEA: 0
BACK PAIN: 0
COLOR CHANGE: 0
SHORTNESS OF BREATH: 0
CHEST TIGHTNESS: 0

## 2022-04-30 ASSESSMENT — PAIN DESCRIPTION - LOCATION
LOCATION: HEAD;BACK
LOCATION: BACK;HEAD

## 2022-04-30 ASSESSMENT — PAIN SCALES - GENERAL
PAINLEVEL_OUTOF10: 5
PAINLEVEL_OUTOF10: 7
PAINLEVEL_OUTOF10: 7

## 2022-04-30 ASSESSMENT — PAIN DESCRIPTION - ORIENTATION
ORIENTATION: POSTERIOR
ORIENTATION: LOWER;POSTERIOR

## 2022-04-30 ASSESSMENT — PAIN DESCRIPTION - DESCRIPTORS
DESCRIPTORS: ACHING;POUNDING
DESCRIPTORS: ACHING;POUNDING

## 2022-04-30 NOTE — CONSULTS
CONSULTING SERVICE: Otolaryngology-Head and Neck Surgery    REASON FOR CONSULT:  Jocy Norton is a 45 y.o. female seen today for   Chief Complaint   Patient presents with    Seizures       HISTORY OF PRESENT ILLNESS:   Patient is a 46 y/o female admitted for presumed seizure. H/o CT scan on 4/27/2022 showing R mastoid opacification and R middle ear opacification. Had a MRI brain scan showing similar otomastoiditis on 4/28/2022     On questioning, H/o T&A at 14years old and revision adenoidectomy around 25years of age. H/o FESS x1-2 as well as possible septoplasty in her 35s. H/o BTI x2. Most recent set of ear tubes placed 2 years ago by local ENT in [place]. Last used floxin drops to the R ear about 1 year ago. Does not have any floxin drops at this time. Currently complains R mastoid slightly tender. Has had muffled hearing in the R ear x 1 month and otorrhea for the past 1 week. Had a recent sinus infection as well. H/o asthma and gastroparesis, fibromyalgia, depression. Had a negative heart cath in the past.     REVIEW OF SYSTEMS:   General ROS: negative  Psychological ROS: negative  Ophthalmic ROS: negative  ENT ROS: negative  Allergy and Immunology ROS: negative  Hematological and Lymphatic ROS: negative  Endocrine ROS: negative  Respiratory ROS: asthma  Cardiovascular ROS: as above  Gastrointestinal ROS: \"no abdominal pain, diarrhea, tarry stools, change in bowel habit  Musculoskeletal ROS: negative    PAST HISTORY:   Past Medical History:   Diagnosis Date    Asthma     Depression     ESBL (extended spectrum beta-lactamase) producing bacteria infection 8/8/20214    E.  Coli urine    Fibromyalgia     Gastroparesis     Headache     Hyperlipidemia     Hypertension     Neuropathy     Osteoarthritis     Seizure ([address] 4/27/2022    Tennis elbow     right     Past Surgical History:   Procedure Laterality Date    ADENOIDECTOMY      BREAST SURGERY      CARDIAC CATHETERIZATION  7-    COSMETIC SURGERY      nose    HYSTERECTOMY      KNEE SURGERY      SINUS SURGERY      TONSILLECTOMY       Family History   Problem Relation Age of Onset    Cancer Mother         breast and bone    Heart Disease Father     Diabetes Sister     High Blood Pressure Sister     Diabetes Brother     High Blood Pressure Brother     Heart Disease Maternal Grandmother     Cancer Maternal Grandmother         breast    Cancer Paternal Grandmother     Heart Disease Paternal Grandfather          Social Connections:     Frequency of Communication with Friends and Family: Not on file    Frequency of Social Gatherings with Friends and Family: Not on file    Attends Restoration Services: Not on file    Active Member of Clubs or Organizations: Not on file    Attends Club or Organization Meetings: Not on file    Marital Status: Not on file        MEDICATIONS:   Current Facility-Administered Medications   Medication Dose Route Frequency Provider Last Rate Last Admin    amoxicillin-clavulanate (AUGMENTIN) 875-125 MG per tablet 1 tablet  1 tablet Oral 2 times per day Mitch Charles MD   1 tablet at 04/30/22 0842    prochlorperazine (COMPAZINE) injection 10 mg  10 mg IntraVENous Q6H PRN Eldridge Mcburney, MD        lamoTRIgine (LAMICTAL) tablet 25 mg  25 mg Oral Daily Eldridge Mcburney, MD   25 mg at 04/30/22 0842    citalopram (CELEXA) tablet 40 mg  40 mg Oral Daily Mitch Charles MD   40 mg at 04/30/22 0842    losartan (COZAAR) tablet 100 mg  100 mg Oral Daily Mitch Charles MD   100 mg at 04/30/22 0842    metoprolol tartrate (LOPRESSOR) tablet 25 mg  25 mg Oral BID Mitch Charles MD   25 mg at 04/30/22 0841    pravastatin (PRAVACHOL) tablet 20 mg  20 mg Oral Daily Mitch Charles MD   20 mg at 04/30/22 0841    sodium chloride flush 0.9 % injection 5-40 mL  5-40 mL IntraVENous 2 times per day Mitch Charles MD   10 mL at 04/30/22 0843    sodium chloride flush 0.9 % injection 5-40 mL 5-40 mL IntraVENous PRN Thomas Aquino MD        0.9 % sodium chloride infusion   IntraVENous PRN Thomas Aquino MD        LORazepam (ATIVAN) injection 1 mg  1 mg IntraVENous Q5 Min PRN Thomas Aquino MD        enoxaparin Sodium (LOVENOX) injection 30 mg  30 mg SubCUTAneous BID Thomas Aquino MD   30 mg at 04/30/22 0847    ondansetron (ZOFRAN-ODT) disintegrating tablet 4 mg  4 mg Oral Q8H PRN Thomas Aquino MD        Or    ondansetron (ZOFRAN) injection 4 mg  4 mg IntraVENous Q6H PRN Thomas Aquino MD   4 mg at 04/29/22 1107    polyethylene glycol (GLYCOLAX) packet 17 g  17 g Oral Daily PRN Thomas Aquino MD            ALLERGIES:   Allergies   Allergen Reactions    Latex Rash    Motrin [Ibuprofen]     Norco [Hydrocodone-Acetaminophen] Itching    Tramadol     Naproxen Nausea And Vomiting    Tylenol [Acetaminophen] Nausea And Vomiting     Pt stomachs medication makes her stomach burn        PHYSICAL EXAM:  Vitals:    04/30/22 0101 04/30/22 0440 04/30/22 0808 04/30/22 0841   BP: (!) 145/69 132/63 (!) 141/61 (!) 141/61   Pulse: 54 56 57 60   Resp: 21 17 9    Temp: 98 °F (36.7 °C) 97.7 °F (36.5 °C) 98.2 °F (36.8 °C)    TempSrc: Temporal Temporal Oral    SpO2:   96%    Weight:       Height:          GENERAL: well developed and well nourished and in no acute distress  HEAD: normocephalic and atraumatic  EYES: no eyelid swelling, no conjunctival injection or exudate, pupils equal round and reactive to light  EXTERNAL EARS: normal    EAR EXAM:  L ear:  Normal EAC, auricle, and TM. TM with some calcific myringosclerosis present but mobile TM on pneumatic otoscopy. R ear:  Normal auricle, EAC. TM with green T-tube present with otorrhea draining through it. R mastoid slightly tender but not erythematous.   R auricle not proptotic.      normal TMs bilaterally and normal canals bilaterally  NOSE: nares patent, normal mucosa  MOUTH/THROAT: mucous membranes moist, no focal lesions, tonsils absent  NECK: non-tender, full range of motion, no mass, no focal lymphadenopathy  RESPIRATORY: Normal expansion. Clear to auscultation. No rales, rhonchi, or wheezing. NEUROLOGICAL:  cranial nerves II-XII are grossly intact    RELEVANT LABS/STUDIES:  CT head 4/27/2022       No acute intracranial abnormality. No acute territorial infarction,   intracranial hemorrhage or mass lesion.       Mild mucosal thickening of the maxillary sinuses and ethmoidal air cells.       Severe opacification of right middle ear cavity and right mastoid air cell   suggestive of right-sided otomastoiditis. On my review, multiple sinus air cells removed and has R maxillary sinus mucus-retention cyst along the floor. MRI brain 4/28/2022:       No acute intracranial abnormality.       Chronic sinusitis involving the left maxillary sinus.       Polyp versus mucous retention cyst in the right maxillary sinus.       Fluid in the right mastoid air cells representing simple effusion versus a   degree mastoiditis. IMPRESSION:  R subacute otomastoiditis, recent sinusitis-- suspect recent viral URI contributing to both. R otomastoiditis is non-fluctuant on CT head exam.       S/p BTI x2, s/p T&A, revision adenoidectomy, FESS, septoplasty in past    RECOMMENDATIONS/PLAN:  For R otomastoiditis with R otorrhea, non-surgical management that includes starting floxin drops 5-10 drops BID R ear x 7-10 days and then stop. Dry R ear precautions until otorrhea resolves.     After hospital discharge, should f/u with local ENT (who placed her ear tubes) for additional otologic management and f/u of her R ear tube and any persistent R otorrhea that needs additional outpatient management.         --------------------------------------------------  Mariya Knowles MD  Pediatric Otolaryngology-Head and Neck Surgery  [address] [place] [address] Otolaryngology group    Office  ph# [phone]    Also available in [employer]

## 2022-04-30 NOTE — PROGRESS NOTES
Discharge instructions reviewed with pt, verbalized understanding. Pt requesting work release letter, primary informed. No further needs. Electronically signed by Tara Angel RN on 4/30/2022 at 2:29 PM

## 2022-04-30 NOTE — FLOWSHEET NOTE
SPIRITUAL CARE DEPARTMENT - [address]  PROGRESS NOTE    Shift date: 4/30/22  Shift day: Saturday   Shift # 1    Room # 3833/8926-05   Name: Zoila Medley            Age: 45 y.o. Gender: female          Yarsanism: [address] [place] [address] of Quaker: Unknown    Referral: Routine Visit    Admit Date & Time: 4/27/2022  6:24 PM    PATIENT/EVENT DESCRIPTION:  Zoila Medley is a 45 y.o. female in room 5 of . Patient had seizure. SPIRITUAL ASSESSMENT/INTERVENTION:   responded to spiritual consult request and provided listening ear, compassion, empathy, and prayers as needed for the patient while family looked on.  also dropped off prayer cards and [facility] prayer for the patient. Patient and family were extremely grateful for the visitation and the prayers. SPIRITUAL CARE FOLLOW-UP PLAN:  Chaplains will remain available to offer spiritual and emotional support as needed. Electronically signed by Tia Harding Resident, on 4/30/2022 at 12:47 PM.  [facility]  [phone]       04/30/22 1244   Encounter Summary   Encounter Overview/Reason  Initial Encounter   Service Provided For: Patient; Family   Referral/Consult From: Nurse   Support System Family members; Children;Parent   Last Encounter  04/30/22   Complexity of Encounter Moderate   Begin Time 1234   End Time  1240   Total Time Calculated 6 min   Encounter    Type Initial Screen/Assessment   Spiritual/Emotional needs   Type Emotional Distress   Assessment/Intervention/Outcome   Assessment Calm;Coping; Hopeful;Peaceful   Intervention Active listening;Discussed belief system/Worship practices/ade; Explored/Affirmed feelings, thoughts, concerns;Prayer (assurance of)/[place];Sustaining Presence/Ministry of presence   Outcome Acceptance;Comfort; Connection/Belonging;Coping;Encouraged;Peace; Receptive;Venting emotion   Plan and Referrals   Plan/Referrals No future visits requested

## 2022-04-30 NOTE — PROGRESS NOTES
[id number] [facility]   [address] [place] [address]    Progress Note             Date:   4/30/2022  Patient name:  Eldon Dexter  Date of admission:  4/27/2022  6:24 PM  MRN:   [id number]  Account:  [de-identified]  YOB: 1970  PCP:    Gregg Falcon DO  Room:   [address] [place] [address]  Code Status:    Full Code    Chief Complaint:     Chief Complaint   Patient presents with    Seizures         Brief History of Present Illness:     45 y.o. female with H/O seizures however seizure-free for the past 10 years, HLD, HTN, who was admitted on 4/27/2022 with breakthrough seizure. Patient reports that she was at work and felt a little bit dizzy with a similar presentation of how she used to feel before she would have a seizure. She then had a seizure with loss of consciousness, witnessed by her coworkers. Upon arrival to the ED she had another 1 minute seizure, was loaded with IV Keppra 2 g and given a total of 4 mg of Versed. She reports she is supposed to be taking Depakote but was unable to afford it. MRI brain is normal.  EEG shows left temporal sharp waves. She was started on Lamictal 25 mg a day increasing by 25 mg on a weekly basis until she reaches the target dose of 100 mg twice daily. 4/29: Extensive discussion with patient and patient's family at the bedside. Difficulty discharging the patient as family was adamant that patient did not want to be discharged home today as she did not had any enough physical therapy despite walking RN 10 feet today with physical therapy. Family reports they are unable to stay with the patient and she does not desire further therapy services she does not have insurance. 4/30: Overnight patient complained of significant ear pain. MRI reviewed showed right mastoiditis and patient started on Augmentin. Plan to continue on discharge with outpatient follow-up with PCP. Vitally stable. Afebrile.   Complains of headache overnight although neurovascular wise stable. Toradol 30 mg IV given once. Plan to discharge patient today. On evaluation patient complain of right ear pain. ENT was consulted. Plan to discharge today if patient stable. ENT recommended floxin ottic for 10 days and outpatient followup. Past Medical History:     Past Medical History:   Diagnosis Date    Asthma     Depression     ESBL (extended spectrum beta-lactamase) producing bacteria infection 8/8/20214    E. Coli urine    Fibromyalgia     Gastroparesis     Headache     Hyperlipidemia     Hypertension     Neuropathy     Osteoarthritis     Seizure ([address] 4/27/2022    Tennis elbow     right        Past Surgical History:     Past Surgical History:   Procedure Laterality Date    ADENOIDECTOMY      BREAST SURGERY      CARDIAC CATHETERIZATION  7-    COSMETIC SURGERY      nose    HYSTERECTOMY      KNEE SURGERY      SINUS SURGERY      TONSILLECTOMY          Medications Prior to Admission:     Prior to Admission medications    Medication Sig Start Date End Date Taking? Authorizing Provider   ofloxacin (FLOXIN OTIC) 0.3 % otic solution Place 5 drops into the right ear 2 times daily for 10 days 4/30/22 5/10/22 Yes Mary Jo Salter MD   metoprolol tartrate (LOPRESSOR) 25 MG tablet Take 1 tablet by mouth 2 times daily 4/29/22  Yes ANNA Penaloza CNP   lamoTRIgine (LAMICTAL) 25 MG tablet Weekly titation: Week 1: 25mg QAM Week 2: 25mg BID Week 3: 25mg QAM, 50mg QPM Week 4: 50mg BID Week 5: 50mg QAM, 75mg QPM Week 6: 75mg BID Week 7: 75mg QAM, 100mg QPM Week 8: 100mg BID 4/29/22  Yes ANNA Penaloza CNP   lidocaine (LIDODERM) 5 % Place 1 patch onto the skin daily 12 hours on, 12 hours off.   Patient not taking: Reported on 4/28/2022 11/16/19   ANNA Romero CNP   cyclobenzaprine (FLEXERIL) 10 MG tablet Take 1 tablet by mouth 3 times daily as needed for Muscle spasms  Patient not taking: Reported on 4/28/2022 11/15/19   Gerald Keller MD benzocaine-menthol (CEPACOL SORE THROAT) 15-3.6 MG lozenge Take 1 lozenge by mouth every 2 hours as needed for Sore Throat 9/18/19   Ole Sánchez PA-C   diclofenac sodium 1 % GEL Apply 2 g topically 2 times daily 4/20/19   Yamilex Escobar, DO   Elastic Bandages & Supports (KNEE BRACE ADJUSTABLE HINGED) MISC 1 Device by Does not apply route as needed (comfort) 12/19/18   Nghia Oseguera MD   AMITRIPTYLINE HCL PO Take by mouth  Patient not taking: Reported on 4/28/2022    Historical Provider, MD   dicyclomine (BENTYL) 10 MG capsule Take 1 capsule by mouth every 6 hours as needed (cramps)  Patient not taking: Reported on 4/28/2022 5/26/18   Jimmy Wolfe DO   Misc. Devices (WRIST BRACE) MISC 1 Device by Does not apply route daily One brace to be worn at night and daily as needed for carpal tunnel syndrome. Brace should place the wrist in neutral. 2/20/17   Enrique Storm,    EPINEPHrine (EPIPEN) 0.3 MG/0.3ML SOAJ injection Inject 0.3 mg into the muscle as needed Use as directed for allergic reaction    Historical Provider, MD   Elastic Bandages & Supports ([address]) MISC 1 Device by Does not apply route as needed 6/2/15   Alejandro Gee MD   pravastatin (PRAVACHOL) 20 MG tablet Take 20 mg by mouth daily. Patient not taking: Reported on 4/28/2022    Historical Provider, MD   ondansetron (ZOFRAN) 4 MG tablet Take 1 tablet by mouth every 8 hours as needed for Nausea. Patient not taking: Reported on 4/28/2022 8/8/14   Rubbie Klinefelter A Mathios,    indomethacin (INDOCIN) 50 MG capsule Take 1 capsule by mouth 3 times daily (with meals). Patient not taking: Reported on 4/28/2022 7/26/14   Kala Lawson MD   citalopram (CELEXA) 20 MG tablet Take 40 mg by mouth daily. Patient not taking: Reported on 4/28/2022    Historical Provider, MD   losartan (COZAAR) 50 MG tablet Take 100 mg by mouth daily.   Patient not taking: Reported on 4/28/2022    Historical Provider, MD        Allergies:     Latex, Motrin [ibuprofen], Norco [hydrocodone-acetaminophen], Tramadol, Naproxen, and Tylenol [acetaminophen]    Social History:     Tobacco:    reports that she has never smoked. She has never used smokeless tobacco.  Alcohol:      reports current alcohol use. Drug Use:  reports current drug use. Drug: Marijuana [employer]). Family History:     Family History   Problem Relation Age of Onset    Cancer Mother         breast and bone    Heart Disease Father     Diabetes Sister     High Blood Pressure Sister     Diabetes Brother     High Blood Pressure Brother     Heart Disease Maternal Grandmother     Cancer Maternal Grandmother         breast    Cancer Paternal Grandmother     Heart Disease Paternal Grandfather        Review of Systems:     Review of Systems   Constitutional: Positive for fatigue. HENT: Positive for ear pain. Negative for dental problem, drooling, ear discharge, hearing loss, tinnitus, trouble swallowing and voice change. Eyes: Negative for photophobia and visual disturbance. Respiratory: Negative for chest tightness, shortness of breath and wheezing. Cardiovascular: Negative for chest pain, palpitations and leg swelling. Gastrointestinal: Negative for abdominal pain, nausea and vomiting. Endocrine: Negative for polyuria. Genitourinary: Negative for decreased urine volume, dyspareunia, dysuria and vaginal pain. Musculoskeletal: Negative for arthralgias, back pain and neck stiffness. Skin: Negative for color change and wound. Neurological: Positive for seizures. Negative for facial asymmetry and headaches. Hematological: Negative for adenopathy. Psychiatric/Behavioral: Negative for decreased concentration.        Physical Exam:   BP (!) 141/65   Pulse 57   Temp 98.5 °F (36.9 °C) (Oral)   Resp 15   Ht 5' 8\" (1.727 m)   Wt 260 lb (117.9 kg)   SpO2 99%   BMI 39.53 kg/m²   Temp (24hrs), Av °F (36.7 °C), Min:97.7 °F (36.5 °C), Max:98.5 °F (36.9 °C)    Recent Labs 22  1932 22  2305   POCGLU 91 85       Intake/Output Summary (Last 24 hours) at 2022 1300  Last data filed at 2022 0907  Gross per 24 hour   Intake 890 ml   Output --   Net 890 ml         Neurologic Exam     Mental Status   Oriented to person, place, and time. Level of consciousness: alert  Anxious. Cranial Nerves   Cranial nerves II through XII intact. Motor Exam   Muscle bulk: normal  Right arm pronator drift: absent  Left arm pronator drift: absent    Strength   Right neck flexion: 4/5  Left neck flexion: 4/5  Right neck extension: 4/5  Left neck extension: 4/5  Right deltoid: 4/5  Left deltoid: 4/5  Right biceps: 4/5  Left biceps: 4/5  Right triceps: 4/5  Left triceps: 4/5  Right wrist flexion: 4/5  Left wrist flexion: 4/5  Right wrist extension: 4/5  Left wrist extension: 4/5  Right interossei: 4/5  Left interossei: 4/5  Right abdominals: 4/5  Left abdominals: 4/5  Right iliopsoas: 4/5  Left iliopsoas: 4/5  Right quadriceps: 4/5  Left quadriceps: 4/5  Right hamstrin/5  Left hamstrin/5  Right glutei: 4/5  Left glutei: 4/5  Right anterior tibial: 4/5  Left anterior tibial: 4/5  Right posterior tibial: 4/5  Left posterior tibial: 4/5  Right peroneal: 4/5  Left peroneal: 4/5  Right gastroc: 4/5  Left gastroc: 4/5    Sensory Exam   Light touch normal.     Gait, Coordination, and Reflexes     Coordination   Finger to nose coordination: normal  Heel to shin coordination: normal    Reflexes   Right patellar: 2+  Left patellar: 2+  Right achilles: 2+  Left achilles: 2+         Investigations:      Laboratory Testing:  No results found for this or any previous visit (from the past 24 hour(s)).   Recent Labs     22   WBC 5.5   RBC 5.35*   HGB 12.2   HCT 41.5   MCV 77.6*   MCH 22.8*   MCHC 29.4   RDW 14.9*      MPV 11.5     Recent Labs     22      K 3.6*      CO2 22   BUN 7   CREATININE 0.64   GLUCOSE 96   CALCIUM 9.1   PROT 7.5   LABALBU 4.0 BILITOT 0.27*   ALKPHOS 69   AST 17   ALT 11     Hemoglobin A1C   Date Value Ref Range Status   04/28/2022 5.2 4.0 - 6.0 % Final       Assessment :      Primary Problem  Seizure [facility])    Active Hospital Problems    Diagnosis Date Noted    Seizure [facility]) [R56.9] 04/27/2022     Priority: Medium         Impression:  -Breakthrough seizure  -History of epilepsy but has been seizure-free for the past 10 years     Plan:  -Initiated on Lamictal 25 mg a day and increasing by 25 mg on a weekly basis until she reaches the target dose of 100 mg twice daily  -Toradol and Zofran x1 for headache; patient in agreement and states her allergy only causes upset stomach. ENT consulted for right ear pain: recommended floxin ottic for 10 days and outpatient follow-up. -Avoid excessive alcohol use, obtain adequate sleep  -No driving or operating heavy machinery until cleared by the outpatient neurology office. Avoid open water, fire, and climbing to high heights.  -Neurologically clear. Will plan for discharge today. Follow-up in the clinic in 6 weeks. Follow-up further recommendations after discussing the case with attending  The plan was discussed with the patient, patient's family and the medical staff. Consultations:   IP CONSULT TO NEUROLOGY  IP CONSULT TO OTOLARYNGOLOGY  IP CONSULT TO Brian    Patient is admitted as inpatient status because of co-morbidities listed above, severity of signs and symptoms as outlined, requirement for current medical therapies and most importantly because of direct risk to patient if care not provided in a hospital setting.     Tex Knight MD  4/30/2022  1:00 PM    Copy sent to Dr. Janet Abarca DO

## 2022-04-30 NOTE — PLAN OF CARE
Problem: Discharge Planning  Goal: Discharge to home or other facility with appropriate resources  4/30/2022 0548 by Brenton Beth RN  Outcome: Progressing       Problem: Pain  Goal: Verbalizes/displays adequate comfort level or baseline comfort level  4/30/2022 0548 by Brenton Beth RN  Outcome: Progressing       Problem: ABCDS Injury Assessment  Goal: Absence of physical injury  4/30/2022 0548 by Brenton Beth RN  Outcome: Progressing

## 2022-05-06 ENCOUNTER — OFFICE VISIT (OUTPATIENT)
Dept: FAMILY MEDICINE CLINIC | Age: 52
End: 2022-05-06

## 2022-05-06 VITALS
BODY MASS INDEX: 38.71 KG/M2 | HEIGHT: 68 IN | DIASTOLIC BLOOD PRESSURE: 77 MMHG | TEMPERATURE: 97 F | HEART RATE: 69 BPM | WEIGHT: 255.4 LBS | SYSTOLIC BLOOD PRESSURE: 120 MMHG

## 2022-05-06 DIAGNOSIS — R56.9 SEIZURE (HCC): ICD-10-CM

## 2022-05-06 DIAGNOSIS — H70.90 MASTOIDITIS, UNSPECIFIED LATERALITY: Primary | ICD-10-CM

## 2022-05-06 DIAGNOSIS — Z65.8 INADEQUATE SOCIAL SUPPORT: ICD-10-CM

## 2022-05-06 PROCEDURE — 99212 OFFICE O/P EST SF 10 MIN: CPT

## 2022-05-06 RX ORDER — AMOXICILLIN AND CLAVULANATE POTASSIUM 875; 125 MG/1; MG/1
1 TABLET, FILM COATED ORAL 2 TIMES DAILY
Qty: 20 TABLET | Refills: 0 | Status: SHIPPED | OUTPATIENT
Start: 2022-05-06 | End: 2022-05-16

## 2022-05-06 RX ORDER — LOSARTAN POTASSIUM 50 MG/1
50 TABLET ORAL DAILY
Qty: 30 TABLET | Refills: 5 | Status: SHIPPED | OUTPATIENT
Start: 2022-05-06 | End: 2022-06-10

## 2022-05-06 ASSESSMENT — PATIENT HEALTH QUESTIONNAIRE - PHQ9
9. THOUGHTS THAT YOU WOULD BE BETTER OFF DEAD, OR OF HURTING YOURSELF: 0
4. FEELING TIRED OR HAVING LITTLE ENERGY: 3
SUM OF ALL RESPONSES TO PHQ QUESTIONS 1-9: 13
7. TROUBLE CONCENTRATING ON THINGS, SUCH AS READING THE NEWSPAPER OR WATCHING TELEVISION: 0
1. LITTLE INTEREST OR PLEASURE IN DOING THINGS: 2
6. FEELING BAD ABOUT YOURSELF - OR THAT YOU ARE A FAILURE OR HAVE LET YOURSELF OR YOUR FAMILY DOWN: 0
SUM OF ALL RESPONSES TO PHQ QUESTIONS 1-9: 13
2. FEELING DOWN, DEPRESSED OR HOPELESS: 2
SUM OF ALL RESPONSES TO PHQ QUESTIONS 1-9: 13
SUM OF ALL RESPONSES TO PHQ QUESTIONS 1-9: 13
8. MOVING OR SPEAKING SO SLOWLY THAT OTHER PEOPLE COULD HAVE NOTICED. OR THE OPPOSITE, BEING SO FIGETY OR RESTLESS THAT YOU HAVE BEEN MOVING AROUND A LOT MORE THAN USUAL: 0
5. POOR APPETITE OR OVEREATING: 3
SUM OF ALL RESPONSES TO PHQ9 QUESTIONS 1 & 2: 4
10. IF YOU CHECKED OFF ANY PROBLEMS, HOW DIFFICULT HAVE THESE PROBLEMS MADE IT FOR YOU TO DO YOUR WORK, TAKE CARE OF THINGS AT HOME, OR GET ALONG WITH OTHER PEOPLE: 1
3. TROUBLE FALLING OR STAYING ASLEEP: 3

## 2022-05-06 ASSESSMENT — ENCOUNTER SYMPTOMS: SHORTNESS OF BREATH: 0

## 2022-05-06 NOTE — PROGRESS NOTES
Subjective:    Khang [place] is a 45 y.o. 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 Presented to the office today for:  Chief Complaint   Patient presents with    Follow-Up from Hospital     seizures   [facility] Patient     est care       HPI  This is 45years old female presented to the office today to establish care. Patient was recently admitted at [facility] for seizures, she was previously on depakote which she discontinued as it was not covered by insurance, she was discharged on lamotrigine 25 mg to be increased 25 mg weekly until reaching 100 mg daily.  -Patient reported no more seizures since discharge  -She has episodes of facial twitching and abnormal leg movements. Mastoiditis  -On MRI done on her recent admission showed features suggestive of mastoiditis, she was discharged on topical ear antibiotics however she still has pain and discomfort of the right ear. Review of Systems   Respiratory: Negative for shortness of breath. Cardiovascular: Negative for chest pain, palpitations and leg swelling. Neurological: Positive for seizures (Breakthrough seizures). Negative for tremors, weakness, light-headedness, numbness and headaches.         Facial twitching  Abnormal leg movement                 The patient has a   Family History   Problem Relation Age of Onset    Cancer Mother         breast and bone    Heart Disease Father     Diabetes Sister     High Blood Pressure Sister     Diabetes Brother     High Blood Pressure Brother     Heart Disease Maternal Grandmother     Cancer Maternal Grandmother         breast    Cancer Paternal Grandmother     Heart Disease Paternal Grandfather        Objective:    /77 (Site: Right Upper Arm, Position: Sitting, Cuff Size: Large Adult) Comment: machine  Pulse 69   Temp 97 °F (36.1 °C)   Ht 5' 8\" (1.727 m)   Wt 255 lb 6.4 oz (115.8 kg)   BMI 38.83 kg/m²    BP Readings from Last 3 Encounters:   05/06/22 120/77   04/30/22 (!) 141/65   10/26/21 120/67       Physical Exam  Constitutional:       Appearance: Normal appearance. HENT:      Head:      Comments: Facial twitching noticed on the left side of the face during the visit  Neurological:      Mental Status: She is alert and oriented to person, place, and time. Cranial Nerves: No cranial nerve deficit. Sensory: No sensory deficit. Motor: No weakness. Lab Results   Component Value Date    WBC 5.5 04/27/2022    HGB 12.2 04/27/2022    HCT 41.5 04/27/2022     04/27/2022    CHOL 195 04/28/2022    TRIG 84 04/28/2022    HDL 56 04/28/2022    ALT 11 04/27/2022    AST 17 04/27/2022     04/27/2022    K 3.6 (L) 04/27/2022     04/27/2022    CREATININE 0.64 04/27/2022    BUN 7 04/27/2022    CO2 22 04/27/2022    TSH 3.23 06/29/2018    INR 0.9 05/17/2016    LABA1C 5.2 04/28/2022     Lab Results   Component Value Date    CALCIUM 9.1 04/27/2022    PHOS 4.1 03/31/2012     Lab Results   Component Value Date    LDLCHOLESTEROL 122 04/28/2022       Assessment and Plan:    1. Mastoiditis, unspecified laterality  -We will give course of oral Augmentin for 10 days.  -Counseled patient on complications of mastoiditis and concerning symptoms which needs emergent medical attention, patient voices understanding and agreement. 2. Seizure ([address]  -Continue lamotrigine  -Patient has a follow-up appointment with a neurologist in June    3.  Inadequate social support  - [facility] Referral for Social Determinants of Health          Requested Prescriptions     Signed Prescriptions Disp Refills    losartan (COZAAR) 50 MG tablet 30 tablet 5     Sig: Take 1 tablet by mouth daily    amoxicillin-clavulanate (AUGMENTIN) 875-125 MG per tablet 20 tablet 0     Sig: Take 1 tablet by mouth 2 times daily for 10 days       Medications Discontinued During This Encounter   Medication Reason    losartan (COZAAR) 50 MG tablet Rodney Gasca received counseling on the following healthy behaviors: nutrition, exercise and medication adherence    Discussed use,benefit, and side effects of prescribed medications. Barriers to medication compliance addressed. All patient questions answered. Pt voiced understanding. Return in about 1 month (around 6/6/2022) for seizures. Disclaimer: Some orall of this note was transcribed using voice-recognition software. This may cause typographical errors occasionally. Although all effort is made to fix these errors, please do not hesitate to contact our office if there Yvette David concern with the understanding of this note.

## 2022-05-06 NOTE — PROGRESS NOTES
HYPERTENSION visit     BP Readings from Last 3 Encounters:   05/06/22 120/77   04/30/22 (!) 141/65   10/26/21 120/67       LDL Cholesterol (mg/dL)   Date Value   04/28/2022 122     HDL (mg/dL)   Date Value   04/28/2022 56     BUN (mg/dL)   Date Value   04/27/2022 7     CREATININE (mg/dL)   Date Value   04/27/2022 0.64     Glucose (mg/dL)   Date Value   04/27/2022 96   04/01/2012 133 (H)              Have you changed or started any medications since your last visit including any over-the-counter medicines, vitamins, or herbal medicines? no   Have you stopped taking any of your medications? Is so, why? -  no  Are you having any side effects from any of your medications? - no  How often do you miss doses of your medication? no      Have you seen any other physician or provider since your last visit?  no   Have you had any other diagnostic tests since your last visit? yes - EEG/ LABS/ MRI BRAIN   Have you been seen in the emergency room and/or had an admission in a hospital since we last saw you?  yes - [facility]   Have you had your routine dental cleaning in the past 6 months?  no     Do you have an active MyChart account? If no, what is the barrier?   No: PENDING    Patient Care Team:  Abeba Mckenzie MD as PCP - General (Family Medicine)    Medical History Review  Past Medical, Family, and Social History reviewed and does not contribute to the patient presenting condition    Health Maintenance   Topic Date Due    COVID-19 Vaccine (1) Never done    Pneumococcal 0-64 years Vaccine (1 - PCV) Never done    Depression Monitoring  Never done    HIV screen  Never done    Hepatitis C screen  Never done    DTaP/Tdap/Td vaccine (1 - Tdap) Never done    Colorectal Cancer Screen  Never done    Shingles vaccine (1 of 2) Never done    Breast cancer screen  11/22/2018    Flu vaccine (Season Ended) 09/01/2022    Potassium  04/27/2023    Creatinine  04/27/2023    Lipids  04/28/2023    Hepatitis A vaccine  Aged Out  Hepatitis B vaccine  Aged Out    Hib vaccine  Aged Out    Meningococcal (ACWY) vaccine  Aged Out

## 2022-05-06 NOTE — PROGRESS NOTES
I have reviewed and discussed key elements of [address] with the resident including plan of care and follow up and agree with the care segun plan. Past Medical History:   Diagnosis Date    Asthma     Depression     ESBL (extended spectrum beta-lactamase) producing bacteria infection 8/8/20214    E. Coli urine    Fibromyalgia     Gastroparesis     Headache     Hyperlipidemia     Hypertension     Neuropathy     Osteoarthritis     Se[address] [address] [facility] 4/27/2022    Tennis elbow     right       Follow up after admission for seizure. Had stopped seizure medication due to expense of medication. Work up also revealed mastoiditis.

## 2022-05-09 ENCOUNTER — TELEPHONE (OUTPATIENT)
Dept: FAMILY MEDICINE CLINIC | Age: 52
End: 2022-05-09

## 2022-05-09 NOTE — TELEPHONE ENCOUNTER
Anthony Nascimento was contacted  by writer to discuss referral for SDOH related needs. Writer spoke with: Patient and explained the services and assistance that can be provided through the patient navigation program.     Patient agreeable to receiving resources and support from [facility]. Discussed the following with the patient:  Financial Resource Strain - Insurance       Needs and background info:     Patient does not have any medical insurance. Was recently inpatient at [facility] for seizures. States Im on all these medications that I cant pay for now. I had to dip into my rent to pay for the medications and now my rent is going to be late.  Also stated that freezer has now stopped working properly. 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 Does have some difficulty affording food - niece does help her out. Monthly payments are rent, utilities, car loan and insurance, cell phone. Noted in patient history that some medications were not being taking d/t cost and no coverage. Was previously on Medicaid however, does not qualify d/t income. Patient believes income will be impacted d/t driving restrictions which is necessary for work. Patient had thought about reapplying for Medicaid in that case. Is not on any assistive programs. Does receive dental/vision through work. Plan of Care: Is acceptable to receiving food pantry list via email preference. With recent income, will not qualify for medicaid. If income changes, we can revisit Medicaid application. Will review other sources for medication et other assistance available      Follow up with patient necessary: yes    Follow up with resource organization necessary: TBD    Patient has given verbal permission to leave detailed messages regarding SDOH referral on their phone. N/A    Patient has given verbal permission to submit applications on their behalf.  TBD    Patient was provided with writer's contact information should they require any additional assistance.        Fiordaliza Lai

## 2022-05-12 ENCOUNTER — TELEPHONE (OUTPATIENT)
Dept: FAMILY MEDICINE CLINIC | Age: 52
End: 2022-05-12

## 2022-05-12 NOTE — TELEPHONE ENCOUNTER
Diana Tino was contacted  by writer to discuss referral for SDOH related needs. Writer spoke with: Patient and explained the services and assistance that can be provided through the patient navigation program.     Patient agreeable to receiving resources and support from [address] [place] [address]. Discussed the following with the patient:  Financial Resource Strain specific to Medical Insurance/Medication assistance and appliance assistance    Needs and background info:  Discussed with patient that current income is over the qualifying limit for Medicaid/Medication Assistance and Food Assistive programs. Did discuss the [employer] program. Patient stated she had a card. Explained that if she were to download the collette, she would be able to search the lowest price/pharmacies. Did discuss that if her income changes with her restrictions, we could revisit the programs. Patient confirmed she has writer's contact information. Plan of Care: Patient plans to download the [employer] collette to use. This writer will continue to search possible appliance availability or repair services. Follow up with patient necessary: yes. Patient stated that email information will be fine. Follow up with resource organization necessary: no    Patient has given verbal permission to leave detailed messages regarding SDOH referral on their phone. N/A    Patient has given verbal permission to submit applications on their behalf. N/A    Patient was provided with writer's contact information should they require any additional assistance.        Sina Mitchell

## 2022-05-20 ENCOUNTER — TELEPHONE (OUTPATIENT)
Dept: FAMILY MEDICINE CLINIC | Age: 52
End: 2022-05-20

## 2022-05-28 NOTE — ED PROVIDER NOTES
on file    Years of education: Not on file    Highest education level: Not on file   Occupational History    Not on file   Social Needs    Financial resource strain: Not on file    Food insecurity     Worry: Not on file     Inability: Not on file    Transportation needs     Medical: Not on file     Non-medical: Not on file   Tobacco Use    Smoking status: Never Smoker    Smokeless tobacco: Never Used   Substance and Sexual Activity    Alcohol use: Yes     Comment: occasionally    Drug use: Yes     Types: Marijuana    Sexual activity: Not on file   Lifestyle    Physical activity     Days per week: Not on file     Minutes per session: Not on file    Stress: Not on file   Relationships    Social connections     Talks on phone: Not on file     Gets together: Not on file     Attends Congregation service: Not on file     Active member of club or organization: Not on file     Attends meetings of clubs or organizations: Not on file     Relationship status: Not on file    Intimate partner violence     Fear of current or ex partner: Not on file     Emotionally abused: Not on file     Physically abused: Not on file     Forced sexual activity: Not on file   Other Topics Concern    Not on file   Social History Narrative    Not on file       I counseled the patient against using tobacco products. Family History   Problem Relation Age of Onset    Cancer Mother         breast and bone    Heart Disease Father     Diabetes Sister     High Blood Pressure Sister     Diabetes Brother     High Blood Pressure Brother     Heart Disease Maternal Grandmother     Cancer Maternal Grandmother         breast    Cancer Paternal Grandmother     Heart Disease Paternal Grandfather      No other pertinent FamHx on review with patient/guardian. Allergies:  Latex; Dye [iodides]; Motrin [ibuprofen];  Norco [hydrocodone-acetaminophen]; Tramadol; Naproxen; and Tylenol [acetaminophen]    Home Medications:  Prior to Admission medications    Medication Sig Start Date End Date Taking? Authorizing Provider   lidocaine (LIDODERM) 5 % Place 1 patch onto the skin daily 12 hours on, 12 hours off. 11/16/19   ANNA Yates - CNP   cyclobenzaprine (FLEXERIL) 10 MG tablet Take 1 tablet by mouth 3 times daily as needed for Muscle spasms 11/15/19   Michaela Acevedo MD   acetaminophen (TYLENOL) 325 MG tablet Take 2 tablets by mouth every 6 hours as needed for Pain 10/15/19   Luis Nagy, DO   benzocaine-menthol (CEPACOL SORE THROAT) 15-3.6 MG lozenge Take 1 lozenge by mouth every 2 hours as needed for Sore Throat 9/18/19   Bao Hartmann PA-C   diclofenac sodium 1 % GEL Apply 2 g topically 2 times daily 4/20/19   Mireya Gilbert MD   Elastic Bandages & Supports (KNEE BRACE ADJUSTABLE HINGED) MISC 1 Device by Does not apply route as needed (comfort) 12/19/18   Lesley Tony MD   AMITRIPTYLINE HCL PO Take by mouth    Historical Provider, MD   dicyclomine (BENTYL) 10 MG capsule Take 1 capsule by mouth every 6 hours as needed (cramps) 5/26/18   Percy Helms, DO   Misc. Devices (WRIST BRACE) MISC 1 Device by Does not apply route daily One brace to be worn at night and daily as needed for carpal tunnel syndrome. Brace should place the wrist in neutral. 2/20/17   Connor Aguilar, DO   EPINEPHrine (EPIPEN) 0.3 MG/0.3ML SOAJ injection Inject 0.3 mg into the muscle as needed Use as directed for allergic reaction    Historical Provider, MD   divalproex (DEPAKOTE) 500 MG DR tablet Take 1 tablet by mouth daily 9/13/16   Cindy Joshua MD   metoprolol tartrate (LOPRESSOR) 50 MG tablet 2 IN AM AND 1 AT NIGHT 4/26/16   Historical Provider, MD   divalproex (DEPAKOTE ER) 500 MG ER tablet Take 1 tablet by mouth nightly 5/20/16   Justine Drake, DO   Elastic Bandages & Supports (TENNIS ELBOW SUPPORT) MISC 1 Device by Does not apply route as needed 6/2/15   Pavel Verdin MD   pravastatin (PRAVACHOL) 20 MG tablet Take 20 mg by mouth daily. 2-point gait

## 2022-06-01 ENCOUNTER — OFFICE VISIT (OUTPATIENT)
Dept: NEUROLOGY | Age: 52
End: 2022-06-01

## 2022-06-01 VITALS
HEIGHT: 68 IN | SYSTOLIC BLOOD PRESSURE: 117 MMHG | DIASTOLIC BLOOD PRESSURE: 79 MMHG | WEIGHT: 261 LBS | HEART RATE: 76 BPM | BODY MASS INDEX: 39.56 KG/M2

## 2022-06-01 DIAGNOSIS — R53.1 RIGHT SIDED WEAKNESS: Primary | ICD-10-CM

## 2022-06-01 PROCEDURE — 99214 OFFICE O/P EST MOD 30 MIN: CPT | Performed by: PSYCHIATRY & NEUROLOGY

## 2022-06-01 RX ORDER — LAMOTRIGINE 100 MG/1
TABLET ORAL
COMMUNITY
Start: 2022-05-25 | End: 2022-07-30 | Stop reason: SDUPTHER

## 2022-06-01 NOTE — PROGRESS NOTES
[facility], [address], [address] [place] [address]  Ph: [phone] or [phone]  FAX: [phone]    Chief Complaint: Seizures     Dear Livier Bowen MD     I had the pleasure of seeing your patient today in neurology consultation for her symptoms. As you would recall Ailin Cha is a 45 y.o. female. The patient presents to the office for continued neurological care. Patient is previously known to me for fluctuating hemiparesis and hemifacial spasm and underlying headaches. She was recently admitted to the hospital on 4/27/2022 due to a breakthrough seizure. Patient was reported to have felt dizzy at work and loss consciousness. At the hospital, the patient's EEG was abnormal. The patient was also initiated on Lamictal, and there have been no breakthrough seizures since hospital discharge. Patient reports that she continues to experience a constant facial twitching and drooping that affects both sides of her face. She also experiences symptoms of restless leg syndrome in her left lower extremity. No change in her symptoms when tritrating up Lamictal. Patient reports that symptoms worsened after taking Reglan for gastroparesis. Admits to driving. Neurological Workup:  MRI Brain WO Contrast on 4/28/2022: No acute intracranial abnormality. Chronic sinusitis involving the left maxillary sinus. Polyp versus mucous retention cyst in the right maxillary sinus. Fluid in the right mastoid air cells representing simple effusion versus a degree mastoiditis. EEG awake and drowsy on 4/28/2022: Abnormal awake EEG. The presence of left temporal sharps increases patient risk for focal seizures with or without secondary generalization. Past Medical History:   Diagnosis Date    Asthma     Depression     ESBL (extended spectrum beta-lactamase) producing bacteria infection 8/8/20214    E.  Coli urine    Fibromyalgia     Gastroparesis     Headache     Hyperlipidemia     Hypertension     Neuropathy     Osteoarthritis     Seizure ([facility] [address] 4/27/2022    Tennis elbow     right     Past Surgical History:   Procedure Laterality Date    ADENOIDECTOMY      BREAST SURGERY      CARDIAC CATHETERIZATION  7-    COSMETIC SURGERY      nose    HYSTERECTOMY      KNEE SURGERY      SINUS SURGERY      TONSILLECTOMY       Allergies   Allergen Reactions    Latex Rash    Motrin [Ibuprofen]     Norco [Hydrocodone-Acetaminophen] Itching    Tramadol     Naproxen Nausea And Vomiting    Tylenol [Acetaminophen] Nausea And Vomiting     Pt stomachs medication makes her stomach burn     Family History   Problem Relation Age of Onset    Cancer Mother         breast and bone    Heart Disease Father     Diabetes Sister     High Blood Pressure Sister     Diabetes Brother     High Blood Pressure Brother     Heart Disease Maternal Grandmother     Cancer Maternal Grandmother         breast    Cancer Paternal Grandmother     Heart Disease Paternal Grandfather       Social History     Socioeconomic History    Marital status: Single     Spouse name: Not on file    Number of children: Not on file    Years of education: Not on file    Highest education level: Not on file   Occupational History    Not on file   Tobacco Use    Smoking status: Never Smoker    Smokeless tobacco: Never Used   Vaping Use    Vaping Use: Never used   Substance and Sexual Activity    Alcohol use: Yes     Comment: occasionally    Drug use: Yes     Types: Marijuana Tierney Kales)    Sexual activity: Not on file   Other Topics Concern    Not on file   Social History Narrative    Barrier to care due to finances and no insurance.      Social Determinants of Health     Financial Resource Strain:     Difficulty of Paying Living Expenses: Not on file   Food Insecurity:     Worried About Running Out of Food in the Last Year: Not on file    Vinnie of Food in the Last Year: Not on [employer] Needs:     Lack of Transportation (Medical): Not on file    Lack of Transportation (Non-Medical): Not on file   Physical Activity:     Days of Exercise per Week: Not on file    Minutes of Exercise per Session: Not on file   Stress:     Feeling of Stress : Not on file   Social Connections:     Frequency of Communication with Friends and Family: Not on file    Frequency of Social Gatherings with Friends and Family: Not on file    Attends Taoism Services: Not on file    Active Member of [address] [place] [address] or Organizations: Not on file    Attends Club or Organization Meetings: Not on file    Marital Status: Not on file   Intimate Partner Violence:     Fear of Current or Ex-Partner: Not on file    Emotionally Abused: Not on file    Physically Abused: Not on file    Sexually Abused: Not on file   Housing Stability:     Unable to Pay for Housing in the Last Year: Not on file    Number of [place] in the Last Year: Not on file    Unstable Housing in the Last Year: Not on file      There were no vitals taken for this visit.       HEENT [] Hearing Loss  [] Visual Disturbance  [] Tinnitus  [] Eye pain   Respiratory [] Shortness of Breath  [] Cough  [] Snoring   Cardiovascular [] Chest Pain  [] Palpitations  [] Lightheaded   GI [] Constipation  [] Diarrhea  [] Swallowing change  [] Nausea/vomiting    [] Urinary Frequency  [] Urinary Urgency   Musculoskeletal [] Neck pain  [] Back pain  [] Muscle pain  [x] Restless legs   Dermatologic [] Skin changes   Neurologic [] Memory loss/confusion  [x] Seizures  [] Trouble walking or imbalance  [] Dizziness  [] Sleep disturbance  [] Weakness  [] Numbness  [] Tremors  [] Speech Difficulty  [] Headaches  [] Light Sensitivity  [] Sound Sensitivity   Endocrinology []Excessive thirst  []Excessive hunger   Psychiatric [] Anxiety/Depression  [] Hallucination   Allergy/immunology []Hives/environmental allergies   Hematologic/lymph [] Abnormal bleeding  [] Abnormal bruising General appearence: Normal. Well groomed. In no acute distress    Head: Normocephalic, atraumatic  Eyes: Extraocular movements intact, eye lids normal  Lungs: Respirations unlabored, chest wall no deformity  ENT: Normal external ear canals, no sinus tenderness  Heart: Regular rate rhythm  Abdomen: No masses, tenderness  Extremities: No cyanosis or edema, 2+ pulses  Musculoskeletal: Normal range of motion in all joints  Skin: Intact, normal skin color    Neurological examination:    Mental status   Alert and oriented; intact memory with no confusion, speech or language problems; no hallucinations or delusions     Cranial nerves   II - visual fields intact to confrontation                                                III, IV, VI - extra-ocular muscles full: no pupillary defect; no CHADD, no nystagmus, no ptosis   V - normal facial sensation                                                               VII - normal facial symmetry                                                             VIII - intact hearing                                                                             IX, X - symmetrical palate                                                                  XI - symmetrical shoulder shrug                                                       XII - midline tongue without atrophy or fasciculation     Motor function  Normal muscle bulk and tone; normal power 5/5, including fine motor movements     Sensory function Intact to touch, pin, vibration, proprioception     Cerebellar Intact fine motor movement. No involuntary movements or tremors     Reflex function Intact 2+ DTR and symmetric.  Negative Babinski     Gait                  Normal station and gait       Lab Results   Component Value Date    LDLCHOLESTEROL 122 04/28/2022     No components found for: CHLPL  Lab Results   Component Value Date    TRIG 84 04/28/2022    TRIG 80 06/29/2018    TRIG 91 11/17/2016     Lab Results   Component Value Date HDL 56 04/28/2022    HDL 51 06/29/2018    HDL 56 11/17/2016     No results found for: LDLCALC  No results found for: LABVLDL  Lab Results   Component Value Date    LABA1C 5.2 04/28/2022     Lab Results   Component Value Date     04/28/2022     Lab Results   Component Value Date    ZFRRBRTE06 240 11/10/2011      Neurological work up:  MRI Brain WO Contrast on 4/28/2022: No acute intracranial abnormality. Chronic sinusitis involving the left maxillary sinus. Polyp versus mucous retention cyst in the right maxillary sinus. Fluid in the right mastoid air cells representing simple effusion versus a degree mastoiditis. EEG awake and drowsy on 4/28/2022: Abnormal awake EEG. The presence of left temporal sharps increases patient risk for focal seizures with or without secondary generalization. All of patient's labs were personally reviewed. All the imaging studies were personally reviewed and discussed with the patient. 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 I recommend she continue Lamictal 100 mg BID for seizure prophylaxis. Seizure and epilepsy safety was also outlined. Specifically, we reviewed that she would not be able to operate a motor vehicle in the state of [place] until she has been seizure free for 6 months. Other restrictions were reviewed including operating heavy or electrical machinery, swimming alone, navigating heights, or engaging in any activity that may be injurious to her or others if there was an unexpected loss of consciousness or loss of function. Fluctuating hemiparesis and hemifacial spasms   Patient would like to hold off on medical intervention at this time, but I advised her to call me if her symptoms become bothersome. We discussed the possibility of initiating Botox injections for treatment.      All medication side effects were discussed and questions answered. Patient to follow up in a year or sooner if symptoms worsen. Myrtha Leventhal, MD I would like to thank you for the consult. Please do not hesitate if you have any questions about the patient care. This note is created with the assistance of a speech-recognition program. While intending to generate a document that actually reflects the content of the visit, the document can still have some errors including those of syntax and sound a- like substitutions which may escape proofreading. In such instances, actual meaning can be extrapolated by contextual derivation. Scribe Attestation:   By signing my name below, Elen Schmitt, attest that this documentation has been prepared under the direction and in the presence of Edgardo Zaman MD.    Electronically Signed: Octaviaon Orourke. 06/01/22. 9:05 AM     I, Malia Esquivel MD, personally performed the services described in this documentation. All medical record entries made by the scribe were at my direction and in my presence. I have reviewed the chart and discharge instructions (if applicable) and agree that the record reflects my personal performance and is accurate and complete.     Electronically Signed: Malia Esquivel 6/5/2022 8:21 PM    Diplomate, American Board of Psychiatry and Neurology  Diplomate, American Board of Clinical Neurophysiology  Diplomate, American Board of Epilepsy

## 2022-06-02 ENCOUNTER — TELEPHONE (OUTPATIENT)
Dept: NEUROLOGY | Age: 52
End: 2022-06-02

## 2022-06-10 ENCOUNTER — TELEMEDICINE (OUTPATIENT)
Dept: FAMILY MEDICINE CLINIC | Age: 52
End: 2022-06-10

## 2022-06-10 DIAGNOSIS — I10 PRIMARY HYPERTENSION: Primary | ICD-10-CM

## 2022-06-10 DIAGNOSIS — G40.109 FOCAL EPILEPSY (HCC): ICD-10-CM

## 2022-06-10 PROCEDURE — 99213 OFFICE O/P EST LOW 20 MIN: CPT

## 2022-06-10 RX ORDER — LOSARTAN POTASSIUM 50 MG/1
50 TABLET ORAL 2 TIMES DAILY
Qty: 30 TABLET | Refills: 5 | Status: SHIPPED | OUTPATIENT
Start: 2022-06-10 | End: 2022-08-03

## 2022-06-10 NOTE — PATIENT INSTRUCTIONS
Thank you for letting us take care of you today. We hope all your questions were addressed. If a question was overlooked or something else comes to mind after you return home, please contact a member of your Care Team listed below. Your Care Team at [place] [address] is Team #4  Ruth Ortiz MD (Faculty)  Lalo Palma MD (Resident)  Greta Soriano MD (Resident)  Sharon Rodríguez MD (Resident)  Too Alvarez MD (Resident)  FANY Rain,MARCUS [place] [facility] office)  Whitney Snow, [address] (Clinical Practice Manager)  Cesar Hart, [facility] (Clinical Pharmacist)       Office phone number: [phone]    If you need to get in right away due to illness, please be advised we have \"Same Day\" appointments available Monday-Friday. Please call us at [phone] option #3 to schedule your \"Same Day\" appointment.

## 2022-06-10 NOTE — PROGRESS NOTES
Attending Physician Statement  I have discussed the care of ReeaWatsonincluding pertinent history and exam findings,  with the resident. I have reviewed the key elements of all parts of the encounter with the resident. I agree with the assessment, plan and orders as documented by the resident. (GE Modifier)    Telephone visits  HTN- Increase Cozaar to 100 mg  Seizure disorder- Continue with Lamotrigine.

## 2022-06-10 NOTE — PROGRESS NOTES
Eldon Dexter is a 45 y.o. female evaluated via telephone on 6/10/2022 for Follow-up (patient states she is still having headache )  . Documentation:  I communicated with the patient and/or health care decision maker about follow-up on chronic medical condition. Details of this discussion including any medical advice provided:     Chief complaint: High blood pressure, follow-up after neurology visit for seizures    HTN:  -Patient is taking Cozaar 50 mg daily  -Reported blood pressure readings  Between 130s over 80s, to 150s over 90s  -Blood pressure checked by the patient this visit is 148/95, HR of 65  -Plan to increase Cozaar frequency to twice daily, dose of 50 mg  -Discussed with pt side effects of the medication and warning signs to seek urgent medical attention     Focal epilepsy:  -Sees Dr. Sharlette Osler neurology, last visit this month recommended continuing lamotrigine 100 mg twice daily  -No side effects from lamotrigine reported by patient    Total Time: minutes: 11-20 minutes    Eldon Dexter was evaluated through a synchronous (real-time) audio encounter. Patient identification was verified at the start of the visit. She (or guardian if applicable) is aware that this is a billable service, which includes applicable co-pays. This visit was conducted with the patient's (and/or legal guardian's) verbal consent. She has not had a related appointment within my department in the past 7 days or scheduled within the next 24 hours. The patient was located at Home: [facility] [address] [place] [address]. The provider was located at [facility] (Appt Dept): [address] [place] [address]     Note: not billable if this call serves to triage the patient into an appointment for the relevant concern    Saúl Fischer MD

## 2022-06-15 NOTE — TELEPHONE ENCOUNTER
Patient has not contacted the office regarding this request from [employer]. Paper form will be destroyed.

## 2022-07-20 ENCOUNTER — OFFICE VISIT (OUTPATIENT)
Dept: FAMILY MEDICINE CLINIC | Age: 52
End: 2022-07-20

## 2022-07-20 VITALS
HEART RATE: 86 BPM | OXYGEN SATURATION: 98 % | HEIGHT: 68 IN | WEIGHT: 254 LBS | BODY MASS INDEX: 38.49 KG/M2 | DIASTOLIC BLOOD PRESSURE: 88 MMHG | TEMPERATURE: 97 F | SYSTOLIC BLOOD PRESSURE: 126 MMHG

## 2022-07-20 DIAGNOSIS — H65.91 OTITIS MEDIA WITH EFFUSION, RIGHT: Primary | ICD-10-CM

## 2022-07-20 DIAGNOSIS — B37.9 YEAST INFECTION: ICD-10-CM

## 2022-07-20 PROBLEM — H65.93 OTITIS MEDIA WITH EFFUSION, BILATERAL: Status: ACTIVE | Noted: 2022-07-20

## 2022-07-20 PROCEDURE — 99211 OFF/OP EST MAY X REQ PHY/QHP: CPT | Performed by: FAMILY MEDICINE

## 2022-07-20 PROCEDURE — 99213 OFFICE O/P EST LOW 20 MIN: CPT

## 2022-07-20 RX ORDER — AMOXICILLIN 500 MG/1
500 CAPSULE ORAL 2 TIMES DAILY
Qty: 14 CAPSULE | Refills: 0 | Status: SHIPPED | OUTPATIENT
Start: 2022-07-20 | End: 2022-07-27

## 2022-07-20 RX ORDER — FLUCONAZOLE 150 MG/1
150 TABLET ORAL DAILY
Qty: 3 TABLET | Refills: 0 | Status: SHIPPED | OUTPATIENT
Start: 2022-07-20 | End: 2022-07-23

## 2022-07-20 RX ORDER — FLUCONAZOLE 150 MG/1
150 TABLET ORAL ONCE
Qty: 1 TABLET | Refills: 0 | Status: SHIPPED | OUTPATIENT
Start: 2022-07-20 | End: 2022-07-20

## 2022-07-20 ASSESSMENT — ENCOUNTER SYMPTOMS
ABDOMINAL PAIN: 0
SHORTNESS OF BREATH: 0
CHEST TIGHTNESS: 0

## 2022-07-20 NOTE — PROGRESS NOTES
Attending Physician Statement  I have discussed the care off. First name [address] pertinent history and exam findings,  with the resident. I have seen and examined the patient and the key elements of all parts of the encounter have been performed by me. I agree with the assessment, plan and orders as documented by the resident. (GC Modifier)    R OM with effusion- Amoxil 500 mg X 7 days.  FU PRN 1 week

## 2022-07-20 NOTE — PROGRESS NOTES
[address]    Family Medicine Residency Program - Outpatient Note      Subjective:    [place] David is a 45 y.o. 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 Presented to the office today for:  Chief Complaint   Patient presents with    Ear Fullness     Patient here to have her ears looked at. Patient states they feel full and are red and irratated . Patient has tubes in her ears. HPI      Right side: 1.5 weeks   Pain radiating down to neck   Has tympanostomy tubes  Was previously diagnosed with mastoiditis   And 2 sinus surgeries and rhinoplasty   Is also  starting to have pain in the left ear as well   Is having ear discharge from R side ; clear and one day was thicker in consistency   + for hearing loss on the R side   ear pain, a sensation of ear fullness or pressure,   Denies vertigo and disequilibrium  No post nasal drip   Denies sore throat, cough, heartburn, frequent choking, and excessive salivation  Denies smoking or secondhand smoking   No swimming recently  No recent change in meds            Review of Systems   Constitutional:  Negative for chills and fever. Respiratory:  Negative for chest tightness and shortness of breath. Cardiovascular:  Negative for chest pain. Gastrointestinal:  Negative for abdominal pain.                The patient has a   Family History   Problem Relation Age of Onset    Cancer Mother         breast and bone    Heart Disease Father     Diabetes Sister     High Blood Pressure Sister     Diabetes Brother     High Blood Pressure Brother     Heart Disease Maternal Grandmother     Cancer Maternal Grandmother         breast    Cancer Paternal Grandmother     Heart Disease Paternal Grandfather        Objective:    /88 (Site: Left Upper Arm, Position: Sitting, Cuff Size: Large Adult) Comment: manual  Pulse 86   Temp 97 °F (36.1 °C)   Ht 5' 7.99\" (1.727 m)   Wt 254 lb (115.2 kg)   SpO2 98%   BMI 38.63 kg/m²    BP Readings from Last 3 Encounters:   07/20/22 126/88   06/01/22 117/79   05/06/22 120/77       Physical Exam  HENT:      Right Ear: Drainage and tenderness present. A middle ear effusion is present. A PE tube is present. Left Ear: Tenderness present. A middle ear effusion is present. A PE tube is present. Neurological:      Mental Status: She is alert. Lab Results   Component Value Date    WBC 5.5 04/27/2022    HGB 12.2 04/27/2022    HCT 41.5 04/27/2022     04/27/2022    CHOL 195 04/28/2022    TRIG 84 04/28/2022    HDL 56 04/28/2022    ALT 11 04/27/2022    AST 17 04/27/2022     04/27/2022    K 3.6 (L) 04/27/2022     04/27/2022    CREATININE 0.64 04/27/2022    BUN 7 04/27/2022    CO2 22 04/27/2022    TSH 3.23 06/29/2018    INR 0.9 05/17/2016    LABA1C 5.2 04/28/2022     Lab Results   Component Value Date    CALCIUM 9.1 04/27/2022    PHOS 4.1 03/31/2012     Lab Results   Component Value Date    LDLCHOLESTEROL 122 04/28/2022       Assessment and Plan:    1. Otitis media with effusion, Right   - amoxicillin (AMOXIL) 500 MG capsule; Take 1 capsule by mouth in the morning and 1 capsule before bedtime. Do all this for 7 days. Dispense: 14 capsule; Refill: 0    2. Yeast infection prophylactic secondary to antibiotics   - fluconazole (DIFLUCAN) 150 MG tablet; Take 1 tablet by mouth once for 1 dose  Dispense: 1 tablet; Refill: 0        Requested Prescriptions     Signed Prescriptions Disp Refills    amoxicillin (AMOXIL) 500 MG capsule 14 capsule 0     Sig: Take 1 capsule by mouth in the morning and 1 capsule before bedtime. Do all this for 7 days. fluconazole (DIFLUCAN) 150 MG tablet 3 tablet 0     Sig: Take 1 tablet by mouth in the morning for 3 days.          Return if symptoms worsen or fail to

## 2022-07-20 NOTE — PROGRESS NOTES
HYPERTENSION visit     BP Readings from Last 3 Encounters:   22 117/79   22 120/77   22 (!) 141/65       LDL Cholesterol (mg/dL)   Date Value   2022 122     HDL (mg/dL)   Date Value   2022 56     BUN (mg/dL)   Date Value   2022 7     Creatinine (mg/dL)   Date Value   2022 0.64     Glucose (mg/dL)   Date Value   2022 96   2012 133 (H)              Have you changed or started any medications since your last visit including any over-the-counter medicines, vitamins, or herbal medicines? no   Have you stopped taking any of your medications? Is so, why? -  no  Are you having any side effects from any of your medications? - no  How often do you miss doses of your medication? no      Have you seen any other physician or provider since your last visit?  no   Have you had any other diagnostic tests since your last visit? yes - MRI - Brain w/o contrast , xr- chest , CT- head w/o contrast    Have you been seen in the emergency room and/or had an admission in a hospital since we last saw you?  no   Have you had your routine dental cleaning in the past 6 months?  no     Do you have an active MyChart account? If no, what is the barrier?   No:     Patient Care Team:  Calin Davison MD as PCP - General (Family Medicine)    Medical History Review  Past Medical, Family, and Social History reviewed and does not contribute to the patient presenting condition    Health Maintenance   Topic Date Due    COVID-19 Vaccine (1) Never done    Pneumococcal 0-64 years Vaccine (1 - PCV) Never done    HIV screen  Never done    Hepatitis C screen  Never done    DTaP/Tdap/Td vaccine (1 - Tdap) Never done    Colorectal Cancer Screen  Never done    Shingles vaccine (1 of 2) Never done    Breast cancer screen  2018    Flu vaccine (1) 2022    Depression Monitoring  2023    Lipids  2027    Hepatitis A vaccine  Aged Out    Hepatitis B vaccine  Aged Out    Hib vaccine Aged Out    Meningococcal (ACWY) vaccine  Aged Out

## 2022-07-20 NOTE — PATIENT INSTRUCTIONS
Thank you for letting us take care of you today. We hope all your questions were addressed. If a question was overlooked or something else comes to mind after you return home, please contact a member of your Care Team listed below. Your Care Team at [place] [address] is Team #1  Kyler Velazquez MD (Faculty)  Eli Keyes MD(Resident)  Kiersten Huston MD (Resident)  Radha Peace DO (Resident)  Hunter Henson., STAR Altamirano., CHERIE Zavala., FANY Grayson., Lily Salcedo., Kedar Traylor [facility] office)  LisaBarton, [place] (Clinical Practice Manager)  Love Shannon [facility] (Clinical Pharmacist)     Office phone number: [phone]    If you need to get in right away due to illness, please be advised we have \"Same Day\" appointments available Monday-Friday. Please call us at [phone] option #3 to schedule your \"Same Day\" appointment.

## 2022-07-29 NOTE — TELEPHONE ENCOUNTER
Urgent  Patient will be out of medication soon    Pharmacy requesting refill of:  lamotrigine (lamictal) 100 mg BID      Medication active on med list:  yes      Date of last Rx: 4/30/2022  with 3 refills   verified on 7/29/2022   verified by CHERIE LOYOLA      Date of last appointment 6/1/2022    Next Visit Date:  Visit date not found

## 2022-07-30 RX ORDER — LAMOTRIGINE 100 MG/1
100 TABLET ORAL 2 TIMES DAILY
Qty: 60 TABLET | Refills: 2 | Status: SHIPPED | OUTPATIENT
Start: 2022-07-30

## 2022-07-31 ENCOUNTER — APPOINTMENT (OUTPATIENT)
Dept: GENERAL RADIOLOGY | Age: 52
DRG: 193 | End: 2022-07-31

## 2022-07-31 ENCOUNTER — HOSPITAL ENCOUNTER (INPATIENT)
Age: 52
LOS: 3 days | Discharge: HOME | DRG: 193 | End: 2022-08-03
Attending: EMERGENCY MEDICINE | Admitting: FAMILY MEDICINE

## 2022-07-31 ENCOUNTER — APPOINTMENT (OUTPATIENT)
Dept: CT IMAGING | Age: 52
DRG: 193 | End: 2022-07-31

## 2022-07-31 DIAGNOSIS — R06.03 RESPIRATORY DISTRESS: Primary | ICD-10-CM

## 2022-07-31 DIAGNOSIS — J81.0 ACUTE PULMONARY EDEMA (HCC): ICD-10-CM

## 2022-07-31 PROBLEM — J96.91 RESPIRATORY FAILURE WITH HYPOXIA (HCC): Status: ACTIVE | Noted: 2022-07-31

## 2022-07-31 LAB
ABSOLUTE EOS #: 0.11 K/UL (ref 0–0.4)
ABSOLUTE IMMATURE GRANULOCYTE: 0 K/UL (ref 0–0.3)
ABSOLUTE LYMPH #: 5.55 K/UL (ref 1–4.8)
ABSOLUTE MONO #: 0.32 K/UL (ref 0.1–0.8)
ALBUMIN SERPL-MCNC: 3.6 G/DL (ref 3.5–5.2)
ALBUMIN/GLOBULIN RATIO: 1 (ref 1–2.5)
ALP BLD-CCNC: 97 U/L (ref 35–104)
ALT SERPL-CCNC: 74 U/L (ref 5–33)
ANION GAP SERPL CALCULATED.3IONS-SCNC: 22 MMOL/L (ref 9–17)
ANION GAP: 17 MMOL/L (ref 7–16)
AST SERPL-CCNC: 132 U/L
BASOPHILS # BLD: 0 % (ref 0–2)
BASOPHILS ABSOLUTE: 0 K/UL (ref 0–0.2)
BILIRUB SERPL-MCNC: 0.35 MG/DL (ref 0.3–1.2)
BUN BLDV-MCNC: 9 MG/DL (ref 6–20)
CALCIUM SERPL-MCNC: 8.3 MG/DL (ref 8.6–10.4)
CHLORIDE BLD-SCNC: 101 MMOL/L (ref 98–107)
CO2: 12 MMOL/L (ref 20–31)
CREAT SERPL-MCNC: 0.72 MG/DL (ref 0.5–0.9)
EOSINOPHILS RELATIVE PERCENT: 1 % (ref 1–4)
GFR AFRICAN AMERICAN: >60 ML/MIN
GFR NON-AFRICAN AMERICAN: >60 ML/MIN
GFR NON-AFRICAN AMERICAN: >60 ML/MIN
GFR SERPL CREATININE-BSD FRML MDRD: >60 ML/MIN
GFR SERPL CREATININE-BSD FRML MDRD: ABNORMAL ML/MIN/{1.73_M2}
GFR SERPL CREATININE-BSD FRML MDRD: NORMAL ML/MIN/{1.73_M2}
GLUCOSE BLD-MCNC: 130 MG/DL (ref 65–105)
GLUCOSE BLD-MCNC: 134 MG/DL (ref 65–105)
GLUCOSE BLD-MCNC: 270 MG/DL (ref 74–100)
GLUCOSE BLD-MCNC: 282 MG/DL (ref 70–99)
HCO3 VENOUS: 16.6 MMOL/L (ref 22–29)
HCT VFR BLD CALC: 36.6 % (ref 36.3–47.1)
HCT VFR BLD CALC: 39.4 % (ref 36.3–47.1)
HEMOGLOBIN: 11.1 G/DL (ref 11.9–15.1)
HEMOGLOBIN: 11.7 G/DL (ref 11.9–15.1)
IMMATURE GRANULOCYTES: 0 %
LACTIC ACID, WHOLE BLOOD: 1.5 MMOL/L (ref 0.7–2.1)
LYMPHOCYTES # BLD: 53 % (ref 24–44)
MAGNESIUM: 2.1 MG/DL (ref 1.6–2.6)
MCH RBC QN AUTO: 22.6 PG (ref 25.2–33.5)
MCH RBC QN AUTO: 23 PG (ref 25.2–33.5)
MCHC RBC AUTO-ENTMCNC: 29.7 G/DL (ref 28.4–34.8)
MCHC RBC AUTO-ENTMCNC: 30.3 G/DL (ref 28.4–34.8)
MCV RBC AUTO: 74.5 FL (ref 82.6–102.9)
MCV RBC AUTO: 77.4 FL (ref 82.6–102.9)
MONOCYTES # BLD: 3 % (ref 1–7)
MORPHOLOGY: ABNORMAL
MORPHOLOGY: ABNORMAL
NEGATIVE BASE EXCESS, VEN: 12 (ref 0–2)
NRBC AUTOMATED: 0 PER 100 WBC
NRBC AUTOMATED: 0 PER 100 WBC
O2 SAT, VEN: 77 % (ref 60–85)
PARTIAL THROMBOPLASTIN TIME: 21.2 SEC (ref 20.5–30.5)
PARTIAL THROMBOPLASTIN TIME: >120 SEC (ref 20.5–30.5)
PCO2, VEN: 48.6 MM HG (ref 41–51)
PDW BLD-RTO: 15.1 % (ref 11.8–14.4)
PDW BLD-RTO: 15.2 % (ref 11.8–14.4)
PH VENOUS: 7.14 (ref 7.32–7.43)
PLATELET # BLD: 255 K/UL (ref 138–453)
PLATELET # BLD: 276 K/UL (ref 138–453)
PMV BLD AUTO: 10.8 FL (ref 8.1–13.5)
PMV BLD AUTO: 11 FL (ref 8.1–13.5)
PO2, VEN: 53.7 MM HG (ref 30–50)
POC BUN: 9 MG/DL (ref 8–26)
POC CHLORIDE: 108 MMOL/L (ref 98–107)
POC CREATININE: 0.68 MG/DL (ref 0.51–1.19)
POC HEMATOCRIT: 41 % (ref 36–46)
POC HEMOGLOBIN: 14 G/DL (ref 12–16)
POC IONIZED CALCIUM: 1.14 MMOL/L (ref 1.15–1.33)
POC LACTIC ACID: 8.68 MMOL/L (ref 0.56–1.39)
POC POTASSIUM: 3.3 MMOL/L (ref 3.5–4.5)
POC SODIUM: 141 MMOL/L (ref 138–146)
POC TCO2: 17 MMOL/L (ref 22–30)
POTASSIUM SERPL-SCNC: 3.7 MMOL/L (ref 3.7–5.3)
PRO-BNP: 603 PG/ML
PROCALCITONIN: 0.16 NG/ML
RBC # BLD: 4.91 M/UL (ref 3.95–5.11)
RBC # BLD: 5.09 M/UL (ref 3.95–5.11)
SARS-COV-2, RAPID: NOT DETECTED
SEG NEUTROPHILS: 43 % (ref 36–66)
SEGMENTED NEUTROPHILS ABSOLUTE COUNT: 4.52 K/UL (ref 1.8–7.7)
SODIUM BLD-SCNC: 135 MMOL/L (ref 135–144)
SPECIMEN DESCRIPTION: NORMAL
TOTAL PROTEIN: 7.1 G/DL (ref 6.4–8.3)
TROPONIN, HIGH SENSITIVITY: 137 NG/L (ref 0–14)
TROPONIN, HIGH SENSITIVITY: 174 NG/L (ref 0–14)
TROPONIN, HIGH SENSITIVITY: 26 NG/L (ref 0–14)
WBC # BLD: 10.5 K/UL (ref 3.5–11.3)
WBC # BLD: 12.5 K/UL (ref 3.5–11.3)

## 2022-07-31 PROCEDURE — 2580000003 HC RX 258

## 2022-07-31 PROCEDURE — 94761 N-INVAS EAR/PLS OXIMETRY MLT: CPT

## 2022-07-31 PROCEDURE — 80051 ELECTROLYTE PANEL: CPT

## 2022-07-31 PROCEDURE — 93005 ELECTROCARDIOGRAM TRACING: CPT | Performed by: EMERGENCY MEDICINE

## 2022-07-31 PROCEDURE — 6360000002 HC RX W HCPCS

## 2022-07-31 PROCEDURE — 71045 X-RAY EXAM CHEST 1 VIEW: CPT

## 2022-07-31 PROCEDURE — 84145 PROCALCITONIN (PCT): CPT

## 2022-07-31 PROCEDURE — 6370000000 HC RX 637 (ALT 250 FOR IP): Performed by: EMERGENCY MEDICINE

## 2022-07-31 PROCEDURE — 87086 URINE CULTURE/COLONY COUNT: CPT

## 2022-07-31 PROCEDURE — 85014 HEMATOCRIT: CPT

## 2022-07-31 PROCEDURE — 6360000004 HC RX CONTRAST MEDICATION

## 2022-07-31 PROCEDURE — 2700000000 HC OXYGEN THERAPY PER DAY

## 2022-07-31 PROCEDURE — 82565 ASSAY OF CREATININE: CPT

## 2022-07-31 PROCEDURE — 94660 CPAP INITIATION&MGMT: CPT

## 2022-07-31 PROCEDURE — 71260 CT THORAX DX C+: CPT

## 2022-07-31 PROCEDURE — 99285 EMERGENCY DEPT VISIT HI MDM: CPT

## 2022-07-31 PROCEDURE — 84520 ASSAY OF UREA NITROGEN: CPT

## 2022-07-31 PROCEDURE — 82803 BLOOD GASES ANY COMBINATION: CPT

## 2022-07-31 PROCEDURE — 6370000000 HC RX 637 (ALT 250 FOR IP)

## 2022-07-31 PROCEDURE — 93005 ELECTROCARDIOGRAM TRACING: CPT | Performed by: INTERNAL MEDICINE

## 2022-07-31 PROCEDURE — 2060000000 HC ICU INTERMEDIATE R&B

## 2022-07-31 PROCEDURE — 87635 SARS-COV-2 COVID-19 AMP PRB: CPT

## 2022-07-31 PROCEDURE — 6360000002 HC RX W HCPCS: Performed by: EMERGENCY MEDICINE

## 2022-07-31 PROCEDURE — 2500000003 HC RX 250 WO HCPCS: Performed by: EMERGENCY MEDICINE

## 2022-07-31 PROCEDURE — 83605 ASSAY OF LACTIC ACID: CPT

## 2022-07-31 PROCEDURE — 83880 ASSAY OF NATRIURETIC PEPTIDE: CPT

## 2022-07-31 PROCEDURE — 96374 THER/PROPH/DIAG INJ IV PUSH: CPT

## 2022-07-31 PROCEDURE — 85027 COMPLETE CBC AUTOMATED: CPT

## 2022-07-31 PROCEDURE — 82947 ASSAY GLUCOSE BLOOD QUANT: CPT

## 2022-07-31 PROCEDURE — 85730 THROMBOPLASTIN TIME PARTIAL: CPT

## 2022-07-31 PROCEDURE — 82330 ASSAY OF CALCIUM: CPT

## 2022-07-31 PROCEDURE — A4216 STERILE WATER/SALINE, 10 ML: HCPCS

## 2022-07-31 PROCEDURE — 87040 BLOOD CULTURE FOR BACTERIA: CPT

## 2022-07-31 PROCEDURE — 99223 1ST HOSP IP/OBS HIGH 75: CPT | Performed by: FAMILY MEDICINE

## 2022-07-31 PROCEDURE — 85025 COMPLETE CBC W/AUTO DIFF WBC: CPT

## 2022-07-31 PROCEDURE — 84484 ASSAY OF TROPONIN QUANT: CPT

## 2022-07-31 PROCEDURE — 96375 TX/PRO/DX INJ NEW DRUG ADDON: CPT

## 2022-07-31 PROCEDURE — 2500000003 HC RX 250 WO HCPCS

## 2022-07-31 PROCEDURE — 6360000002 HC RX W HCPCS: Performed by: STUDENT IN AN ORGANIZED HEALTH CARE EDUCATION/TRAINING PROGRAM

## 2022-07-31 PROCEDURE — 83735 ASSAY OF MAGNESIUM: CPT

## 2022-07-31 PROCEDURE — 36415 COLL VENOUS BLD VENIPUNCTURE: CPT

## 2022-07-31 PROCEDURE — 80053 COMPREHEN METABOLIC PANEL: CPT

## 2022-07-31 RX ORDER — LAMOTRIGINE 100 MG/1
100 TABLET ORAL 2 TIMES DAILY
Status: DISCONTINUED | OUTPATIENT
Start: 2022-07-31 | End: 2022-08-03 | Stop reason: HOSPADM

## 2022-07-31 RX ORDER — ONDANSETRON 2 MG/ML
4 INJECTION INTRAMUSCULAR; INTRAVENOUS ONCE
Status: COMPLETED | OUTPATIENT
Start: 2022-07-31 | End: 2022-07-31

## 2022-07-31 RX ORDER — ATORVASTATIN CALCIUM 40 MG/1
40 TABLET, FILM COATED ORAL NIGHTLY
Status: DISCONTINUED | OUTPATIENT
Start: 2022-07-31 | End: 2022-08-03 | Stop reason: HOSPADM

## 2022-07-31 RX ORDER — ASPIRIN 81 MG/1
324 TABLET, CHEWABLE ORAL ONCE
Status: COMPLETED | OUTPATIENT
Start: 2022-07-31 | End: 2022-07-31

## 2022-07-31 RX ORDER — SODIUM CHLORIDE 9 MG/ML
INJECTION, SOLUTION INTRAVENOUS PRN
Status: DISCONTINUED | OUTPATIENT
Start: 2022-07-31 | End: 2022-08-02 | Stop reason: SDUPTHER

## 2022-07-31 RX ORDER — POLYETHYLENE GLYCOL 3350 17 G/17G
17 POWDER, FOR SOLUTION ORAL DAILY PRN
Status: DISCONTINUED | OUTPATIENT
Start: 2022-07-31 | End: 2022-08-03 | Stop reason: HOSPADM

## 2022-07-31 RX ORDER — INSULIN LISPRO 100 [IU]/ML
0-4 INJECTION, SOLUTION INTRAVENOUS; SUBCUTANEOUS
Status: DISCONTINUED | OUTPATIENT
Start: 2022-07-31 | End: 2022-08-03 | Stop reason: HOSPADM

## 2022-07-31 RX ORDER — SODIUM CHLORIDE 0.9 % (FLUSH) 0.9 %
5-40 SYRINGE (ML) INJECTION EVERY 12 HOURS SCHEDULED
Status: DISCONTINUED | OUTPATIENT
Start: 2022-07-31 | End: 2022-08-02 | Stop reason: SDUPTHER

## 2022-07-31 RX ORDER — ONDANSETRON 2 MG/ML
INJECTION INTRAMUSCULAR; INTRAVENOUS
Status: COMPLETED
Start: 2022-07-31 | End: 2022-07-31

## 2022-07-31 RX ORDER — FUROSEMIDE 10 MG/ML
20 INJECTION INTRAMUSCULAR; INTRAVENOUS 2 TIMES DAILY
Status: DISCONTINUED | OUTPATIENT
Start: 2022-07-31 | End: 2022-08-03

## 2022-07-31 RX ORDER — INSULIN LISPRO 100 [IU]/ML
0-4 INJECTION, SOLUTION INTRAVENOUS; SUBCUTANEOUS NIGHTLY
Status: DISCONTINUED | OUTPATIENT
Start: 2022-07-31 | End: 2022-08-03 | Stop reason: HOSPADM

## 2022-07-31 RX ORDER — FUROSEMIDE 10 MG/ML
20 INJECTION INTRAMUSCULAR; INTRAVENOUS ONCE
Status: COMPLETED | OUTPATIENT
Start: 2022-07-31 | End: 2022-07-31

## 2022-07-31 RX ORDER — NITROGLYCERIN 20 MG/100ML
5-200 INJECTION INTRAVENOUS CONTINUOUS
Status: DISCONTINUED | OUTPATIENT
Start: 2022-07-31 | End: 2022-07-31

## 2022-07-31 RX ORDER — HEPARIN SODIUM 1000 [USP'U]/ML
80 INJECTION, SOLUTION INTRAVENOUS; SUBCUTANEOUS PRN
Status: DISCONTINUED | OUTPATIENT
Start: 2022-07-31 | End: 2022-08-02

## 2022-07-31 RX ORDER — HEPARIN SODIUM 1000 [USP'U]/ML
80 INJECTION, SOLUTION INTRAVENOUS; SUBCUTANEOUS ONCE
Status: COMPLETED | OUTPATIENT
Start: 2022-07-31 | End: 2022-07-31

## 2022-07-31 RX ORDER — HEPARIN SODIUM AND DEXTROSE 10000; 5 [USP'U]/100ML; G/100ML
5-30 INJECTION INTRAVENOUS CONTINUOUS
Status: DISCONTINUED | OUTPATIENT
Start: 2022-07-31 | End: 2022-08-02

## 2022-07-31 RX ORDER — ONDANSETRON 2 MG/ML
4 INJECTION INTRAMUSCULAR; INTRAVENOUS EVERY 6 HOURS PRN
Status: DISCONTINUED | OUTPATIENT
Start: 2022-07-31 | End: 2022-08-03 | Stop reason: HOSPADM

## 2022-07-31 RX ORDER — HEPARIN SODIUM 1000 [USP'U]/ML
40 INJECTION, SOLUTION INTRAVENOUS; SUBCUTANEOUS PRN
Status: DISCONTINUED | OUTPATIENT
Start: 2022-07-31 | End: 2022-08-02

## 2022-07-31 RX ORDER — DICYCLOMINE HYDROCHLORIDE 10 MG/1
10 CAPSULE ORAL EVERY 6 HOURS PRN
Status: DISCONTINUED | OUTPATIENT
Start: 2022-07-31 | End: 2022-08-03 | Stop reason: HOSPADM

## 2022-07-31 RX ORDER — SODIUM CHLORIDE 0.9 % (FLUSH) 0.9 %
5-40 SYRINGE (ML) INJECTION PRN
Status: DISCONTINUED | OUTPATIENT
Start: 2022-07-31 | End: 2022-08-02 | Stop reason: SDUPTHER

## 2022-07-31 RX ORDER — ONDANSETRON 4 MG/1
4 TABLET, ORALLY DISINTEGRATING ORAL EVERY 8 HOURS PRN
Status: DISCONTINUED | OUTPATIENT
Start: 2022-07-31 | End: 2022-08-03 | Stop reason: HOSPADM

## 2022-07-31 RX ORDER — LAMOTRIGINE 100 MG/1
100 TABLET ORAL 2 TIMES DAILY
Status: DISCONTINUED | OUTPATIENT
Start: 2022-07-31 | End: 2022-07-31

## 2022-07-31 RX ORDER — DEXTROSE MONOHYDRATE 100 MG/ML
INJECTION, SOLUTION INTRAVENOUS CONTINUOUS PRN
Status: DISCONTINUED | OUTPATIENT
Start: 2022-07-31 | End: 2022-08-03 | Stop reason: HOSPADM

## 2022-07-31 RX ORDER — ASPIRIN 81 MG/1
81 TABLET, CHEWABLE ORAL DAILY
Status: DISCONTINUED | OUTPATIENT
Start: 2022-08-01 | End: 2022-08-03 | Stop reason: HOSPADM

## 2022-07-31 RX ORDER — LOSARTAN POTASSIUM 50 MG/1
50 TABLET ORAL 2 TIMES DAILY
Status: DISCONTINUED | OUTPATIENT
Start: 2022-07-31 | End: 2022-08-03

## 2022-07-31 RX ADMIN — SODIUM CHLORIDE, PRESERVATIVE FREE 10 ML: 5 INJECTION INTRAVENOUS at 21:29

## 2022-07-31 RX ADMIN — SODIUM CHLORIDE, PRESERVATIVE FREE 20 MG: 5 INJECTION INTRAVENOUS at 21:29

## 2022-07-31 RX ADMIN — ONDANSETRON 4 MG: 2 INJECTION INTRAMUSCULAR; INTRAVENOUS at 08:40

## 2022-07-31 RX ADMIN — ASPIRIN 324 MG: 81 TABLET ORAL at 10:31

## 2022-07-31 RX ADMIN — DESMOPRESSIN ACETATE 40 MG: 0.2 TABLET ORAL at 20:21

## 2022-07-31 RX ADMIN — HEPARIN SODIUM 18 UNITS/KG/HR: 5000 INJECTION, SOLUTION INTRAVENOUS; SUBCUTANEOUS at 13:07

## 2022-07-31 RX ADMIN — CEFTRIAXONE SODIUM 1000 MG: 1 INJECTION, POWDER, FOR SOLUTION INTRAMUSCULAR; INTRAVENOUS at 12:08

## 2022-07-31 RX ADMIN — SODIUM CHLORIDE, PRESERVATIVE FREE 20 MG: 5 INJECTION INTRAVENOUS at 14:44

## 2022-07-31 RX ADMIN — HEPARIN SODIUM 9200 UNITS: 1000 INJECTION INTRAVENOUS; SUBCUTANEOUS at 13:04

## 2022-07-31 RX ADMIN — AZITHROMYCIN MONOHYDRATE 250 MG: 500 INJECTION, POWDER, LYOPHILIZED, FOR SOLUTION INTRAVENOUS at 13:04

## 2022-07-31 RX ADMIN — POTASSIUM BICARBONATE 40 MEQ: 782 TABLET, EFFERVESCENT ORAL at 10:31

## 2022-07-31 RX ADMIN — NITROGLYCERIN 5 MCG/MIN: 20 INJECTION INTRAVENOUS at 09:04

## 2022-07-31 RX ADMIN — IOPAMIDOL 75 ML: 755 INJECTION, SOLUTION INTRAVENOUS at 12:32

## 2022-07-31 RX ADMIN — FUROSEMIDE 20 MG: 10 INJECTION, SOLUTION INTRAMUSCULAR; INTRAVENOUS at 18:06

## 2022-07-31 RX ADMIN — METOPROLOL TARTRATE 12.5 MG: 25 TABLET ORAL at 14:44

## 2022-07-31 RX ADMIN — FUROSEMIDE 20 MG: 10 INJECTION, SOLUTION INTRAMUSCULAR; INTRAVENOUS at 09:01

## 2022-07-31 RX ADMIN — ONDANSETRON 4 MG: 2 INJECTION INTRAMUSCULAR; INTRAVENOUS at 09:33

## 2022-07-31 RX ADMIN — LAMOTRIGINE 100 MG: 100 TABLET ORAL at 18:06

## 2022-07-31 ASSESSMENT — ENCOUNTER SYMPTOMS
NAUSEA: 1
COUGH: 1
VOMITING: 1
CHEST TIGHTNESS: 1
COUGH: 0
SHORTNESS OF BREATH: 1
NAUSEA: 0
EYES NEGATIVE: 1
ALLERGIC/IMMUNOLOGIC NEGATIVE: 1
ABDOMINAL PAIN: 0
DIARRHEA: 0

## 2022-07-31 NOTE — ED NOTES
Pt arrives to ed via [facility] 11 on CPAP from home. Pt arrives sob, only able to shake her head yes and no at this time. EMS states they were unable to obtain oxygen saturation on patient. EMS established IV access to the right hand, administered 2 mg versed prior to placing BIPAP. Pt is nausea's, coughing up thick, blood tinged sputum that she is able to spit out at this time. Respiratory called to bedside to place pt on BIPAP. Pt changed into gown, placed on full cardiac monitor, bp cuff and pulse ox.       Chele Dorsey RN  07/31/22 8547

## 2022-07-31 NOTE — ED PROVIDER NOTES
[facility]     Emergency Department     Faculty Attestation    I performed a history and physical examination of the patient and discussed management with the resident. I reviewed the resident´s note and agree with the documented findings and plan of care. Any areas of disagreement are noted on the chart. I was personally present for the key portions of any procedures. I have documented in the chart those procedures where I was not present during the key portions. I have reviewed the emergency nurses triage note. I agree with the chief complaint, past medical history, past surgical history, allergies, medications, social and family history as documented unless otherwise noted below. For Physician Assistant/ Nurse Practitioner cases/documentation I have personally evaluated this patient and have completed at least one if not all key elements of the E/M (history, physical exam, and MDM). Additional findings are as noted. On BiPAP, moderate to severe respiratory distress, coarse rhonchi at the bases, heart tachycardic without definite murmur. Bilateral trace pretibial pitting without calf pain. EKG Interpretation    Interpreted by emergency department physician    Rhythm: normal sinus   Rate: 119  Axis: normal 40  Ectopy: PACs  Conduction: normal  ST Segments: no acute change  T Waves: no acute change  Q Waves: none    Clinical Impression: Normal EKG except for sinus tachycardia    Dante Lara, III     Dante Lara MD  07/31/22 7125    CRITICAL CARE: There was a high probability of clinically significant/life threatening deterioration in this patient's condition which required my urgent intervention. Total critical care time was 30 minutes. This excludes any time for separately reportable procedures.          Dante Lara MD  07/31/22 3790

## 2022-07-31 NOTE — ED NOTES
Message left for patients brother. Writer provided phone number on VM.      Chele oDrsey RN  07/31/22 5202

## 2022-07-31 NOTE — ED NOTES
Pt continues to bring up thick, blood tinged sputum. Pt states she is starting to feel a bit better but is unable to sit back in the bed to relax due to discomfort. Resident at bedside and aware.       Aleta Phalen, RN  07/31/22 8547

## 2022-07-31 NOTE — PLAN OF CARE
Received alert from sepsis protocol team regarding patient's elevated lactic acid of 8.6 in the ED. Patient did not receive a fluid bolus in the ED. When received signout from ED they were not too concerned about patient's lactic acidosis. Patient currently has pulmonary edema, will hold off on fluids at this time. Blood cultures x2 and urine culture were ordered. Blood cultures are negative so far. Patient's WBC elevated at 12.5, CT of the chest showed pulmonary edema and multifocal pneumonia. Started patient on Rocephin and Zithromax. Repeat lactic acid ordered and is pending. We will monitor.     [facility] Resident, PGY 2    7/31/2022  4:29 PM

## 2022-07-31 NOTE — ED NOTES
Pt asking if writer will contact her brother, Pasquale De La Rosa and notify him that she is in the hospital.      Jorge Beth RN  07/31/22 6581

## 2022-07-31 NOTE — ED NOTES
The following labs labeled with pt sticker and tubed to lab:     [] Blue     [] Lavender   [] on ice  [x] Green/yellow  [] Green/black [] on ice  [] Yellow  [] Red  [] Pink      [] COVID-19 swab    [] Rapid  [] PCR  [] Flu swab  [] Peds Viral Panel     [] Urine Sample  [] Pelvic Cultures  [] Blood Cultures      Maxwell Hogan RN  07/31/22 9698

## 2022-07-31 NOTE — ED NOTES
Admitting attending Dr. Lex Rosas at bedside evaluating the patient with the admitting residents. They were notified of the patient's new troponin.      Ninfa Mendez RN  07/31/22 9301

## 2022-07-31 NOTE — ED NOTES
2 sets of blood cultures upon pt arrival due to clinical presentation. Right writs and left forearm. Blood cultures, labeled and at bedside. Awaiting orders.       Baldev Lindsay RN  07/31/22 6120

## 2022-07-31 NOTE — LETTER
[facility]  [address]  Phone: [phone]    Katty Mathew MD        August 3, 2022     Patient: Boris Rich   YOB: 1970   Date of Visit: 7/31/2022       To Whom It May Concern: It is my medical opinion that Clifton Kemps may return to full duty immediately with no restrictions on 08/04/2022. If you have any questions or concerns, please don't hesitate to call.     Sincerely,        Katty Mathew MD

## 2022-07-31 NOTE — ED NOTES
The following labs labeled with pt sticker and tubed to lab:     [x] Blue     [x] Lavender   [] on ice  [x] Green/yellow  [] Green/black [] on ice  [] Yellow  [] Red  [] Pink      [] COVID-19 swab    [] Rapid  [] PCR  [] Flu swab  [] Peds Viral Panel     [] Urine Sample  [] Pelvic Cultures  [] Blood Cultures      Axel Vigil RN  07/31/22 2881

## 2022-07-31 NOTE — ED NOTES
Pt now speaking in full, complete sentences. Pt is alert, oriented, states she just feels heaviness in her chest, feels fluid overloaded. Pt continues to cough of thick secretions and is able to bring them up.       Bee Mauricio RN  07/31/22 2404

## 2022-07-31 NOTE — VCC REMOTE MONITORING
Spoke with Thor Hendricks RN, regarding 6 hour sepsis bundle    Thanks  SHAHID DohertyN, Orlin Manrique

## 2022-07-31 NOTE — ED NOTES
Writer spoke with , who states she will add the troponin onto the blood already present in lab.      Mirtha Cavazos RN  07/31/22 5760

## 2022-07-31 NOTE — ED NOTES
Pt continues to have large amount of frothy, bloody sputum.  Pt BIPAP mask is filled with sputum, respiratory notified      Lawanda Cooney RN  07/31/22 7856

## 2022-07-31 NOTE — ED NOTES
Admitting team contacted for blood culture order, notified that pt c/o headache and reflux discomfort. Awaiting orders.       Adam Baez RN  07/31/22 7335

## 2022-07-31 NOTE — ED NOTES
Critical troponin of 137 received from lab.  Admitting team notified     Yumiko Doe RN  07/31/22 1723 Thomas Ohara RN  07/31/22 3880

## 2022-07-31 NOTE — FLOWSHEET NOTE
Advance Care Planning     Advance Care Planning Inpatient Note  Spiritual Care Department    Today's Date: 7/31/2022  Unit: [facility] STEPDOWN    Received request from [employer]. Upon review of chart and communication with care team, patient's decision making abilities are not in question. . Patient was/were present in the room during visit. Goals of ACP Conversation:  Discuss advance care planning documents    Health Care Decision Makers:    Primary Agent: Britta Meza. Last Oliveira, brother ([phone])  Alternate: Shari Awad, son ([phone])  Alternate: Isrrael Patel, son ([phone])    Summary:  Completed [address] [place] [address] (Patient Wishes):  Healthcare Power of /Advance Directive Appointment of [address]  Living Will/Advance Directive  Anatomical Gift/Organ Donation     Assessment:  Patient's decision making facilities were not in question. Patient was discouraged from being in the hospital again, but she was coping well. Patient has strong family support.  witnessed patient calling agent to confirm address. Patient grateful for the visit and in overall good spirits. Interventions:  Provided education on documents for clarity and greater understanding  Assisted in the completion of documents according to patient's wishes at this time    Care Preferences Communicated:   No    Outcomes/Plan:  New advance directive completed. Returned original document(s) to patient, as well as copies for distribution to appointed agents  Copy of advance directive given to staff to scan into medical record.     Electronically signed by Aliza Ballard [address] [place] [address] Intern on 7/31/2022 at 7:46 PM

## 2022-07-31 NOTE — ED NOTES
Pt nausea's, BIPAP mask removed, pt vomiting, given 4 mg IV zofran     Guicho Shultz, RN  07/31/22 7896

## 2022-07-31 NOTE — ED NOTES
Pt linens changed by patient care techs. Pt tolerating well. Pure wick catheter remains in place.       Devin Armstrong RN  07/31/22 8196

## 2022-07-31 NOTE — ED NOTES
Pt has large amount of thick, frothy, blood tinged sputum that she is able to cough up and spit out.       Chele Dorsey, WILLA  07/31/22 4531

## 2022-07-31 NOTE — ED NOTES
The following labs labeled with pt sticker and tubed to lab:     [] Blue     [] Lavender   [] on ice  [] Green/yellow  [] Green/black [] on ice  [] Yellow  [] Red  [] Pink      [x] COVID-19 swab    [x] Rapid  [] PCR  [] Flu swab  [] Peds Viral Panel     [] Urine Sample  [] Pelvic Cultures  [] Blood Cultures          Debi Mohr RN  07/31/22 1933

## 2022-07-31 NOTE — CONSULTS
[facility]   Consult Note         Today's Date: 7/31/2022  Patient Name: Emilie Raymundo  Date of admission: 7/31/2022  8:36 AM  Patient's age: 45 y.o., 1970  Admission Dx: Respiratory distress [R06.03]  Respiratory failure with hypoxia ([address] [J96.91]    Reason for Consult:  Cardiac evaluation    Requesting Physician: Marleni Cage MD    REASON FOR CONSULT:  Elevated Troponin's    History Obtained From:  Patient, chart, staff, records    HISTORY OF PRESENT ILLNESS:      The patient is a 45 y.o. female with background history of hypertension, depression, fibromyalgia, recent diagnosis of seizure disorder and gastroparesis is presenting to the hospital with sudden onset of chest tightness and shortness of breath. According to the patient she was feeling all right yesterday and over the night she started to have some chest tightness. Patient then became short of breath which was getting worse and she was even short of breath at rest.  Patient friend checked on her and found that she was short of breath and vomiting. EMS was called and on presentation patient was saturating 68% and she was started on BiPAP. Patient Started to Have Some Hemoptysis and Then She Was Changed to High Flow Nasal Cannula. Patient Initial Troponin Were 26 Which Trended up to 137 and There Was a Reason Cardiology Was Consulted. EKG Is Normal Sinus Rhythm with No Acute Changes. Patient Is Currently Feeling Short of Breath but No Chest Tightness, Had Vomiting Episodes in the Emergency Department. Past Medical History:   has a past medical history of Asthma, Depression, ESBL (extended spectrum beta-lactamase) producing bacteria infection, Fibromyalgia, Gastroparesis, Headache, Hyperlipidemia, Hypertension, Neuropathy, Osteoarthritis, Seizure ([address] and Tennis elbow. Past Surgical History:   has a past surgical history that includes Hysterectomy; Breast surgery; Tonsillectomy;  Adenoidectomy; sinus surgery; Cosmetic surgery; knee surgery; and Cardiac catheterization (7-). Home Medications:    Prior to Admission medications    Medication Sig Start Date End Date Taking? Authorizing Provider   lamoTRIgine (LAMICTAL) 100 MG tablet Take 1 tablet by mouth in the morning and 1 tablet before bedtime. 7/30/22   Jeffry Macias MD   losartan (COZAAR) 50 mg tablet Take 1 tablet by mouth in the morning and at bedtime 6/10/22   Venessa Cavazos MD   lamoTRIgine (LAMICTAL) 25 MG tablet Weekly titation: Week 1: 25mg QAM Week 2: 25mg BID Week 3: 25mg QAM, 50mg QPM Week 4: 50mg BID Week 5: 50mg QAM, 75mg QPM Week 6: 75mg BID Week 7: 75mg QAM, 100mg QPM Week 8: 100mg BID 4/30/22   Jeffery Joiner MD   lidocaine (LIDODERM) 5 % Place 1 patch onto the skin daily 12 hours on, 12 hours off. 11/16/19   ANNA Yates - CNP   cyclobenzaprine (FLEXERIL) 10 MG tablet Take 1 tablet by mouth 3 times daily as needed for Muscle spasms 11/15/19   Osmani Valentine MD   benzocaine-menthol (CEPACOL SORE THROAT) 15-3.6 MG lozenge Take 1 lozenge by mouth every 2 hours as needed for Sore Throat 9/18/19   [address], PA-C   diclofenac sodium 1 % GEL Apply 2 g topically 2 times daily 4/20/19   Gabino Stovall DO   Elastic Bandages & Supports (KNEE BRACE ADJUSTABLE HINGED) MISC 1 Device by Does not apply route as needed (comfort) 12/19/18   Helga Larsen MD   dicyclomine (BENTYL) 10 MG capsule Take 1 capsule by mouth every 6 hours as needed (cramps) 5/26/18   Tanmay Aguilera DO   Misc. Devices (WRIST BRACE) MISC 1 Device by Does not apply route daily One brace to be worn at night and daily as needed for carpal tunnel syndrome.  Brace should place the wrist in neutral. 2/20/17   Fredricka Aschoff, DO   EPINEPHrine (EPIPEN) 0.3 MG/0.3ML SOAJ injection Inject 0.3 mg into the muscle as needed Use as directed for allergic reaction    Historical Provider, MD   Elastic Bandages & Supports (TENNIS ELBOW SUPPORT) MISC 1 Device by Does not apply route as needed 6/2/15   Ximena Hilton MD   ondansetron (ZOFRAN) 4 MG tablet Take 1 tablet by mouth every 8 hours as needed for Nausea. 8/8/14   Alexey Guaman DO   indomethacin (INDOCIN) 50 MG capsule Take 1 capsule by mouth 3 times daily (with meals). 7/26/14   Layne Giang MD       heparin (porcine), 80 Units/kg (Order-Specific), IntraVENous, Once    cefTRIAXone (ROCEPHIN) IV, 1,000 mg, IntraVENous, Q24H    azithromycin, 250 mg, IntraVENous, Q24H      Allergies:  Latex, Motrin [ibuprofen], Norco [hydrocodone-acetaminophen], Tramadol, Naproxen, and Tylenol [acetaminophen]    Social History:   reports that she has never smoked. She has never used smokeless tobacco. She reports current alcohol use. She reports current drug use. Drug: Marijuana Lia Lili). Family History: family history includes Cancer in her maternal grandmother, mother, and paternal grandmother; Diabetes in her brother and sister; Heart Disease in her father, maternal grandmother, and paternal grandfather; High Blood Pressure in her brother and sister. No h/o sudden cardiac death. REVIEW OF SYSTEMS:    Constitutional: Positive for change in activity level     Eyes: No visual changes or diplopia. No scleral icterus. ENT: No Headaches, hearing loss or vertigo. No mouth sores or sore throat. Cardiovascular: per HPI  Respiratory: per HPI  Gastrointestinal: No abdominal pain, appetite loss, blood in stools. No change in bowel or bladder habits. Genitourinary: No dysuria, trouble voiding, or hematuria. Musculoskeletal:  No gait disturbance, No weakness or joint complaints. Integumentary: No rash or pruritis. Neurological: No headache, diplopia, change in muscle strength, numbness or tingling.  No change in gait, balance, coordination, mood, affect, memory, mentation, behavior    PHYSICAL EXAM:      /82   Pulse 84   Temp (!) 96.3 °F (35.7 °C) (Axillary)   Resp 19   Ht 5' 8\" (1.727 m)   Wt 248 lb (112.5 kg)   SpO2 94% BMI 37.71 kg/m²    Constitutional and General Appearance: Alert and in mild distress due to shortness of breath. HEENT: PERRL, no cervical lymphadenopathy. No masses palpable. Normal oral mucosa  Respiratory:  Bilateral basal crepitations on chest auscultation. Cardiovascular:  And second heart sounds with no added sounds and no murmurs  Abdomen:   No masses or tenderness  Bowel sounds present  Extremities:   No Cyanosis or Clubbing   Lower extremity edema: +   Skin: Warm and dry  Neurological:  Alert and oriented. Moves all extremities well  No abnormalities of mood, affect, memory, mentation, or behavior are noted        EKG:    Date: 07/31/22  Reading: No acute ischemia  Normal sinus rhythm with no acute changes    LAST ECHO:  Date: 07/11/2015  Findings Summary:  Summary  Inferior hypokinesis  Normal LVEF  Mild MR and mild LA enlargement  Borderline pulmonary hypertension    LAST Stress Test:   Date of last ST: 10/25/2021  Major Findings:  Perfusion:  Normal       Function:  No significant wall motion abnormalities. Left ventricular   ejection fraction of 60%. Risk stratification: LOW     LAST Cardiac Angiography:.  Date: 07/11/2015  Findings:  Left main: NL  LAD: NL  LCX: NL  RCA: NL  The LV gram was performed in the THORPE 30 position. LVEF: 55%. LV Wall Motion: Normal        Conclusions:   Normal / Non obstructive CAD  Overall preserved LV function    Labs:     CBC:   Recent Labs     07/31/22  0850   WBC 10.5   HGB 11.7*   HCT 39.4        BMP:   Recent Labs     07/31/22  0850 07/31/22  0858     --    K 3.7  --    CO2 12*  --    BUN 9  --    CREATININE 0.72 0.68   LABGLOM >60 >60   GLUCOSE 282*  --      BNP: No results for input(s): BNP in the last 72 hours. PT/INR: No results for input(s): PROTIME, INR in the last 72 hours. APTT:No results for input(s): APTT in the last 72 hours.   CARDIAC ENZYMES:No results for input(s): CKTOTAL, CKMB, CKMBINDEX, TROPONINI in the last 72 hours.  FASTING LIPID PANEL:  Lab Results   Component Value Date/Time    HDL 56 04/28/2022 02:01 AM    TRIG 84 04/28/2022 02:01 AM     LIVER PROFILE:  Recent Labs     07/31/22  0850   *   ALT 74*   LABALBU 3.6     Troponins: Invalid input(s): TROPONIN     Other Current Problems  Patient Active Problem List   Diagnosis    Chest pain    Abdominal pain    Gastroparesis    Hypertension    Asthma    Neuropathy    Fibromyalgia    Depression    S/P cardiac cath- Normal 7/14/15 -= Dr. Jenny Beltran    Right sided weakness    Right arm numbness    Right leg numbness    Intractable hemiplegic migraine with status migrainosus    Hemifacial spasm    Effusion of left knee joint    Leg pain, left    Seizure (HCC)    Bilateral carpal tunnel syndrome    Mastoiditis    Inadequate social support    Focal epilepsy ([address]    Otitis media with effusion, bilateral    Yeast infection    Respiratory distress    Respiratory failure with hypoxia (HCC)           IMPRESSION:  Elevated and uptrending troponins type II versus type I type I, EKG NSR with no acute changes. Acute hypoxic respiratory failure  Acute pulmonary edema  Hemoptysis likely due to acute pulmonary edema  Community-acquired pneumonia, multifocal  Elevated fasting blood glucose likely new diagnosis of diabetes mellitus  Hypertension  Fibromyalgia  Seizures    Recommendations:    Continue Heparin drip if there is no contraindication  Patient is already loaded with aspirin 324 mg in the emergency department, will start ASA 81 mg daily, Lipitor 40 mg daily and lopressor 12.5 mg BID  IV diuresis with Lasix 20 mg BID  Wean off Nitro drip  Monitor intake/output record  Fluid restriction   Patient will need ACEi prior to discharge  Trend troponin,s  The antibiotics as per the primary team  Check respiratory panel.   Patient will need heart cath once stable      Discussed with patient    Electronically signed by Jessika Cabrera MD on 7/31/2022 at 12:14 PM    Jessika Cabrera MD  Internal Medicine Resident, PGY-3  [facility],  [facility].  12:14 PM 07/31/22        I reviewed the patient history personally, and examined by me during the visit. I have reviewed the H&P/Consult / Progress note as completed, and made appropriate changes to the patient exam and treatment plans.       I have reviewed the case in details including physical exam and treatment plan with resident / fellow / NP    Patient treatment plan was explained to patient, correction in notes was made as appropriate, and discussed final arrangement based on  my evaluation and exam.    Additional Recommendations:      Judy Pelayo MD  [facility]

## 2022-07-31 NOTE — H&P
[address] [facility]  History & Physical Examination Note              Date:   7/31/2022  Patient name:  Thaddeus Bamberger  Date of admission:  7/31/2022  8:36 AM  MRN:   [id number]  YOB: 1970    CHIEF COMPLAINT:     Chief Complaint   Patient presents with    Shortness of Breath       History Obtained From:  Patient and chart review. HPI:     The patient is a 45 y.o.  female with history of hypertension, seizures, gastroparesis, fibromyalgia, and asthma presented to the ED today complaining of shortness of breath and chest pain being admitted for management of hypoxic respiratory failure, rule out ACS. Per patient, she started having sudden onset shortness of breath last night. Patient stated not being able to breathe while laying flat, and feeling like she was drowning. No history of CHF. No history of recent asthma exacerbations, has not been using her inhaler for the past 2 years. This morning EMS were called due to worsening shortness of breath. In ED, patient endorses shortness of breath accompanied by sharp pleuritic chest pain. States worsening cough with hemoptysis. Denies fever, chills, N/V/D. In ER, on presentation she was in respiratory distress, tachypneic with respiratory rate in 30s, on BiPAP saturating 68% on arrival.  Her respirations improved on BiPAP however she was taken off of BiPAP due to an episode of vomiting and was started on high flow nasal cannula. Labs were significant for elevated proBNP of 603, VBG significant for pH of 7.142 and bicarb of 16.6, elevated lactic acid level of of 8.68, and Elevated troponins with initial troponin 26 and repeat troponin of 137. Chest x-ray was significant for diffuse pneumonia versus pulmonary edema. EKG is normal sinus rhythm with no acute changes.     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 PAST SURGICAL HISTORY:      has a past surgical history that includes Hysterectomy; Breast surgery; Tonsillectomy; Adenoidectomy; sinus surgery; Cosmetic surgery; knee surgery; and Cardiac catheterization (7-). FAMILY HISTORY:     family history includes Cancer in her maternal grandmother, mother, and paternal grandmother; Diabetes in her brother and sister; Heart Disease in her father, maternal grandmother, and paternal grandfather; High Blood Pressure in her brother and sister. HOME MEDICATIONS:     Prior to Admission medications    Medication Sig Start Date End Date Taking? Authorizing Provider   lamoTRIgine (LAMICTAL) 100 MG tablet Take 1 tablet by mouth in the morning and 1 tablet before bedtime.  7/30/22   Sonny Webb MD   losartan (COZAAR) 50 mg tablet Take 1 tablet by mouth in the morning and at bedtime 6/10/22   Stacia Guillen MD   lamoTRIgine (LAMICTAL) 25 MG tablet Weekly titation: Week 1: 25mg QAM Week 2: 25mg BID Week 3: 25mg QAM, 50mg QPM Week 4: 50mg BID Week 5: 50mg QAM, 75mg QPM Week 6: 75mg BID Week 7: 75mg QAM, 100mg QPM Week 8: 100mg BID 4/30/22   Brian Almeida MD   lidocaine (LIDODERM) 5 % Place 1 patch onto the skin daily 12 hours on, 12 hours off. 11/16/19   ANNA Yates - CNP   cyclobenzaprine (FLEXERIL) 10 MG tablet Take 1 tablet by mouth 3 times daily as needed for Muscle spasms 11/15/19   Gamaliel Yanez MD   benzocaine-menthol (CEPACOL SORE THROAT) 15-3.6 MG lozenge Take 1 lozenge by mouth every 2 hours as needed for Sore Throat 9/18/19   Torri [facility] Clements, PANaderC   diclofenac sodium 1 % GEL Apply 2 g topically 2 times daily 4/20/19   Sage Fabricio, DO   Elastic Bandages & Supports (KNEE BRACE ADJUSTABLE HINGED) MISC 1 Device by Does not apply route as needed (comfort) 12/19/18   Comfort [address] 16    Temp:       TempSrc:       SpO2:  94% 100% 96%   Weight: 248 lb (112.5 kg)      Height: 5' 8\" (1.727 m)            Intake/Output Summary (Last 24 hours) at 7/31/2022 1353  Last data filed at 7/31/2022 1211  Gross per 24 hour   Intake --   Output 750 ml   Net -750 ml     Physical Exam  Constitutional:       General: She is not in acute distress. Appearance: Normal appearance. HENT:      Head: Normocephalic and atraumatic. Mouth/Throat:      Mouth: Mucous membranes are moist.   Eyes:      Extraocular Movements: Extraocular movements intact. Cardiovascular:      Rate and Rhythm: Normal rate and regular rhythm. Heart sounds: No murmur heard. No gallop. Pulmonary:      Effort: Pulmonary effort is normal.      Breath sounds: Examination of the right-lower field reveals rales. Examination of the left-lower field reveals rales. Rales present. Comments: Satting 96% on high flow nasal cannula  Abdominal:      General: Abdomen is flat. Bowel sounds are normal.   Musculoskeletal:         General: Tenderness present. Right lower leg: Tenderness present. Left lower leg: Tenderness present. Comments: Patient very tender could be attributed to fibromyalgia   Skin:     General: Skin is warm. Neurological:      General: No focal deficit present. Mental Status: She is alert and oriented to person, place, and time. Psychiatric:         Mood and Affect: Mood normal.         Behavior: Behavior normal.      DIAGNOSTICS:      Laboratory Testing:    Recent Results (from the past 24 hour(s))   Culture, Blood 1    Collection Time: 07/31/22  8:45 AM    Specimen: Blood   Result Value Ref Range    Specimen Description . BLOOD     Special Requests 4ml rt wrist     Culture NO GROWTH <24 HRS    CBC with Auto Differential    Collection Time: 07/31/22  8:50 AM   Result Value Ref Range    WBC 10.5 3.5 - 11.3 k/uL    RBC 5.09 3.95 - 5.11 m/uL    Hemoglobin 11.7 (L) 11.9 - 15.1 g/dL    Hematocrit 39.4 36.3 - 47.1 %    MCV 77.4 (L) 82.6 - 102.9 fL    MCH 23.0 (L) 25.2 - 33.5 pg    MCHC 29.7 28.4 - 34.8 g/dL    RDW 15.2 (H) 11.8 - 14.4 %    Platelets 207 400 - 379 k/uL    MPV 10.8 8.1 - 13.5 fL    NRBC Automated 0.0 0.0 per 100 WBC    Immature Granulocytes 0 0 %    Seg Neutrophils 43 36 - 66 %    Lymphocytes 53 (H) 24 - 44 %    Monocytes 3 1 - 7 %    Eosinophils % 1 1 - 4 %    Basophils 0 0 - 2 %    Absolute Immature Granulocyte 0.00 0.00 - 0.30 k/uL    Segs Absolute 4.52 1.8 - 7.7 k/uL    Absolute Lymph # 5.55 (H) 1.0 - 4.8 k/uL    Absolute Mono # 0.32 0.1 - 0.8 k/uL    Absolute Eos # 0.11 0.0 - 0.4 k/uL    Basophils Absolute 0.00 0.0 - 0.2 k/uL    Morphology ANISOCYTOSIS PRESENT     Morphology MICROCYTOSIS PRESENT    Comprehensive Metabolic Panel    Collection Time: 07/31/22  8:50 AM   Result Value Ref Range    Glucose 282 (H) 70 - 99 mg/dL    BUN 9 6 - 20 mg/dL    Creatinine 0.72 0.50 - 0.90 mg/dL    Calcium 8.3 (L) 8.6 - 10.4 mg/dL    Sodium 135 135 - 144 mmol/L    Potassium 3.7 3.7 - 5.3 mmol/L    Chloride 101 98 - 107 mmol/L    CO2 12 (L) 20 - 31 mmol/L    Anion Gap 22 (H) 9 - 17 mmol/L    Alkaline Phosphatase 97 35 - 104 U/L    ALT 74 (H) 5 - 33 U/L     (H) <32 U/L    Total Bilirubin 0.35 0.3 - 1.2 mg/dL    Total Protein 7.1 6.4 - 8.3 g/dL    Albumin 3.6 3.5 - 5.2 g/dL    Albumin/Globulin Ratio 1.0 1.0 - 2.5    GFR Non-African American >60 >60 mL/min    GFR African American >60 >60 mL/min    GFR Comment         Magnesium    Collection Time: 07/31/22  8:50 AM   Result Value Ref Range    Magnesium 2.1 1.6 - 2.6 mg/dL   Brain Natriuretic Peptide    Collection Time: 07/31/22  8:50 AM   Result Value Ref Range    Pro- (H) <300 pg/mL   Troponin    Collection Time: 07/31/22  8:50 AM   Result Value Ref Range    Troponin, High Sensitivity 26 (H) 0 - 14 ng/L   Venous Blood Gas, POC    Collection Time: 07/31/22  8:58 AM   Result Value Ref Range    pH, Zander 7.142 (LL) 7.320 - 7.430    pCO2, Zander 48.6 41.0 - 51.0 mm Hg    pO2, Zander 53.7 (H) 30.0 - 50.0 mm Hg    HCO3, Venous 16.6 (L) 22.0 - 29.0 mmol/L    Negative Base Excess, Zander 12 (H) 0.0 - 2.0    O2 Sat, Zander 77 60.0 - 85.0 %   ELECTROLYTES PLUS    Collection Time: 07/31/22  8:58 AM   Result Value Ref Range    POC Sodium 141 138 - 146 mmol/L    POC Potassium 3.3 (L) 3.5 - 4.5 mmol/L    POC Chloride 108 (H) 98 - 107 mmol/L    POC TCO2 17 (L) 22 - 30 mmol/L    Anion Gap 17 (H) 7 - 16 mmol/L   Hemoglobin and hematocrit, blood    Collection Time: 07/31/22  8:58 AM   Result Value Ref Range    POC Hemoglobin 14.0 12.0 - 16.0 g/dL    POC Hematocrit 41 36 - 46 %   Creatinine W/GFR Point of Care    Collection Time: 07/31/22  8:58 AM   Result Value Ref Range    POC Creatinine 0.68 0.51 - 1.19 mg/dL    GFR Comment >60 >60 mL/min    GFR Non-African American >60 >60 mL/min    GFR Comment         CALCIUM, IONIC (POC)    Collection Time: 07/31/22  8:58 AM   Result Value Ref Range    POC Ionized Calcium 1.14 (L) 1.15 - 1.33 mmol/L   POCT urea (BUN)    Collection Time: 07/31/22  8:58 AM   Result Value Ref Range    POC BUN 9 8 - 26 mg/dL   Lactic Acid, POC    Collection Time: 07/31/22  8:58 AM   Result Value Ref Range    POC Lactic Acid 8.68 (H) 0.56 - 1.39 mmol/L   POCT Glucose    Collection Time: 07/31/22  8:58 AM   Result Value Ref Range    POC Glucose 270 (H) 74 - 100 mg/dL   Culture, Blood 1    Collection Time: 07/31/22  9:00 AM    Specimen: Blood   Result Value Ref Range    Specimen Description . BLOOD     Special Requests 10ML L FA     Culture NO GROWTH <24 HRS    COVID-19, Rapid    Collection Time: 07/31/22  9:07 AM    Specimen: Nasopharyngeal Swab   Result Value Ref Range    Specimen Description . NASOPHARYNGEAL SWAB     SARS-CoV-2, Rapid Not Detected Not Detected   Troponin    Collection Time: 07/31/22 10:44 AM   Result Value Ref Range    Troponin, High Sensitivity 137 (HH) 0 - 14 ng/L   CBC    Collection Time: 07/31/22 12:06 PM   Result Value Ref Range    WBC 12.5 (H) 3.5 - 11.3 k/uL    RBC 4.91 3.95 - 5.11 m/uL    Hemoglobin 11.1 (L) 11.9 - 15.1 g/dL    Hematocrit 36.6 36.3 - 47.1 %    MCV 74.5 (L) 82.6 - 102.9 fL    MCH 22.6 (L) 25.2 - 33.5 pg    MCHC 30.3 28.4 - 34.8 g/dL    RDW 15.1 (H) 11.8 - 14.4 %    Platelets 067 161 - 338 k/uL    MPV 11.0 8.1 - 13.5 fL    NRBC Automated 0.0 0.0 per 100 WBC   APTT    Collection Time: 07/31/22 12:06 PM   Result Value Ref Range    PTT 21.2 20.5 - 30.5 sec   Procalcitonin    Collection Time: 07/31/22 12:06 PM   Result Value Ref Range    Procalcitonin 0.16 (H) <0.09 ng/mL         Imaging/[employer]:  XR CHEST PORTABLE    Result Date: 7/31/2022  EXAMINATION: ONE XRAY VIEW OF THE CHEST 7/31/2022 8:50 am COMPARISON: 04/27/2022 HISTORY: ORDERING SYSTEM PROVIDED HISTORY: Shortness of Breath TECHNOLOGIST PROVIDED HISTORY: Shortness of Breath FINDINGS: Interval development of diffuse airspace opacities sparing the lung periphery. Pulmonary edema versus diffuse pneumonia. No pleural effusion. No pneumothorax. No acute bony abnormality. Interval development of diffuse airspace opacities sparing small portions of the lung periphery. Diffuse pneumonia versus pulmonary edema. CT CHEST PULMONARY EMBOLISM W CONTRAST    Result Date: 7/31/2022  EXAMINATION: CTA OF THE CHEST 7/31/2022 12:17 pm TECHNIQUE: CTA of the chest was performed after the administration of intravenous contrast.  Multiplanar reformatted images are provided for review. MIP images are provided for review. Automated exposure control, iterative reconstruction, and/or weight based adjustment of the mA/kV was utilized to reduce the radiation dose to as low as reasonably achievable. COMPARISON: 10/25/2021, 09/29/2016 HISTORY: ORDERING SYSTEM PROVIDED HISTORY: Pulmonary embolus rule out TECHNOLOGIST PROVIDED HISTORY: Pulmonary embolus rule out Reason for Exam: Pulmonary embolus rule out FINDINGS: Pulmonary Arteries: Pulmonary arteries are adequately opacified for evaluation.   No evidence of intraluminal filling defect to suggest pulmonary embolism. Main pulmonary artery is normal in caliber. Mediastinum: The thyroid is grossly unremarkable. There is mild mediastinal and bilateral hilar lymphadenopathy, most likely benign and reactive in etiology given the patient's lung findings. The intrathoracic aorta is grossly unremarkable, without evidence of aneurysm. There is a mild amount of pericardial fluid, without a significant pericardial effusion evident. Lungs/pleura: Central airways are patent. There are small bilateral pleural effusions, right greater than left. There is bibasilar predominant intralobular septal thickening, suggestive of bibasilar interstitial pulmonary edema. There is diffuse ground-glass opacity throughout both lungs, with a somewhat nodular appearance, most likely reflecting a combination of pulmonary edema and superimposed pneumonia. Mild multifocal consolidative opacity within the lingula and bilateral lower lobes likely reflects additional multifocal pneumonia. There is no evidence of a pneumothorax. There is a background of mild emphysema. No definite suspicious pulmonary nodule or mass is identified. Upper Abdomen: The patient is status post cholecystectomy. Adrenal glands are unremarkable bilaterally. Remainder of the upper abdomen is grossly unremarkable. Soft Tissues/Bones: There is multilevel degenerative change throughout the thoracic spine. No osteolytic or osteoblastic lesion is seen. 1. No CT evidence of a pulmonary embolism. 2. Small bilateral pleural effusions with bibasilar predominant interstitial pulmonary edema. 3. Diffuse scattered ground-glass and consolidative opacities throughout both lungs likely reflects a combination of pulmonary edema and multifocal pneumonia. 4. Mild mediastinal and bilateral hilar lymphadenopathy is most likely benign and reactive in etiology given the patient's lung findings.          ASSESSMENT: prophylaxis: already anticoagulated with HEPARIN DRIP  GI prophylaxis: Protonix 40 mg daily      Consultations:   Consults: IP CONSULT TO INTERNAL MEDICINE  IP CONSULT TO FAMILY MEDICINE  IP CONSULT TO CARDIOLOGY  IP CONSULT TO SOCIAL WORK  PT/OT  Above plan discussed with the patient , who agreed to the above plan     Plan will be discussed with the attending, Dr. Ellyn MD  Family Medicine Resident  7/31/2022 1:53 PM

## 2022-07-31 NOTE — ED PROVIDER NOTES
[facility] ED  Emergency Department Encounter  Emergency Medicine Resident     Pt Jeffery Hi  MRN: [id number]  [place] 1970  Date of evaluation: 7/31/22  PCP:  Delma Linares MD      [address] [place] [address]       Chief Complaint   Patient presents with    Shortness of Breath       HISTORY OF PRESENT ILLNESS  (Location/Symptom, Timing/Onset, Context/Setting, Quality, Duration, Modifying Factors, Severity.)      Thaddeus Bamberger is a 45 y.o. female who presents with breath since yesterday. Patient states she has a history of asthma and used an inhaler yesterday for the first time in 2 years. Denies history of heart failure. Does not wear home O2, no recent sick contacts. Per EMS a friend went to check on her this morning and found her to have shortness of breath and vomiting and called 911. On presentation saturating 68% on bipap, in respiratory distress started on BiPAP here. 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 has a past surgical history that includes Hysterectomy; Breast surgery; Tonsillectomy; Adenoidectomy; sinus surgery; Cosmetic surgery; knee surgery; and Cardiac catheterization (7-).       Social History     Socioeconomic History    Marital status: Single     Spouse name: Not on file    Number of children: Not on file    Years of education: Not on file    Highest education level: Not on file   Occupational History    Not on file   Tobacco Use    Smoking status: Never    Smokeless tobacco: Never   Vaping Use    Vaping Use: Never used   Substance and Sexual Activity    Alcohol use: Yes     Comment: occasionally    Drug use: Yes     Types: Marijuana Karlene Eglin)    Sexual activity: Not on file   Other Topics Concern    Not on file   Social History Narrative    Barrier to care due to finances and no insurance. Social Determinants of Health     Financial Resource Strain: Not on file   Food Insecurity: Not on file   Transportation Needs: Not on file   Physical Activity: Not on file   Stress: Not on file   Social Connections: Not on file   Intimate Partner Violence: Not on file   Housing Stability: Not on file       Family History   Problem Relation Age of Onset    Cancer Mother         breast and bone    Heart Disease Father     Diabetes Sister     High Blood Pressure Sister     Diabetes Brother     High Blood Pressure Brother     Heart Disease Maternal Grandmother     Cancer Maternal Grandmother         breast    Cancer Paternal Grandmother     Heart Disease Paternal Grandfather        Allergies:  Latex, Motrin [ibuprofen], Norco [hydrocodone-acetaminophen], Tramadol, Naproxen, and Tylenol [acetaminophen]    Home Medications:  Prior to Admission medications    Medication Sig Start Date End Date Taking? Authorizing Provider   lamoTRIgine (LAMICTAL) 100 MG tablet Take 1 tablet by mouth in the morning and 1 tablet before bedtime.  7/30/22   Paz Almanzar MD   losartan (COZAAR) 50 mg tablet Take 1 tablet by mouth in the morning and at bedtime 6/10/22   Rob Majano MD   lamoTRIgine (LAMICTAL) 25 MG tablet Weekly titation: Week 1: 25mg QAM Week 2: 25mg BID Week 3: 25mg QAM, 50mg QPM Week 4: 50mg BID Week 5: 50mg QAM, 75mg QPM Week 6: 75mg BID Week 7: 75mg QAM, 100mg QPM Week 8: 100mg BID 4/30/22   Tra Alvarez MD   lidocaine (LIDODERM) 5 % Place 1 patch onto the skin daily 12 hours on, 12 hours off. 11/16/19   Doris Resendiz APRN - CNP   cyclobenzaprine (FLEXERIL) 10 MG tablet Take 1 tablet by mouth 3 times daily as needed for Muscle spasms 11/15/19   Emanuel Sosa MD   benzocaine-menthol (CEPACOL SORE THROAT) 15-3.6 MG lozenge Take 1 lozenge by mouth every 2 hours as needed for Sore Throat 9/18/19   Ben Andujar ANA LAURA Granados PA-C   diclofenac sodium 1 % GEL Apply 2 g topically 2 times daily 4/20/19   Taina [place],    Elastic Bandages & Supports (KNEE BRACE ADJUSTABLE HINGED) MISC 1 Device by Does not apply route as needed (comfort) 12/19/18   Danis Lara MD   dicyclomine (BENTYL) 10 MG capsule Take 1 capsule by mouth every 6 hours as needed (cramps) 5/26/18   Taina Mckenzie DO   Misc. Devices (WRIST BRACE) MISC 1 Device by Does not apply route daily One brace to be worn at night and daily as needed for carpal tunnel syndrome. Brace should place the wrist in neutral. 2/20/17   Derek Oconnell DO   EPINEPHrine (EPIPEN) 0.3 MG/0.3ML SOAJ injection Inject 0.3 mg into the muscle as needed Use as directed for allergic reaction    Historical Provider, MD   Elastic Bandages & Supports ([address]) MISC 1 Device by Does not apply route as needed 6/2/15   Arslan Hillman MD   ondansetron (ZOFRAN) 4 MG tablet Take 1 tablet by mouth every 8 hours as needed for Nausea. 8/8/14   Bartley Lennox A Mathios, DO   indomethacin (INDOCIN) 50 MG capsule Take 1 capsule by mouth 3 times daily (with meals). 7/26/14   Augustina Nair MD       REVIEW OF SYSTEMS    (2-9 systems for level 4, 10 or more for level 5)      Review of Systems   Constitutional:  Positive for fatigue. Negative for chills and fever. HENT:  Negative for congestion. Respiratory:  Positive for chest tightness and shortness of breath. Negative for cough. Cardiovascular:  Negative for chest pain, palpitations and leg swelling. Gastrointestinal:  Positive for nausea and vomiting. Negative for abdominal pain. Genitourinary:  Negative for dysuria and frequency. Allergic/Immunologic: Negative for immunocompromised state. Neurological:  Positive for light-headedness. Negative for dizziness. Hematological:  Does not bruise/bleed easily. Psychiatric/Behavioral:  Negative for agitation.       PHYSICAL EXAM   (up to 7 for level 4, 8 or more for level 5) monitoring - continuous duration    Height and weight    Inpatient consult to Internal Medicine    Inpatient consult to [facility]    Inpatient consult to Cardiology    Adult NIV/Positive Airway Pressure    Nasal Cannula Oxygen    Pulse oximetry, continuous    Initiate Oxygen Therapy Protocol    Venous Blood Gas, POC    Creatinine W/GFR Point of Care    POCT urea (BUN)    Lactic Acid, POC    POCT Glucose    POC Blood Gas and Chemistry    EKG 12 Lead    EKG 12 Lead    Saline lock IV    ADMIT TO INPATIENT    ADMIT TO INPATIENT       MEDICATIONS ORDERED:  Orders Placed This Encounter   Medications    ondansetron (ZOFRAN) 4 MG/2ML injection     CHIDI MARSH: cabinet override    nitroGLYCERIN 50 mg in dextrose 5% 250 mL infusion     Order Specific Question:   Titrate Infusion? Answer:   Yes     Order Specific Question:   Initial Infusion Dose: Answer:   5 mcg/min     Order Specific Question:   Goal of Therapy is: Answer:    Other     Order Specific Question:   Other Goal:     Answer:   shortness of breath     Order Specific Question:   Contact Provider if:     Answer:   SBP less than 90 mmHg    furosemide (LASIX) injection 20 mg    ondansetron (ZOFRAN) injection 4 mg    ondansetron (ZOFRAN) injection 4 mg    potassium bicarb-citric acid (EFFER-K) effervescent tablet 40 mEq    aspirin chewable tablet 324 mg    heparin (porcine) injection 9,200 Units    heparin (porcine) injection 9,200 Units    heparin (porcine) injection 4,600 Units    heparin 25,000 units in dextrose 5 % 250 mL infusion (rate based)    cefTRIAXone (ROCEPHIN) 1,000 mg in sodium chloride 0.9 % 50 mL IVPB mini-bag     Order Specific Question:   Antimicrobial Indications     Answer:   Upper Respiratory Infection     Order Specific Question:   URI duration of therapy     Answer:   5 days    azithromycin (ZITHROMAX) 250 mg in dextrose 5 % 250 mL IVPB     Order Specific Question:   Antimicrobial Indications     Answer:   Upper Respiratory Infection     Order Specific Question:   URI duration of therapy     Answer:   5 days       DDX: CHF exacerbation, COPD exacerbation, COVID, pneumonia, asthma, ARDS, PE, ACS- STEMI, NSTEMI, unstable angina    DIAGNOSTIC RESULTS / EMERGENCY DEPARTMENT COURSE / MDM   LAB RESULTS:  Results for orders placed or performed during the hospital encounter of 07/31/22   COVID-19, Rapid    Specimen: Nasopharyngeal Swab   Result Value Ref Range    Specimen Description . NASOPHARYNGEAL SWAB     SARS-CoV-2, Rapid Not Detected Not Detected   CBC with Auto Differential   Result Value Ref Range    WBC 10.5 3.5 - 11.3 k/uL    RBC 5.09 3.95 - 5.11 m/uL    Hemoglobin 11.7 (L) 11.9 - 15.1 g/dL    Hematocrit 39.4 36.3 - 47.1 %    MCV 77.4 (L) 82.6 - 102.9 fL    MCH 23.0 (L) 25.2 - 33.5 pg    MCHC 29.7 28.4 - 34.8 g/dL    RDW 15.2 (H) 11.8 - 14.4 %    Platelets 391 537 - 505 k/uL    MPV 10.8 8.1 - 13.5 fL    NRBC Automated 0.0 0.0 per 100 WBC    Immature Granulocytes 0 0 %    Seg Neutrophils 43 36 - 66 %    Lymphocytes 53 (H) 24 - 44 %    Monocytes 3 1 - 7 %    Eosinophils % 1 1 - 4 %    Basophils 0 0 - 2 %    Absolute Immature Granulocyte 0.00 0.00 - 0.30 k/uL    Segs Absolute 4.52 1.8 - 7.7 k/uL    Absolute Lymph # 5.55 (H) 1.0 - 4.8 k/uL    Absolute Mono # 0.32 0.1 - 0.8 k/uL    Absolute Eos # 0.11 0.0 - 0.4 k/uL    Basophils Absolute 0.00 0.0 - 0.2 k/uL    Morphology ANISOCYTOSIS PRESENT     Morphology MICROCYTOSIS PRESENT    Comprehensive Metabolic Panel   Result Value Ref Range    Glucose 282 (H) 70 - 99 mg/dL    BUN 9 6 - 20 mg/dL    Creatinine 0.72 0.50 - 0.90 mg/dL    Calcium 8.3 (L) 8.6 - 10.4 mg/dL    Sodium 135 135 - 144 mmol/L    Potassium 3.7 3.7 - 5.3 mmol/L    Chloride 101 98 - 107 mmol/L    CO2 12 (L) 20 - 31 mmol/L    Anion Gap 22 (H) 9 - 17 mmol/L    Alkaline Phosphatase 97 35 - 104 U/L    ALT 74 (H) 5 - 33 U/L     (H) <32 U/L    Total Bilirubin 0.35 0.3 - 1.2 mg/dL    Total Protein 7.1 6.4 - 8.3 g/dL    Albumin 3.6 3.5 - 5.2 g/dL    Albumin/Globulin Ratio 1.0 1.0 - 2.5    GFR Non-African American >60 >60 mL/min    GFR African American >60 >60 mL/min    GFR Comment         Magnesium   Result Value Ref Range    Magnesium 2.1 1.6 - 2.6 mg/dL   Brain Natriuretic Peptide   Result Value Ref Range    Pro- (H) <300 pg/mL   ELECTROLYTES PLUS   Result Value Ref Range    POC Sodium 141 138 - 146 mmol/L    POC Potassium 3.3 (L) 3.5 - 4.5 mmol/L    POC Chloride 108 (H) 98 - 107 mmol/L    POC TCO2 17 (L) 22 - 30 mmol/L    Anion Gap 17 (H) 7 - 16 mmol/L   Hemoglobin and hematocrit, blood   Result Value Ref Range    POC Hemoglobin 14.0 12.0 - 16.0 g/dL    POC Hematocrit 41 36 - 46 %   CALCIUM, IONIC (POC)   Result Value Ref Range    POC Ionized Calcium 1.14 (L) 1.15 - 1.33 mmol/L   Troponin   Result Value Ref Range    Troponin, High Sensitivity 26 (H) 0 - 14 ng/L   Troponin   Result Value Ref Range    Troponin, High Sensitivity 137 (HH) 0 - 14 ng/L   Venous Blood Gas, POC   Result Value Ref Range    pH, Zander 7.142 (LL) 7.320 - 7.430    pCO2, Zander 48.6 41.0 - 51.0 mm Hg    pO2, Zander 53.7 (H) 30.0 - 50.0 mm Hg    HCO3, Venous 16.6 (L) 22.0 - 29.0 mmol/L    Negative Base Excess, Zander 12 (H) 0.0 - 2.0    O2 Sat, Zander 77 60.0 - 85.0 %   Creatinine W/GFR Point of Care   Result Value Ref Range    POC Creatinine 0.68 0.51 - 1.19 mg/dL    GFR Comment >60 >60 mL/min    GFR Non-African American >60 >60 mL/min    GFR Comment         POCT urea (BUN)   Result Value Ref Range    POC BUN 9 8 - 26 mg/dL   Lactic Acid, POC   Result Value Ref Range    POC Lactic Acid 8.68 (H) 0.56 - 1.39 mmol/L   POCT Glucose   Result Value Ref Range    POC Glucose 270 (H) 74 - 100 mg/dL       IMPRESSION: Patient is a 48yo F in respiratory distress on BiPAP saturating 68% on arrival.  Respirations improved on BiPAP here. Will obtain chest pain and shortness of breath work-up including chest x-ray, EKG, lab work and point-of-care blood gas.   Patient with bibasilar crackles on exam.  Anticipate patient will need admitted to stepdown versus ICU    RADIOLOGY:  XR CHEST PORTABLE   Final Result   Interval development of diffuse airspace opacities sparing small portions of   the lung periphery. Diffuse pneumonia versus pulmonary edema. CT CHEST PULMONARY EMBOLISM W CONTRAST    (Results Pending)   VL DUP LOWER EXTREMITY VENOUS BILATERAL    (Results Pending)         EKG  Sinus tachycardia, PACs    All EKG's are interpreted by the Emergency Department Physician who either signs or Co-signs this chart in the absence of a cardiologist.    EMERGENCY DEPARTMENT COURSE:      ED Course as of 07/31/22 1208   Sun Jul 31, 2022   0913 Venous Blood Gas, POC(!!):    pH, Zander 7.142(!!)   pCO2, Zander 48.6   pO2, Zander 53.7(!)   HCO3, Venous 16.6(!)   Negative Base Excess, Zander 12(!)   O2 Sat, Zander 77  Mixed resp and metabolic acidosis  [SK]   0916 Lactic Acid, POC(!):    POC Lactic Acid 8.68(!)  8.68 [SK]   0916 CALCIUM, IONIC (POC)(! ):    POC Ionized Calcium 1.14(!) [SK]   0916 POCT Glucose(!):    POC Glucose 270(!) [SK]   0916 ELECTROLYTES PLUS(!):    POC Sodium 141   POC Potassium 3.3(!)   POC Chloride 108(!)   POC TCO2 17(!)   Anion Gap 17(!) [SK]   0932 Brain Natriuretic Peptide(!):    Pro-(!)  603 [SK]   0954 XR CHEST PORTABLE  Diffuse pneumonia versus pulmonary edema. [SK]   3131 Patient placed on high flow NC given repeated emesis into BiPAP mask  [SK]   1000 COVID-19, Rapid:    Specimen Description . NASOPHARYNGEAL SWAB   SARS-CoV-2, Rapid Not Detected  Negative   [SK]   1000 Troponin(!):    Troponin, High Sensitivity 26(!)  26 [SK]   1043 Discussed patient admission with family medicine. Family medicine will admit patient for respiratory work-up and increasing oxygen requirement.  [SK]   1134 Troponin(!!):    Troponin, High Sensitivity 137(!!)  137 [SK]      ED Course User Index  [SK] Niki Anthony, DO       No notes of EC Admission Criteria type on file.    PROCEDURES:      CONSULTS:  IP CONSULT TO INTERNAL MEDICINE  IP CONSULT TO FAMILY MEDICINE  IP CONSULT TO CARDIOLOGY    CRITICAL CARE:  BIPAP placed on presentation, saturating 68% on arrival     ED Course as of 07/31/22 1208   Sun Jul 31, 2022   0913 Venous Blood Gas, POC(!!):    pH, Zander 7.142(!!)   pCO2, Zander 48.6   pO2, Zander 53.7(!)   HCO3, Venous 16.6(!)   Negative Base Excess, Zander 12(!)   O2 Sat, Zander 77  Mixed resp and metabolic acidosis  [SK]   0916 Lactic Acid, POC(!):    POC Lactic Acid 8.68(!)  8.68 [SK]   0916 CALCIUM, IONIC (POC)(! ):    POC Ionized Calcium 1.14(!) [SK]   0916 POCT Glucose(!):    POC Glucose 270(!) [SK]   0916 ELECTROLYTES PLUS(!):    POC Sodium 141   POC Potassium 3.3(!)   POC Chloride 108(!)   POC TCO2 17(!)   Anion Gap 17(!) [SK]   0932 Brain Natriuretic Peptide(!):    Pro-(!)  603 [SK]   0954 XR CHEST PORTABLE  Diffuse pneumonia versus pulmonary edema. [SK]   6358 Patient placed on high flow NC given repeated emesis into BiPAP mask  [SK]   1000 COVID-19, Rapid:    Specimen Description . NASOPHARYNGEAL SWAB   SARS-CoV-2, Rapid Not Detected  Negative   [SK]   1000 Troponin(!):    Troponin, High Sensitivity 26(!)  26 [SK]   1043 Discussed patient admission with family medicine. Family medicine will admit patient for respiratory work-up and increasing oxygen requirement. [SK]   1134 Troponin(!!):    Troponin, High Sensitivity 137(!!)  137 [SK]      ED Course User Index  [SK] Sherri Manuel DO       FINAL IMPRESSION      1. Respiratory distress    2. Acute pulmonary edema ([facility] [address]          DISPOSITION / PLAN     DISPOSITION Admitted 07/31/2022 10:58:33 AM      PATIENT REFERRED TO:  No follow-up provider specified.     DISCHARGE MEDICATIONS:  New Prescriptions    No medications on file       Sherri Manuel DO  Emergency Medicine Resident    (Please note that portions of thisnote were completed with a voice recognition program.  Efforts were made to edit the dictations but occasionally words are mis-transcribed.)       Winferd DO Kishore  Resident  07/31/22 9106

## 2022-07-31 NOTE — ED NOTES
Pt removed from BIPAP at this time, place on 4L NC with saturation of 85% oxygen increased to 6L with improvement to 89% while respiratory sets up high flow     Ly Riley RN  07/31/22 7625 Island Pedicle Flap-Requiring Vessel Identification Text: The defect edges were debeveled with a #15 scalpel blade.  Given the location of the defect, shape of the defect and the proximity to free margins an island pedicle advancement flap was deemed most appropriate.  Using a sterile surgical marker, an appropriate advancement flap was drawn, based on the axial vessel mentioned above, incorporating the defect, outlining the appropriate donor tissue and placing the expected incisions within the relaxed skin tension lines where possible.    The area thus outlined was incised deep to adipose tissue with a #15 scalpel blade.  The skin margins were undermined to an appropriate distance in all directions around the primary defect and laterally outward around the island pedicle utilizing iris scissors.  There was minimal undermining beneath the pedicle flap.

## 2022-07-31 NOTE — ED NOTES
Urine sample collected from pt,  labeled and sent to lab for testing.       Larry Tidwell RN  07/31/22 5505

## 2022-08-01 PROBLEM — J18.9 PNEUMONIA OF BOTH LUNGS DUE TO INFECTIOUS ORGANISM: Status: ACTIVE | Noted: 2022-08-01

## 2022-08-01 PROBLEM — J81.0 ACUTE PULMONARY EDEMA (HCC): Status: ACTIVE | Noted: 2022-08-01

## 2022-08-01 LAB
ABSOLUTE EOS #: 0.09 K/UL (ref 0–0.44)
ABSOLUTE IMMATURE GRANULOCYTE: 0.03 K/UL (ref 0–0.3)
ABSOLUTE LYMPH #: 3.05 K/UL (ref 1.1–3.7)
ABSOLUTE MONO #: 0.45 K/UL (ref 0.1–1.2)
ANION GAP SERPL CALCULATED.3IONS-SCNC: 13 MMOL/L (ref 9–17)
BASOPHILS # BLD: 0 % (ref 0–2)
BASOPHILS ABSOLUTE: 0.03 K/UL (ref 0–0.2)
BUN BLDV-MCNC: 8 MG/DL (ref 6–20)
CALCIUM SERPL-MCNC: 8.6 MG/DL (ref 8.6–10.4)
CHLORIDE BLD-SCNC: 102 MMOL/L (ref 98–107)
CO2: 23 MMOL/L (ref 20–31)
CREAT SERPL-MCNC: 0.54 MG/DL (ref 0.5–0.9)
CULTURE: NORMAL
EKG ATRIAL RATE: 87 BPM
EKG P AXIS: 9 DEGREES
EKG P-R INTERVAL: 174 MS
EKG Q-T INTERVAL: 402 MS
EKG QRS DURATION: 80 MS
EKG QTC CALCULATION (BAZETT): 483 MS
EKG R AXIS: 41 DEGREES
EKG T AXIS: 70 DEGREES
EKG VENTRICULAR RATE: 87 BPM
EOSINOPHILS RELATIVE PERCENT: 1 % (ref 1–4)
GFR AFRICAN AMERICAN: >60 ML/MIN
GFR NON-AFRICAN AMERICAN: >60 ML/MIN
GFR SERPL CREATININE-BSD FRML MDRD: ABNORMAL ML/MIN/{1.73_M2}
GLUCOSE BLD-MCNC: 100 MG/DL (ref 65–105)
GLUCOSE BLD-MCNC: 101 MG/DL (ref 65–105)
GLUCOSE BLD-MCNC: 109 MG/DL (ref 65–105)
GLUCOSE BLD-MCNC: 94 MG/DL (ref 65–105)
GLUCOSE BLD-MCNC: 94 MG/DL (ref 70–99)
HCT VFR BLD CALC: 32.1 % (ref 36.3–47.1)
HEMOGLOBIN: 9.9 G/DL (ref 11.9–15.1)
IMMATURE GRANULOCYTES: 0 %
LYMPHOCYTES # BLD: 41 % (ref 24–43)
MAGNESIUM: 1.8 MG/DL (ref 1.6–2.6)
MAGNESIUM: 1.9 MG/DL (ref 1.6–2.6)
MCH RBC QN AUTO: 22.8 PG (ref 25.2–33.5)
MCHC RBC AUTO-ENTMCNC: 30.8 G/DL (ref 28.4–34.8)
MCV RBC AUTO: 74 FL (ref 82.6–102.9)
MONOCYTES # BLD: 6 % (ref 3–12)
NRBC AUTOMATED: 0 PER 100 WBC
PARTIAL THROMBOPLASTIN TIME: 33.6 SEC (ref 20.5–30.5)
PARTIAL THROMBOPLASTIN TIME: 69.8 SEC (ref 20.5–30.5)
PARTIAL THROMBOPLASTIN TIME: 71.9 SEC (ref 20.5–30.5)
PARTIAL THROMBOPLASTIN TIME: 86.7 SEC (ref 20.5–30.5)
PDW BLD-RTO: 14.9 % (ref 11.8–14.4)
PLATELET # BLD: 215 K/UL (ref 138–453)
PMV BLD AUTO: 11.2 FL (ref 8.1–13.5)
POTASSIUM SERPL-SCNC: 3.5 MMOL/L (ref 3.7–5.3)
RBC # BLD: 4.34 M/UL (ref 3.95–5.11)
RBC # BLD: ABNORMAL 10*6/UL
SEG NEUTROPHILS: 51 % (ref 36–65)
SEGMENTED NEUTROPHILS ABSOLUTE COUNT: 3.76 K/UL (ref 1.5–8.1)
SODIUM BLD-SCNC: 138 MMOL/L (ref 135–144)
SPECIMEN DESCRIPTION: NORMAL
TROPONIN, HIGH SENSITIVITY: 31 NG/L (ref 0–14)
TROPONIN, HIGH SENSITIVITY: 39 NG/L (ref 0–14)
TROPONIN, HIGH SENSITIVITY: 48 NG/L (ref 0–14)
TROPONIN, HIGH SENSITIVITY: 56 NG/L (ref 0–14)
TROPONIN, HIGH SENSITIVITY: 69 NG/L (ref 0–14)
WBC # BLD: 7.4 K/UL (ref 3.5–11.3)

## 2022-08-01 PROCEDURE — 85730 THROMBOPLASTIN TIME PARTIAL: CPT

## 2022-08-01 PROCEDURE — 2580000003 HC RX 258

## 2022-08-01 PROCEDURE — 6360000002 HC RX W HCPCS: Performed by: SURGERY

## 2022-08-01 PROCEDURE — 2060000000 HC ICU INTERMEDIATE R&B

## 2022-08-01 PROCEDURE — 6370000000 HC RX 637 (ALT 250 FOR IP): Performed by: STUDENT IN AN ORGANIZED HEALTH CARE EDUCATION/TRAINING PROGRAM

## 2022-08-01 PROCEDURE — 36415 COLL VENOUS BLD VENIPUNCTURE: CPT

## 2022-08-01 PROCEDURE — 2500000003 HC RX 250 WO HCPCS: Performed by: FAMILY MEDICINE

## 2022-08-01 PROCEDURE — 93970 EXTREMITY STUDY: CPT

## 2022-08-01 PROCEDURE — 6370000000 HC RX 637 (ALT 250 FOR IP)

## 2022-08-01 PROCEDURE — 93005 ELECTROCARDIOGRAM TRACING: CPT | Performed by: EMERGENCY MEDICINE

## 2022-08-01 PROCEDURE — 6360000002 HC RX W HCPCS

## 2022-08-01 PROCEDURE — 97166 OT EVAL MOD COMPLEX 45 MIN: CPT

## 2022-08-01 PROCEDURE — 99233 SBSQ HOSP IP/OBS HIGH 50: CPT | Performed by: FAMILY MEDICINE

## 2022-08-01 PROCEDURE — 6370000000 HC RX 637 (ALT 250 FOR IP): Performed by: SURGERY

## 2022-08-01 PROCEDURE — 6360000002 HC RX W HCPCS: Performed by: FAMILY MEDICINE

## 2022-08-01 PROCEDURE — 82947 ASSAY GLUCOSE BLOOD QUANT: CPT

## 2022-08-01 PROCEDURE — 83735 ASSAY OF MAGNESIUM: CPT

## 2022-08-01 PROCEDURE — 80048 BASIC METABOLIC PNL TOTAL CA: CPT

## 2022-08-01 PROCEDURE — 2500000003 HC RX 250 WO HCPCS: Performed by: SURGERY

## 2022-08-01 PROCEDURE — 6360000002 HC RX W HCPCS: Performed by: STUDENT IN AN ORGANIZED HEALTH CARE EDUCATION/TRAINING PROGRAM

## 2022-08-01 PROCEDURE — 97535 SELF CARE MNGMENT TRAINING: CPT

## 2022-08-01 PROCEDURE — 85025 COMPLETE CBC W/AUTO DIFF WBC: CPT

## 2022-08-01 PROCEDURE — 84484 ASSAY OF TROPONIN QUANT: CPT

## 2022-08-01 PROCEDURE — 93010 ELECTROCARDIOGRAM REPORT: CPT | Performed by: INTERNAL MEDICINE

## 2022-08-01 PROCEDURE — 2500000003 HC RX 250 WO HCPCS

## 2022-08-01 RX ORDER — FENTANYL CITRATE 50 UG/ML
50 INJECTION, SOLUTION INTRAMUSCULAR; INTRAVENOUS ONCE
Status: COMPLETED | OUTPATIENT
Start: 2022-08-01 | End: 2022-08-01

## 2022-08-01 RX ORDER — POTASSIUM CHLORIDE 750 MG/1
40 CAPSULE, EXTENDED RELEASE ORAL ONCE
Status: DISCONTINUED | OUTPATIENT
Start: 2022-08-01 | End: 2022-08-03 | Stop reason: HOSPADM

## 2022-08-01 RX ORDER — POTASSIUM CHLORIDE 20 MEQ/1
40 TABLET, EXTENDED RELEASE ORAL ONCE
Status: DISCONTINUED | OUTPATIENT
Start: 2022-08-01 | End: 2022-08-03 | Stop reason: HOSPADM

## 2022-08-01 RX ORDER — NITROGLYCERIN 20 MG/100ML
3 INJECTION INTRAVENOUS CONTINUOUS
Status: DISCONTINUED | OUTPATIENT
Start: 2022-08-01 | End: 2022-08-02

## 2022-08-01 RX ORDER — POTASSIUM CHLORIDE 20 MEQ/1
40 TABLET, EXTENDED RELEASE ORAL ONCE
Status: DISCONTINUED | OUTPATIENT
Start: 2022-08-01 | End: 2022-08-01

## 2022-08-01 RX ORDER — MORPHINE SULFATE 2 MG/ML
2 INJECTION, SOLUTION INTRAMUSCULAR; INTRAVENOUS EVERY 4 HOURS PRN
Status: DISCONTINUED | OUTPATIENT
Start: 2022-08-01 | End: 2022-08-03 | Stop reason: HOSPADM

## 2022-08-01 RX ORDER — MAGNESIUM SULFATE IN WATER 40 MG/ML
2000 INJECTION, SOLUTION INTRAVENOUS ONCE
Status: COMPLETED | OUTPATIENT
Start: 2022-08-01 | End: 2022-08-01

## 2022-08-01 RX ORDER — POTASSIUM CHLORIDE 20 MEQ/1
40 TABLET, EXTENDED RELEASE ORAL PRN
Status: DISCONTINUED | OUTPATIENT
Start: 2022-08-01 | End: 2022-08-03 | Stop reason: HOSPADM

## 2022-08-01 RX ORDER — POTASSIUM CHLORIDE 7.45 MG/ML
10 INJECTION INTRAVENOUS PRN
Status: DISCONTINUED | OUTPATIENT
Start: 2022-08-01 | End: 2022-08-03 | Stop reason: HOSPADM

## 2022-08-01 RX ORDER — DIPHENHYDRAMINE HCL 25 MG
25 TABLET ORAL EVERY 6 HOURS PRN
Status: DISCONTINUED | OUTPATIENT
Start: 2022-08-01 | End: 2022-08-03 | Stop reason: HOSPADM

## 2022-08-01 RX ADMIN — HEPARIN SODIUM 14 UNITS/KG/HR: 5000 INJECTION, SOLUTION INTRAVENOUS; SUBCUTANEOUS at 22:33

## 2022-08-01 RX ADMIN — DESMOPRESSIN ACETATE 40 MG: 0.2 TABLET ORAL at 20:49

## 2022-08-01 RX ADMIN — FUROSEMIDE 20 MG: 10 INJECTION, SOLUTION INTRAMUSCULAR; INTRAVENOUS at 17:08

## 2022-08-01 RX ADMIN — METOPROLOL TARTRATE 25 MG: 25 TABLET ORAL at 20:49

## 2022-08-01 RX ADMIN — MORPHINE SULFATE 2 MG: 2 INJECTION, SOLUTION INTRAMUSCULAR; INTRAVENOUS at 21:12

## 2022-08-01 RX ADMIN — LAMOTRIGINE 100 MG: 100 TABLET ORAL at 17:08

## 2022-08-01 RX ADMIN — MAGNESIUM SULFATE HEPTAHYDRATE 2000 MG: 40 INJECTION, SOLUTION INTRAVENOUS at 13:47

## 2022-08-01 RX ADMIN — HEPARIN SODIUM 4600 UNITS: 1000 INJECTION INTRAVENOUS; SUBCUTANEOUS at 17:00

## 2022-08-01 RX ADMIN — SODIUM CHLORIDE, PRESERVATIVE FREE 10 ML: 5 INJECTION INTRAVENOUS at 09:54

## 2022-08-01 RX ADMIN — ASPIRIN 81 MG: 81 TABLET, CHEWABLE ORAL at 09:39

## 2022-08-01 RX ADMIN — LAMOTRIGINE 100 MG: 100 TABLET ORAL at 06:15

## 2022-08-01 RX ADMIN — FENTANYL CITRATE 50 MCG: 50 INJECTION, SOLUTION INTRAMUSCULAR; INTRAVENOUS at 08:27

## 2022-08-01 RX ADMIN — NITROGLYCERIN 3 MCG/MIN: 20 INJECTION INTRAVENOUS at 13:50

## 2022-08-01 RX ADMIN — ONDANSETRON 4 MG: 2 INJECTION INTRAMUSCULAR; INTRAVENOUS at 22:29

## 2022-08-01 RX ADMIN — SODIUM CHLORIDE, PRESERVATIVE FREE 10 ML: 5 INJECTION INTRAVENOUS at 20:56

## 2022-08-01 RX ADMIN — CEFTRIAXONE SODIUM 1000 MG: 10 INJECTION, POWDER, FOR SOLUTION INTRAVENOUS at 13:31

## 2022-08-01 RX ADMIN — POTASSIUM BICARBONATE 40 MEQ: 782 TABLET, EFFERVESCENT ORAL at 14:06

## 2022-08-01 RX ADMIN — DIPHENHYDRAMINE HCL 25 MG: 25 TABLET ORAL at 21:13

## 2022-08-01 RX ADMIN — SODIUM CHLORIDE, PRESERVATIVE FREE 20 MG: 5 INJECTION INTRAVENOUS at 09:41

## 2022-08-01 RX ADMIN — HEPARIN SODIUM 12 UNITS/KG/HR: 5000 INJECTION, SOLUTION INTRAVENOUS; SUBCUTANEOUS at 01:51

## 2022-08-01 RX ADMIN — METOPROLOL TARTRATE 12.5 MG: 25 TABLET ORAL at 09:39

## 2022-08-01 RX ADMIN — AZITHROMYCIN MONOHYDRATE 250 MG: 500 INJECTION, POWDER, LYOPHILIZED, FOR SOLUTION INTRAVENOUS at 11:41

## 2022-08-01 RX ADMIN — SODIUM CHLORIDE, PRESERVATIVE FREE 20 MG: 5 INJECTION INTRAVENOUS at 20:49

## 2022-08-01 RX ADMIN — FUROSEMIDE 20 MG: 10 INJECTION, SOLUTION INTRAMUSCULAR; INTRAVENOUS at 09:40

## 2022-08-01 ASSESSMENT — PAIN DESCRIPTION - ORIENTATION
ORIENTATION: MID
ORIENTATION: ANTERIOR
ORIENTATION: MID
ORIENTATION: ANTERIOR

## 2022-08-01 ASSESSMENT — PAIN DESCRIPTION - LOCATION
LOCATION: CHEST

## 2022-08-01 ASSESSMENT — ENCOUNTER SYMPTOMS
CHEST TIGHTNESS: 0
SHORTNESS OF BREATH: 0

## 2022-08-01 ASSESSMENT — PAIN DESCRIPTION - DESCRIPTORS
DESCRIPTORS: PRESSURE

## 2022-08-01 ASSESSMENT — PAIN SCALES - GENERAL
PAINLEVEL_OUTOF10: 8
PAINLEVEL_OUTOF10: 6
PAINLEVEL_OUTOF10: 8
PAINLEVEL_OUTOF10: 6
PAINLEVEL_OUTOF10: 8
PAINLEVEL_OUTOF10: 4
PAINLEVEL_OUTOF10: 8
PAINLEVEL_OUTOF10: 6
PAINLEVEL_OUTOF10: 4
PAINLEVEL_OUTOF10: 6
PAINLEVEL_OUTOF10: 8
PAINLEVEL_OUTOF10: 6

## 2022-08-01 NOTE — PROGRESS NOTES
[facility]  Progress Note    Date:   8/1/2022  Patient name:  Viridiana Lee  Date of admission:  7/31/2022  8:36 AM  MRN:   [id number]  YOB: 1970    SUBJECTIVE/Last 24 hours update:     Patient seen and examined at the bed side , no new acute events overnight. Pt had complains of SOB overnight, on 3L NC, no distress this morning. Vitals stable. Complains of left sided chest pain reproducible with palpation. Had  2  separate of V-tach  at 3:00 a.m. and this morning,  on heparin drip . Troponin trending down. HPI:     The patient is a 45 y.o.  female with history of hypertension, seizures, gastroparesis, fibromyalgia, and asthma presented to the ED today complaining of shortness of breath and chest pain being admitted for management of hypoxic respiratory failure, rule out ACS. Per patient, she started having sudden onset shortness of breath last night. Patient stated not being able to breathe while laying flat, and feeling like she was drowning. No history of CHF. No history of recent asthma exacerbations, has not been using her inhaler for the past 2 years. This morning EMS were called due to worsening shortness of breath. In ED, patient endorses shortness of breath accompanied by sharp pleuritic chest pain. States worsening cough with hemoptysis. Denies fever, chills, N/V/D. In ER, on presentation she was in respiratory distress, tachypneic with respiratory rate in 30s, on BiPAP saturating 68% on arrival.  Her respirations improved on BiPAP however she was taken off of BiPAP due to an episode of vomiting and was started on high flow nasal cannula. Labs were significant for elevated proBNP of 603, VBG significant for pH of 7.142 and bicarb of 16.6, elevated lactic acid level of of 8.68, and Elevated troponins with initial troponin 26 and repeat troponin of 137.   Chest x-ray was significant for diffuse pneumonia versus pulmonary edema. EKG is normal sinus rhythm with no acute changes. REVIEW OF SYSTEMS:   Review of Systems   Constitutional:  Negative for fever. Respiratory:  Negative for chest tightness and shortness of breath. Cardiovascular:  Positive for chest pain. Negative for leg swelling. Neurological:  Negative for light-headedness and headaches. 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 PAST SURGICAL HISTORY:      has a past surgical history that includes Hysterectomy; Breast surgery; Tonsillectomy; Adenoidectomy; sinus surgery; Cosmetic surgery; knee surgery; and Cardiac catheterization (7-). SOCIAL HISTORY:      reports that she has never smoked. She has never used smokeless tobacco. She reports current alcohol use. She reports current drug use. Drug: Marijuana Delona Members). FAMILY HISTORY:     family history includes Cancer in her maternal grandmother, mother, and paternal grandmother; Diabetes in her brother and sister; Heart Disease in her father, maternal grandmother, and paternal grandfather; High Blood Pressure in her brother and sister. HOME MEDICATIONS:      Prior to Admission medications    Medication Sig Start Date End Date Taking? Authorizing Provider   lamoTRIgine (LAMICTAL) 100 MG tablet Take 1 tablet by mouth in the morning and 1 tablet before bedtime.  7/30/22   Lynn Porras MD   losartan (COZAAR) 50 mg tablet Take 1 tablet by mouth in the morning and at bedtime 6/10/22   Andrea Kenyon MD   lamoTRIgine (LAMICTAL) 25 MG tablet Weekly titation: Week 1: 25mg QAM Week 2: 25mg BID Week 3: 25mg QAM, 50mg QPM Week 4: 50mg BID Week 5: 50mg QAM, 75mg QPM Week 6: 75mg BID Week 7: 75mg QAM, 100mg QPM Week 8: 100mg BID 4/30/22   Edson Franco MD lidocaine (LIDODERM) 5 % Place 1 patch onto the skin daily 12 hours on, 12 hours off. 11/16/19   ANNA Yates - CNP   cyclobenzaprine (FLEXERIL) 10 MG tablet Take 1 tablet by mouth 3 times daily as needed for Muscle spasms 11/15/19   Poornima Fernandez MD   benzocaine-menthol (CEPACOL SORE THROAT) 15-3.6 MG lozenge Take 1 lozenge by mouth every 2 hours as needed for Sore Throat 9/18/19   Sandy Cerda PA-C   diclofenac sodium 1 % GEL Apply 2 g topically 2 times daily 4/20/19   Lou Duncan DO   Elastic Bandages & Supports (KNEE BRACE ADJUSTABLE HINGED) MISC 1 Device by Does not apply route as needed (comfort) 12/19/18   Liban Braun MD   dicyclomine (BENTYL) 10 MG capsule Take 1 capsule by mouth every 6 hours as needed (cramps) 5/26/18   Nathalie Kimbrough, DO   Misc. Devices (WRIST BRACE) MISC 1 Device by Does not apply route daily One brace to be worn at night and daily as needed for carpal tunnel syndrome. Brace should place the wrist in neutral. 2/20/17   Laly Ramos DO   EPINEPHrine (EPIPEN) 0.3 MG/0.3ML SOAJ injection Inject 0.3 mg into the muscle as needed Use as directed for allergic reaction    Historical Provider, MD   Elastic Bandages & Supports ([address]) MISC 1 Device by Does not apply route as needed 6/2/15   Celio Smith MD   ondansetron (ZOFRAN) 4 MG tablet Take 1 tablet by mouth every 8 hours as needed for Nausea. 8/8/14   Boston Guaman,    indomethacin (INDOCIN) 50 MG capsule Take 1 capsule by mouth 3 times daily (with meals).  7/26/14   Hernandez George MD       ALLERGIES:     Latex, Motrin [ibuprofen], Norco [hydrocodone-acetaminophen], Tramadol, Naproxen, and Tylenol [acetaminophen]      OBJECTIVE:       Vitals:    08/01/22 1415 08/01/22 1430 08/01/22 1445 08/01/22 1500   BP: 133/72 119/69 121/60 124/66   Pulse: 69 68 72 71   Resp: 18 20 20 18   Temp:       TempSrc:       SpO2:       Weight:       Height:             Intake/Output Summary (Last 24 hours) at 8/1/2022 1527  Last data filed at 8/1/2022 1500  Gross per 24 hour   Intake 494.66 ml   Output 2200 ml   Net -1705.34 ml       PHYSICAL EXAM:  Physical Exam  Cardiovascular:      Rate and Rhythm: Normal rate and regular rhythm. Pulses: Normal pulses. Heart sounds: Normal heart sounds. Pulmonary:      Effort: Pulmonary effort is normal.      Breath sounds: Normal breath sounds. Chest:      Chest wall: Tenderness (tender to palpation on left side) present. Musculoskeletal:      Right lower leg: No edema. Left lower leg: No edema. Neurological:      Mental Status: She is alert.        DIAGNOSTICS:     Laboratory Testing:    Recent Results (from the past 24 hour(s))   POC Glucose Fingerstick    Collection Time: 07/31/22  4:17 PM   Result Value Ref Range    POC Glucose 134 (H) 65 - 105 mg/dL   Lactic Acid    Collection Time: 07/31/22  5:50 PM   Result Value Ref Range    Lactic Acid, Whole Blood 1.5 0.7 - 2.1 mmol/L   APTT    Collection Time: 07/31/22  7:06 PM   Result Value Ref Range    PTT >120.0 (HH) 20.5 - 30.5 sec   POC Glucose Fingerstick    Collection Time: 07/31/22  8:02 PM   Result Value Ref Range    POC Glucose 130 (H) 65 - 105 mg/dL   Troponin    Collection Time: 08/01/22  1:29 AM   Result Value Ref Range    Troponin, High Sensitivity 69 (HH) 0 - 14 ng/L   APTT    Collection Time: 08/01/22  1:29 AM   Result Value Ref Range    PTT 86.7 (HH) 20.5 - 30.5 sec   Basic Metabolic Panel w/ Reflex to MG    Collection Time: 08/01/22  6:15 AM   Result Value Ref Range    Glucose 94 70 - 99 mg/dL    BUN 8 6 - 20 mg/dL    Creatinine 0.54 0.50 - 0.90 mg/dL    Calcium 8.6 8.6 - 10.4 mg/dL    Sodium 138 135 - 144 mmol/L    Potassium 3.5 (L) 3.7 - 5.3 mmol/L    Chloride 102 98 - 107 mmol/L    CO2 23 20 - 31 mmol/L    Anion Gap 13 9 - 17 mmol/L    GFR Non-African American >60 >60 mL/min    GFR African American >60 >60 mL/min    GFR Comment         CBC with Auto Differential    Collection Time: 08/01/22  6:15 AM   Result Value Ref Range    WBC 7.4 3.5 - 11.3 k/uL    RBC 4.34 3.95 - 5.11 m/uL    Hemoglobin 9.9 (L) 11.9 - 15.1 g/dL    Hematocrit 32.1 (L) 36.3 - 47.1 %    MCV 74.0 (L) 82.6 - 102.9 fL    MCH 22.8 (L) 25.2 - 33.5 pg    MCHC 30.8 28.4 - 34.8 g/dL    RDW 14.9 (H) 11.8 - 14.4 %    Platelets 406 455 - 714 k/uL    MPV 11.2 8.1 - 13.5 fL    NRBC Automated 0.0 0.0 per 100 WBC    RBC Morphology ANISOCYTOSIS PRESENT     Seg Neutrophils 51 36 - 65 %    Lymphocytes 41 24 - 43 %    Monocytes 6 3 - 12 %    Eosinophils % 1 1 - 4 %    Basophils 0 0 - 2 %    Immature Granulocytes 0 0 %    Segs Absolute 3.76 1.50 - 8.10 k/uL    Absolute Lymph # 3.05 1.10 - 3.70 k/uL    Absolute Mono # 0.45 0.10 - 1.20 k/uL    Absolute Eos # 0.09 0.00 - 0.44 k/uL    Basophils Absolute 0.03 0.00 - 0.20 k/uL    Absolute Immature Granulocyte 0.03 0.00 - 0.30 k/uL   Troponin    Collection Time: 08/01/22  6:15 AM   Result Value Ref Range    Troponin, High Sensitivity 56 (HH) 0 - 14 ng/L   APTT    Collection Time: 08/01/22  6:15 AM   Result Value Ref Range    PTT 71.9 (H) 20.5 - 30.5 sec   Magnesium    Collection Time: 08/01/22  6:15 AM   Result Value Ref Range    Magnesium 1.9 1.6 - 2.6 mg/dL   EKG 12 Lead    Collection Time: 08/01/22  7:33 AM   Result Value Ref Range    Ventricular Rate 72 BPM    Atrial Rate 72 BPM    P-R Interval 172 ms    QRS Duration 78 ms    Q-T Interval 472 ms    QTc Calculation (Bazett) 516 ms    P Axis 64 degrees    R Axis 62 degrees    T Axis 91 degrees   POC Glucose Fingerstick    Collection Time: 08/01/22  7:41 AM   Result Value Ref Range    POC Glucose 100 65 - 105 mg/dL   Troponin    Collection Time: 08/01/22  8:58 AM   Result Value Ref Range    Troponin, High Sensitivity 48 (H) 0 - 14 ng/L   Magnesium    Collection Time: 08/01/22  8:58 AM   Result Value Ref Range    Magnesium 1.8 1.6 - 2.6 mg/dL   Troponin    Collection Time: 08/01/22  1:20 PM   Result Value Ref Range    Troponin, High Sensitivity 39 (H) 0 - 14 ng/L   POC Glucose Fingerstick    Collection Time: 08/01/22  1:22 PM   Result Value Ref Range    POC Glucose 109 (H) 65 - 105 mg/dL         Imaging/Diagonstics:  EKG: normal sinus rhythm. XR CHEST PORTABLE    Result Date: 7/31/2022  EXAMINATION: ONE XRAY VIEW OF THE CHEST 7/31/2022 8:50 am COMPARISON: 04/27/2022 HISTORY: ORDERING SYSTEM PROVIDED HISTORY: Shortness of Breath TECHNOLOGIST PROVIDED HISTORY: Shortness of Breath FINDINGS: Interval development of diffuse airspace opacities sparing the lung periphery. Pulmonary edema versus diffuse pneumonia. No pleural effusion. No pneumothorax. No acute bony abnormality. Interval development of diffuse airspace opacities sparing small portions of the lung periphery. Diffuse pneumonia versus pulmonary edema. CT CHEST PULMONARY EMBOLISM W CONTRAST    Result Date: 7/31/2022  EXAMINATION: CTA OF THE CHEST 7/31/2022 12:17 pm TECHNIQUE: CTA of the chest was performed after the administration of intravenous contrast.  Multiplanar reformatted images are provided for review. MIP images are provided for review. Automated exposure control, iterative reconstruction, and/or weight based adjustment of the mA/kV was utilized to reduce the radiation dose to as low as reasonably achievable. COMPARISON: 10/25/2021, 09/29/2016 HISTORY: ORDERING SYSTEM PROVIDED HISTORY: Pulmonary embolus rule out TECHNOLOGIST PROVIDED HISTORY: Pulmonary embolus rule out Reason for Exam: Pulmonary embolus rule out FINDINGS: Pulmonary Arteries: Pulmonary arteries are adequately opacified for evaluation. No evidence of intraluminal filling defect to suggest pulmonary embolism. Main pulmonary artery is normal in caliber. Mediastinum: The thyroid is grossly unremarkable. There is mild mediastinal and bilateral hilar lymphadenopathy, most likely benign and reactive in etiology given the patient's lung findings.   The intrathoracic aorta is grossly unremarkable, without evidence of aneurysm. There is a mild amount of pericardial fluid, without a significant pericardial effusion evident. Lungs/pleura: Central airways are patent. There are small bilateral pleural effusions, right greater than left. There is bibasilar predominant intralobular septal thickening, suggestive of bibasilar interstitial pulmonary edema. There is diffuse ground-glass opacity throughout both lungs, with a somewhat nodular appearance, most likely reflecting a combination of pulmonary edema and superimposed pneumonia. Mild multifocal consolidative opacity within the lingula and bilateral lower lobes likely reflects additional multifocal pneumonia. There is no evidence of a pneumothorax. There is a background of mild emphysema. No definite suspicious pulmonary nodule or mass is identified. Upper Abdomen: The patient is status post cholecystectomy. Adrenal glands are unremarkable bilaterally. Remainder of the upper abdomen is grossly unremarkable. Soft Tissues/Bones: There is multilevel degenerative change throughout the thoracic spine. No osteolytic or osteoblastic lesion is seen. 1. No CT evidence of a pulmonary embolism. 2. Small bilateral pleural effusions with bibasilar predominant interstitial pulmonary edema. 3. Diffuse scattered ground-glass and consolidative opacities throughout both lungs likely reflects a combination of pulmonary edema and multifocal pneumonia. 4. Mild mediastinal and bilateral hilar lymphadenopathy is most likely benign and reactive in etiology given the patient's lung findings.        Current Facility-Administered Medications   Medication Dose Route Frequency Provider Last Rate Last Admin    potassium chloride (KLOR-CON M) extended release tablet 40 mEq  40 mEq Oral Once Gabby Brewster APRN - NP        potassium chloride (KLOR-CON M) extended release tablet 40 mEq  40 mEq Oral PRN ANNA Ramírez - NP        Or    potassium bicarb-citric acid (EFFER-K) effervescent tablet 40 mEq  40 mEq Oral PRN ANNA Sheriff NP   40 mEq at 08/01/22 1406    Or    potassium chloride 10 mEq/100 mL IVPB (Peripheral Line)  10 mEq IntraVENous PRN ANNA Ramírez NP        cefTRIAXone (ROCEPHIN) 1,000 mg in sterile water 10 mL IV syringe  1,000 mg IntraVENous Q24H Clara Waller MD   1,000 mg at 08/01/22 1331    magnesium sulfate 2000 mg in 50 mL IVPB premix  2,000 mg IntraVENous Once ANNA Sheriff NP 25 mL/hr at 08/01/22 1347 2,000 mg at 08/01/22 1347    nitroGLYCERIN 50 mg in dextrose 5% 250 mL infusion  3 mcg/min IntraVENous Continuous ANAN Ramírez NP 0.9 mL/hr at 08/01/22 1350 3 mcg/min at 08/01/22 1350    potassium chloride (MICRO-K) extended release capsule 40 mEq  40 mEq Oral Once Clara Waller MD        metoprolol tartrate (LOPRESSOR) tablet 25 mg  25 mg Oral BID ANNA Ramírez NP        heparin (porcine) injection 9,200 Units  80 Units/kg (Order-Specific) IntraVENous PRN Martin Sun MD        heparin (porcine) injection 4,600 Units  40 Units/kg (Order-Specific) IntraVENous PRN Martin Sun MD        heparin 25,000 units in dextrose 5 % 250 mL infusion (rate based)  5-30 Units/kg/hr (Order-Specific) IntraVENous Continuous Martin Sun MD 13.8 mL/hr at 08/01/22 0151 12 Units/kg/hr at 08/01/22 0151    azithromycin (ZITHROMAX) 250 mg in dextrose 5 % 250 mL IVPB  250 mg IntraVENous Q24H Martin Sun MD   Stopped at 08/01/22 1328    dicyclomine (BENTYL) capsule 10 mg  10 mg Oral Q6H PRN Martin Sun MD        [Held by provider] losartan (COZAAR) tablet 50 mg  50 mg Oral BID Martin Sun MD        sodium chloride flush 0.9 % injection 5-40 mL  5-40 mL IntraVENous 2 times per day Martin Sun MD   10 mL at 08/01/22 0954    sodium chloride flush 0.9 % injection 5-40 mL  5-40 mL IntraVENous PRN Martin Sun MD        0.9 % sodium chloride infusion   IntraVENous PRN Martin Sun MD        ondansetron (ZOFRAN-ODT) disintegrating tablet 4 mg  4 mg Oral Q8H PRN Tianna Bowman MD        Or    ondansetron (ZOFRAN) injection 4 mg  4 mg IntraVENous Q6H PRN Tianna Bowman MD        polyethylene glycol (GLYCOLAX) packet 17 g  17 g Oral Daily PRN Tainna Bowman MD        famotidine (PEPCID) 20 mg in sodium chloride (PF) 10 mL injection  20 mg IntraVENous BID Rj Denny MD   20 mg at 08/01/22 0941    furosemide (LASIX) injection 20 mg  20 mg IntraVENous BID Raul Reece MD   20 mg at 08/01/22 0940    atorvastatin (LIPITOR) tablet 40 mg  40 mg Oral Nightly Rj Denny MD   40 mg at 07/31/22 2021    aspirin chewable tablet 81 mg  81 mg Oral Daily Rj Denny MD   81 mg at 08/01/22 0939    insulin lispro (HUMALOG) injection vial 0-4 Units  0-4 Units SubCUTAneous TID WC Rj Denny MD        insulin lispro (HUMALOG) injection vial 0-4 Units  0-4 Units SubCUTAneous Nightly Rj Denny MD        glucose chewable tablet 16 g  4 tablet Oral PRN Rj Denny MD        dextrose bolus 10% 125 mL  125 mL IntraVENous PRN Rj Denny MD        Or    dextrose bolus 10% 250 mL  250 mL IntraVENous PRN Rj Denny MD        glucagon (rDNA) injection 1 mg  1 mg SubCUTAneous PRN Rj Denny MD        dextrose 10 % infusion   IntraVENous Continuous PRN Rj Denny MD        lamoTRIgine (LAMICTAL) tablet 100 mg  100 mg Oral BID Tianna Bowman MD   100 mg at 08/01/22 0615       ASSESSMENT:     Principal Problem:    Respiratory failure with hypoxia ([address]  Active Problems:    Respiratory distress    Acute pulmonary edema ([address]    Pneumonia of both lungs due to infectious organism    Chest pain    Hypertension    Asthma  Resolved Problems:    * No resolved hospital problems.  *      PLAN:        Hypoxic respiratory failure 2/2 pulmonary edema versus community-acquired pneumonia  -Satting 96% on high flow nasal cannula, wean as tolerated  -CTA 7/31/2022: Negative for PE, small bilateral pleural effusions with interstitial pulmonary edema, diffuse groundglass and consolidative opacities combination of pulmonary edema versus multifocal pneumonia  -S/p IV Lasix 20 mg in ED  -Leukocyte wnl 7.4 (trended down from 12.5)   -On IV Rocephin and azithromycin  -Urine and blood cultures drawn   - urine and blood cx shows no growth  -IV diuresis with Lasix 20 mg twice daily  - Started on cardiac diet and low salt. NPO after midnight  - Cath lab tomorrow       Anion gap metabolic acidosis 2/2 possible ischemia  -VBG 7/31/2022: pH 7.142, HCO3 16.6, lactic acid 8.68  -ED physician not concerned about lactic acid, did not give IV fluids due to pulmonary edema  -Lactic acid 1.5       Chest pain in setting of elevated troponin, rule out ACS  -Initial troponin of 26 and repeat troponin of 137, trend troponin   - Troponin trending down to 48  -Heparin drip  -Patient loaded with aspirin 324 in ED  -Start patient on aspirin 81 mg daily, Lipitor 40 mg daily, Lopressor 12.5 mg twice daily -Per cardiology recommendations  -IV diuresis with Lasix 20 mg twice daily  -Cardiology following, will appreciate recommendations   - chest pain likely musculoskeletal. Started on nitro drip.   - Continue heparin drip, beta-blocker, and statin  - Cath lab in AM  -Last echo 7/11/2015: Normal LVEF, borderline pulmonary hypertension  -Last stress test 10/25/2021: Normal, with ejection fraction of 60%     Elevated fasting blood glucose/no history or diagnosis of diabetes  -Last A1c on 4/28/2022: 5.2  -Fasting blood glucose 109  -Hypoglycemia protocol and low sliding scale     Hypertension   -Losartan on hold, per cardiology  -Lopressor 12.5 mg twice daily     seizures  -Lamictal 100 mg twice daily           DVT prophylaxis: already anticoagulated with HEPARIN DRIP  GI prophylaxis: Protonix 40 mg daily          Above plan discussed with the patient and family in room, who agreed to the above plan     Plan will be discussed with the attending, Dr. Gill Hall MD  [facility] Medicine Resident  8/1/2022 3:27 PM       Attending Physician Statement  I have discussed the care off. First name [address] pertinent history and exam findings,  with the resident. I have seen and examined the patient and the key elements of all parts of the encounter have been performed by me. I agree with the assessment, plan and orders as documented by the resident. (GC Modifier)    ACS- CP with elevated Trops trending down/ Echo pending.  Cardiology input noted  Reproducible CP- Tylenol  Pulmonary edema with Pneumonia- improving/ Blood Culture day 1 no growth  Metabolic Acidosis resolved

## 2022-08-01 NOTE — PROGRESS NOTES
Pt rested comfortably throughout the night. IV heparin infusing at 12 units/kg/hr. IV site clean dry and intact. No signs of phlebits or infiltration. Vital signs stable. Pt had a 13 beat run of Vtach approx around 3am. MD Juan Luis Del Real notified. Orders to continue to monitor received. Pt on 3L nasal cannula. Sp02 trending at 96%. Pt had mild complaints of SOB related to pulmonary edema. Lasix given as ordered to alleviate fluid overload. Pt NPO as of midnight for possible cardiac cath procedure. Will continue to monitor.

## 2022-08-01 NOTE — PLAN OF CARE
Problem: Discharge Planning  Goal: Discharge to home or other facility with appropriate resources  Outcome: Progressing     Problem: Discharge Planning  Goal: Discharge to home or other facility with appropriate resources  Outcome: Progressing     Problem: Discharge Planning  Goal: Discharge to home or other facility with appropriate resources  Outcome: Progressing

## 2022-08-01 NOTE — PROGRESS NOTES
Occupational Therapy  Facility/Department: [address]  Occupational Therapy Initial Assessment    Name: Gunner Quezada  : 1970  MRN: [id number]  Date of Service: 2022    Discharge Recommendations:  Patient would benefit from continued therapy after discharge  OT Equipment Recommendations  Equipment Needed: No       Patient Diagnosis(es): The primary encounter diagnosis was Respiratory distress. A diagnosis of Acute pulmonary edema (HCC) was also pertinent to this visit. Past Medical History:  has a past medical history of Asthma, Depression, ESBL (extended spectrum beta-lactamase) producing bacteria infection, Fibromyalgia, Gastroparesis, Headache, Hyperlipidemia, Hypertension, Neuropathy, Osteoarthritis, Seizure ([facility] [address] and Tennis elbow. Past Surgical History:  has a past surgical history that includes Hysterectomy; Breast surgery; Tonsillectomy; Adenoidectomy; sinus surgery; Cosmetic surgery; knee surgery; and Cardiac catheterization (2015). Assessment   Performance deficits / Impairments: Decreased functional mobility ; Decreased ADL status; Decreased endurance;Decreased balance  Prognosis: Good  Decision Making: Medium Complexity  REQUIRES OT FOLLOW-UP: Yes  Activity Tolerance  Activity Tolerance: Patient Tolerated treatment well        Plan   Plan  Times per Week: 3-5x  Current Treatment Recommendations: Balance training, Functional mobility training, Endurance training, Gait training, Safety education & training, Patient/Caregiver education & training, Self-Care / ADL, Home management training     Restrictions  Restrictions/Precautions  Restrictions/Precautions: Fall Risk, General Precautions, Up as Tolerated  Required Braces or Orthoses?: No    Subjective   General  Patient assessed for rehabilitation services?: Yes  Family / Caregiver Present: No  Diagnosis: acute pulomnary edema, SOB, nausea  Subjective  Subjective: pt reported 8/10 pain in chest at start of session, verbalized less pain throughout session d/t pain medication. Social/Functional History  Social/Functional History  Lives With: Alone  Type of Home: Apartment  Home Layout: One level  Home Access: Level entry  Bathroom Shower/Tub: Tub/Shower unit  Bathroom Toilet: Standard  Bathroom Accessibility: Accessible  Home Equipment: Majo Jnley, 4 wheeled  Has the patient had two or more falls in the past year or any fall with injury in the past year?: No  Receives Help From: Family  ADL Assistance: [address]: Independent  Homemaking Responsibilities: Yes  Ambulation Assistance: Independent  Transfer Assistance: Independent  Active : Yes  Mode of Transportation: Truck  Occupation: Full time employment  Type of Occupation: cleaning   Leisure & Hobbies: playing w/ [employer]  Additional Comments: pt states sons live nearby, able to assist when needed. Objective   SpO2: 98 %  O2 Device: Nasal Cannula (3L O2 at start of session, no O2 throughout session w/ no reports of SOB.)             Safety Devices  Type of Devices: Call light within reach;Gait belt; Heels elevated for pressure relief;Patient at risk for falls; Left in chair;Nurse notified  Restraints  Restraints Initially in Place: No  Bed Mobility Training  Bed Mobility Training: No  Balance  Sitting: Intact (Pt sat usnupported in chair at SUP ~8 mins.)  Standing: Impaired (Pt stood using RW at SBA ~15 mins, no reports of SOB or nausea.)  Transfer Training  Transfer Training: Yes  Sit to Stand: Stand-by assistance  Stand to Sit: Stand-by assistance  Toilet Transfer: Stand-by assistance (without use of grab bars)  Gait  Overall Level of Assistance: Stand-by assistance (Pt demo'd func mob using RW at SBA around room, no reports of SOB or nausea.)     AROM: Within functional limits (BUEs hands, elbows, and shoulders AROM WFL)  PROM: Within functional limits  Strength:  Within functional limits (BUEs hands, elbows, and shoulders 5/5 strength)  Coordination: Within functional limits  Tone: Normal  Sensation: Intact  ADL  Feeding: Independent;Setup; Increased time to complete  Grooming: Stand by assistance;Setup; Increased time to complete  UE Bathing: Stand by assistance; Increased time to complete;Setup  LE Bathing: Stand by assistance;Setup; Increased time to complete  UE Dressing: Supervision;Setup; Increased time to complete  LE Dressing: Stand by assistance;Setup; Increased time to complete  Toileting: Stand by assistance  Additional Comments: Pt began session seated in chair. Pt used figure-4 method to doff/don R sock at SUP. Pt demo'd func mob using RW at SBA to bathroom. Pt demo'd toileting without use of grab bars at SBA. Pt stood sinkside and washed hands and face using BUEs to open/close soap and turn on/off water at SUP. Pt stoof sinkside demo'd oral hygiene task, using BUEs to open/close toothpaste at SUP. pt ended session seated in chair. Vision  Vision: Impaired  Vision Exceptions: Wears glasses for reading  Hearing  Hearing: Within functional limits      Cognition  Overall Cognitive Status: WFL  Orientation  Overall Orientation Status: Within Functional Limits  Orientation Level: Oriented X4                  Education Given To: Patient  Education Provided: Role of Therapy;Plan of Care;ADL Adaptive Strategies;Transfer Training;Energy Conservation; Fall Prevention Strategies  Education Provided Comments: PT educated on figure-4 method, EC/WS techniques, breathing technqiues  Education Method: Demonstration;Verbal  Barriers to Learning: None  Education Outcome: Verbalized understanding;Demonstrated understanding  LUE AROM (degrees)  LUE AROM : WFL (L elbow and shoulder AROM WFL)  Left Hand AROM (degrees)  Left Hand AROM: WFL  RUE AROM (degrees)  RUE AROM : WFL (R elbow and shoulder AROM WFL)  Right Hand AROM (degrees)  Right Hand AROM: [facility]        AM-PAC Score        AM-PAC Inpatient Daily Activity Raw Score: 19 (08/01/22 1201)  AM-PAC Inpatient ADL T-Scale Score : 40.22 (08/01/22 1201)  ADL Inpatient CMS 0-100% Score: 42.8 (08/01/22 1201)  ADL Inpatient CMS G-Code Modifier : CK (08/01/22 1201)         Goals  Short Term Goals  Time Frame for Short term goals: Pt will by discharge  Short Term Goal 1: demo UB/LB bathing/dressing independently. Short Term Goal 2: demo good safety awareness during func mob with ADL tasks at mod I, LRD PRN. Short Term Goal 3: demo dynamic standing during func activity for 20+ mins at mod I, LRD PRN, no rest breaks. Short Term Goal 4: demo 2 EC/WS techniques during ADL routine with no cues. Short Term Goal 5: demo 2 breathing technqiues during func activity with no cues.        Therapy Time   Individual Concurrent Group Co-treatment   Time In 1033         Time Out 1108         Minutes 35         Timed Code Treatment Minutes: [address] [facility] Stephanie Doe S/OT

## 2022-08-01 NOTE — CARE COORDINATION
Plan is home, lives alone but has supportive family that live close, has walker.  Working with Gaurang Mendieta from [employer] for insurance assistance

## 2022-08-01 NOTE — PROGRESS NOTES
PATIENT REFUSES TO WEAR BIPAP     [x] Risks and benefits explained to patient   [x] Patient refuses to wear Bipap stating she is breathing fine  [x] Patient verbalizes understanding of information presented.

## 2022-08-01 NOTE — PROGRESS NOTES
[facility]  Progress Note                   Date:   8/1/2022  Patient name: Grzegorz Kwon  Date of admission:  7/31/2022  8:36 AM  MRN:   [id number]  YOB: 1970  PCP: Collette Lange, MD    Reason for Admission: Acute pulmonary edema ([facility] [address] [J81.0]  Respiratory distress [R06.03]  Respiratory failure with hypoxia ([facility] [address] [J96.91]    Subjective:       Clinical Changes /Abnormalities:Patient seen and examined. Still complaining of chest pain that is reproducible with touch  radiating to left shoulder. Sitting in chair on room air with no distress,  shortness of breath. Tele/vitals/labs reviewed .   Had  2  separate of V-tach  at 3:00 a.m. and this morning,  on heparin drip       Review of Systems    Medications:   Scheduled Meds:   potassium chloride  40 mEq Oral Once    cefTRIAXone (ROCEPHIN) IV  1,000 mg IntraVENous Q24H    magnesium sulfate  2,000 mg IntraVENous Once    potassium chloride  40 mEq Oral Once    azithromycin  250 mg IntraVENous Q24H    [Held by provider] losartan  50 mg Oral BID    sodium chloride flush  5-40 mL IntraVENous 2 times per day    famotidine (PEPCID) injection  20 mg IntraVENous BID    furosemide  20 mg IntraVENous BID    atorvastatin  40 mg Oral Nightly    metoprolol tartrate  12.5 mg Oral BID    aspirin  81 mg Oral Daily    insulin lispro  0-4 Units SubCUTAneous TID WC    insulin lispro  0-4 Units SubCUTAneous Nightly    lamoTRIgine  100 mg Oral BID     Continuous Infusions:   nitroGLYCERIN      heparin (PORCINE) Infusion 12 Units/kg/hr (08/01/22 0151)    sodium chloride      dextrose       CBC:   Recent Labs     07/31/22  0850 07/31/22  1206 08/01/22  0615   WBC 10.5 12.5* 7.4   HGB 11.7* 11.1* 9.9*    255 215     BMP:    Recent Labs     07/31/22  0850 07/31/22  0858 08/01/22  0615     --  138   K 3.7  --  3.5*     --  102   CO2 12*  --  23   BUN 9  --  8   CREATININE 0.72 0.68 0.54   GLUCOSE 282*  --  94     Hepatic:  Recent Labs edema  Community-acquired pneumonia, multifocal  Elevated fasting blood glucose likely new diagnosis of diabetes mellitus  Hypertension  Fibromyalgia  Seizures       Patient Active Problem List:     Chest pain     Abdominal pain     Gastroparesis     Hypertension     Asthma     Neuropathy     Fibromyalgia     Depression     S/P cardiac cath- Normal 7/14/15 -= Dr. Jenny Beltran     Right sided weakness     Right arm numbness     Right leg numbness     Intractable hemiplegic migraine with status migrainosus     Hemifacial spasm     Effusion of left knee joint     Leg pain, left     Seizure (HCC)     Bilateral carpal tunnel syndrome     Mastoiditis     Inadequate social support     Focal epilepsy ([facility] [address]     Otitis media with effusion, bilateral     Yeast infection     Respiratory distress     Respiratory failure with hypoxia (HCC)     Acute pulmonary edema (HCC)     Pneumonia of both lungs due to infectious organism      Plan of Treatment:   Chest pain -   troponin trending,  could be musculoskeletal as it is producible with touch -  will start patient on nitro drip  and titrate as needed for chest,   continue heparin drip  beta-blocker and statin   V-tach episodes-  will increase beta-blocker and replace potassium and  and mag  today,  case discussed with juan luis -  plan for heart catheterization in a.m. if respiratory  still stable. I have discussed risks (including but not limited to vascular injury, infection, hematoma, contrast induced kidney dysfunction, CVA and MI), benefits, alternatives in detail. All questions answered. Patient agrees to proceed.     Keep K>4, Mg >2     Electronically signed by ANNA Garcia NP on 8/1/2022 at 12:47 PM  [address]  [phone]

## 2022-08-02 ENCOUNTER — APPOINTMENT (OUTPATIENT)
Dept: CARDIAC CATH/INVASIVE PROCEDURES | Age: 52
DRG: 193 | End: 2022-08-02

## 2022-08-02 ENCOUNTER — APPOINTMENT (OUTPATIENT)
Dept: GENERAL RADIOLOGY | Age: 52
DRG: 193 | End: 2022-08-02

## 2022-08-02 LAB
ABSOLUTE EOS #: 0.13 K/UL (ref 0–0.4)
ABSOLUTE IMMATURE GRANULOCYTE: 0 K/UL (ref 0–0.3)
ABSOLUTE LYMPH #: 3.17 K/UL (ref 1–4.8)
ABSOLUTE MONO #: 0.26 K/UL (ref 0.1–0.8)
ANION GAP SERPL CALCULATED.3IONS-SCNC: 13 MMOL/L (ref 9–17)
BASOPHILS # BLD: 1 % (ref 0–2)
BASOPHILS ABSOLUTE: 0.07 K/UL (ref 0–0.2)
BUN BLDV-MCNC: 7 MG/DL (ref 6–20)
CALCIUM SERPL-MCNC: 8.8 MG/DL (ref 8.6–10.4)
CHLORIDE BLD-SCNC: 100 MMOL/L (ref 98–107)
CO2: 24 MMOL/L (ref 20–31)
CREAT SERPL-MCNC: 0.57 MG/DL (ref 0.5–0.9)
EKG ATRIAL RATE: 119 BPM
EKG ATRIAL RATE: 72 BPM
EKG P AXIS: 18 DEGREES
EKG P AXIS: 64 DEGREES
EKG P-R INTERVAL: 172 MS
EKG P-R INTERVAL: 188 MS
EKG Q-T INTERVAL: 306 MS
EKG Q-T INTERVAL: 472 MS
EKG QRS DURATION: 78 MS
EKG QRS DURATION: 84 MS
EKG QTC CALCULATION (BAZETT): 430 MS
EKG QTC CALCULATION (BAZETT): 516 MS
EKG R AXIS: 40 DEGREES
EKG R AXIS: 62 DEGREES
EKG T AXIS: 77 DEGREES
EKG T AXIS: 91 DEGREES
EKG VENTRICULAR RATE: 119 BPM
EKG VENTRICULAR RATE: 72 BPM
EOSINOPHILS RELATIVE PERCENT: 2 % (ref 1–4)
GFR AFRICAN AMERICAN: >60 ML/MIN
GFR NON-AFRICAN AMERICAN: >60 ML/MIN
GFR SERPL CREATININE-BSD FRML MDRD: NORMAL ML/MIN/{1.73_M2}
GLUCOSE BLD-MCNC: 101 MG/DL (ref 65–105)
GLUCOSE BLD-MCNC: 150 MG/DL (ref 65–105)
GLUCOSE BLD-MCNC: 87 MG/DL (ref 70–99)
GLUCOSE BLD-MCNC: 91 MG/DL (ref 65–105)
GLUCOSE BLD-MCNC: 92 MG/DL (ref 65–105)
HCT VFR BLD CALC: 34.9 % (ref 36.3–47.1)
HEMOGLOBIN: 10 G/DL (ref 11.9–15.1)
IMMATURE GRANULOCYTES: 0 %
LYMPHOCYTES # BLD: 48 % (ref 24–44)
MCH RBC QN AUTO: 22.2 PG (ref 25.2–33.5)
MCHC RBC AUTO-ENTMCNC: 28.7 G/DL (ref 28.4–34.8)
MCV RBC AUTO: 77.4 FL (ref 82.6–102.9)
MONOCYTES # BLD: 4 % (ref 1–7)
MORPHOLOGY: ABNORMAL
NRBC AUTOMATED: 0 PER 100 WBC
PARTIAL THROMBOPLASTIN TIME: 70 SEC (ref 20.5–30.5)
PARTIAL THROMBOPLASTIN TIME: 79.1 SEC (ref 20.5–30.5)
PDW BLD-RTO: 15.3 % (ref 11.8–14.4)
PLATELET # BLD: 202 K/UL (ref 138–453)
PMV BLD AUTO: 11.6 FL (ref 8.1–13.5)
POTASSIUM SERPL-SCNC: 3.7 MMOL/L (ref 3.7–5.3)
RBC # BLD: 4.51 M/UL (ref 3.95–5.11)
SEG NEUTROPHILS: 45 % (ref 36–66)
SEGMENTED NEUTROPHILS ABSOLUTE COUNT: 2.97 K/UL (ref 1.8–7.7)
SODIUM BLD-SCNC: 137 MMOL/L (ref 135–144)
WBC # BLD: 6.6 K/UL (ref 3.5–11.3)

## 2022-08-02 PROCEDURE — 6370000000 HC RX 637 (ALT 250 FOR IP): Performed by: STUDENT IN AN ORGANIZED HEALTH CARE EDUCATION/TRAINING PROGRAM

## 2022-08-02 PROCEDURE — 2709999900 HC NON-CHARGEABLE SUPPLY

## 2022-08-02 PROCEDURE — 2580000003 HC RX 258: Performed by: STUDENT IN AN ORGANIZED HEALTH CARE EDUCATION/TRAINING PROGRAM

## 2022-08-02 PROCEDURE — 6360000002 HC RX W HCPCS: Performed by: STUDENT IN AN ORGANIZED HEALTH CARE EDUCATION/TRAINING PROGRAM

## 2022-08-02 PROCEDURE — 2580000003 HC RX 258

## 2022-08-02 PROCEDURE — 6370000000 HC RX 637 (ALT 250 FOR IP)

## 2022-08-02 PROCEDURE — 82947 ASSAY GLUCOSE BLOOD QUANT: CPT

## 2022-08-02 PROCEDURE — 85730 THROMBOPLASTIN TIME PARTIAL: CPT

## 2022-08-02 PROCEDURE — 99232 SBSQ HOSP IP/OBS MODERATE 35: CPT | Performed by: FAMILY MEDICINE

## 2022-08-02 PROCEDURE — 36415 COLL VENOUS BLD VENIPUNCTURE: CPT

## 2022-08-02 PROCEDURE — B2111ZZ FLUOROSCOPY OF MULTIPLE CORONARY ARTERIES USING LOW OSMOLAR CONTRAST: ICD-10-PCS | Performed by: INTERNAL MEDICINE

## 2022-08-02 PROCEDURE — 93458 L HRT ARTERY/VENTRICLE ANGIO: CPT

## 2022-08-02 PROCEDURE — 85025 COMPLETE CBC W/AUTO DIFF WBC: CPT

## 2022-08-02 PROCEDURE — 71045 X-RAY EXAM CHEST 1 VIEW: CPT

## 2022-08-02 PROCEDURE — 2500000003 HC RX 250 WO HCPCS: Performed by: STUDENT IN AN ORGANIZED HEALTH CARE EDUCATION/TRAINING PROGRAM

## 2022-08-02 PROCEDURE — 93010 ELECTROCARDIOGRAM REPORT: CPT | Performed by: INTERNAL MEDICINE

## 2022-08-02 PROCEDURE — 4A023N7 MEASUREMENT OF CARDIAC SAMPLING AND PRESSURE, LEFT HEART, PERCUTANEOUS APPROACH: ICD-10-PCS | Performed by: INTERNAL MEDICINE

## 2022-08-02 PROCEDURE — 6360000002 HC RX W HCPCS

## 2022-08-02 PROCEDURE — B2151ZZ FLUOROSCOPY OF LEFT HEART USING LOW OSMOLAR CONTRAST: ICD-10-PCS | Performed by: INTERNAL MEDICINE

## 2022-08-02 PROCEDURE — 2060000000 HC ICU INTERMEDIATE R&B

## 2022-08-02 PROCEDURE — 6360000004 HC RX CONTRAST MEDICATION

## 2022-08-02 PROCEDURE — 6370000000 HC RX 637 (ALT 250 FOR IP): Performed by: SURGERY

## 2022-08-02 PROCEDURE — 2500000003 HC RX 250 WO HCPCS

## 2022-08-02 PROCEDURE — 80048 BASIC METABOLIC PNL TOTAL CA: CPT

## 2022-08-02 PROCEDURE — C1894 INTRO/SHEATH, NON-LASER: HCPCS

## 2022-08-02 RX ORDER — SODIUM CHLORIDE 0.9 % (FLUSH) 0.9 %
5-40 SYRINGE (ML) INJECTION EVERY 12 HOURS SCHEDULED
Status: DISCONTINUED | OUTPATIENT
Start: 2022-08-02 | End: 2022-08-03 | Stop reason: HOSPADM

## 2022-08-02 RX ORDER — SODIUM CHLORIDE 0.9 % (FLUSH) 0.9 %
5-40 SYRINGE (ML) INJECTION PRN
Status: DISCONTINUED | OUTPATIENT
Start: 2022-08-02 | End: 2022-08-03 | Stop reason: HOSPADM

## 2022-08-02 RX ORDER — ACETAMINOPHEN 325 MG/1
650 TABLET ORAL EVERY 4 HOURS PRN
Status: DISCONTINUED | OUTPATIENT
Start: 2022-08-02 | End: 2022-08-03 | Stop reason: HOSPADM

## 2022-08-02 RX ORDER — SODIUM CHLORIDE 9 MG/ML
INJECTION, SOLUTION INTRAVENOUS CONTINUOUS
Status: CANCELLED | OUTPATIENT
Start: 2022-08-02

## 2022-08-02 RX ORDER — SODIUM CHLORIDE 9 MG/ML
INJECTION, SOLUTION INTRAVENOUS PRN
Status: DISCONTINUED | OUTPATIENT
Start: 2022-08-02 | End: 2022-08-03 | Stop reason: HOSPADM

## 2022-08-02 RX ADMIN — SODIUM CHLORIDE, PRESERVATIVE FREE 10 ML: 5 INJECTION INTRAVENOUS at 08:56

## 2022-08-02 RX ADMIN — SODIUM CHLORIDE, PRESERVATIVE FREE 20 MG: 5 INJECTION INTRAVENOUS at 08:41

## 2022-08-02 RX ADMIN — FUROSEMIDE 20 MG: 10 INJECTION, SOLUTION INTRAMUSCULAR; INTRAVENOUS at 08:40

## 2022-08-02 RX ADMIN — METOPROLOL TARTRATE 25 MG: 25 TABLET ORAL at 08:40

## 2022-08-02 RX ADMIN — FUROSEMIDE 20 MG: 10 INJECTION, SOLUTION INTRAMUSCULAR; INTRAVENOUS at 18:08

## 2022-08-02 RX ADMIN — METOPROLOL TARTRATE 25 MG: 25 TABLET ORAL at 20:22

## 2022-08-02 RX ADMIN — ASPIRIN 81 MG: 81 TABLET, CHEWABLE ORAL at 08:40

## 2022-08-02 RX ADMIN — DIPHENHYDRAMINE HCL 25 MG: 25 TABLET ORAL at 22:24

## 2022-08-02 RX ADMIN — CEFTRIAXONE SODIUM 1000 MG: 10 INJECTION, POWDER, FOR SOLUTION INTRAVENOUS at 12:30

## 2022-08-02 RX ADMIN — SODIUM CHLORIDE, PRESERVATIVE FREE 10 ML: 5 INJECTION INTRAVENOUS at 20:22

## 2022-08-02 RX ADMIN — LAMOTRIGINE 100 MG: 100 TABLET ORAL at 18:08

## 2022-08-02 RX ADMIN — MORPHINE SULFATE 2 MG: 2 INJECTION, SOLUTION INTRAMUSCULAR; INTRAVENOUS at 22:24

## 2022-08-02 RX ADMIN — AZITHROMYCIN MONOHYDRATE 250 MG: 500 INJECTION, POWDER, LYOPHILIZED, FOR SOLUTION INTRAVENOUS at 12:46

## 2022-08-02 RX ADMIN — DESMOPRESSIN ACETATE 40 MG: 0.2 TABLET ORAL at 20:22

## 2022-08-02 RX ADMIN — ONDANSETRON 4 MG: 2 INJECTION INTRAMUSCULAR; INTRAVENOUS at 22:24

## 2022-08-02 RX ADMIN — LAMOTRIGINE 100 MG: 100 TABLET ORAL at 05:00

## 2022-08-02 ASSESSMENT — PAIN SCALES - GENERAL
PAINLEVEL_OUTOF10: 7
PAINLEVEL_OUTOF10: 7
PAINLEVEL_OUTOF10: 8
PAINLEVEL_OUTOF10: 0

## 2022-08-02 ASSESSMENT — ENCOUNTER SYMPTOMS
ABDOMINAL PAIN: 0
SHORTNESS OF BREATH: 0
CHEST TIGHTNESS: 0

## 2022-08-02 ASSESSMENT — PAIN DESCRIPTION - DESCRIPTORS
DESCRIPTORS: PRESSURE
DESCRIPTORS: PRESSURE

## 2022-08-02 ASSESSMENT — PAIN DESCRIPTION - LOCATION
LOCATION: CHEST
LOCATION: GENERALIZED
LOCATION: CHEST

## 2022-08-02 ASSESSMENT — PAIN DESCRIPTION - ORIENTATION
ORIENTATION: ANTERIOR
ORIENTATION: ANTERIOR

## 2022-08-02 NOTE — PROGRESS NOTES
Patient evaluated per oxygen therapy protocol. Patient on room air at 97%. No oxygen indicated at this time.      Quang Montaño, RODO  08/02/22 0829
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
## 2022-08-02 NOTE — OP NOTE
[facility]    CARDIAC CATHETERIZATION    Date:   8/2/2022  Patient name:  Mark Marte  Date of admission:  7/31/2022  8:36 AM  MRN:   [id number]  YOB: 1970    Operators:  Primary:   MARY Washington MD (Attending Physician)    Assistant/CV fellow:  Derrell Phalen, MD      Procedure performed:      [x] Left Heart Catheterization. [] Graft Angiography. [x] Left Ventriculography. [] Right Heart Catheterization. [] Coronary Angiography. [] Aortic Valve Studies. [] PCI:      [] Other:       Pre Procedure Conscious Sedation Data:  ASA Class:    [] I [] II [x] III [] IV    Mallampati Class:  [x] I [] II [] III [] IV      Indication:  - Ventricular tachycardia   - ACS     Procedure:  Access:  [] Femoral  [x] Radial  artery       [x] Right  [] Left    Procedure: After informed consent was obtained with explanation of the risks and benefits, patient was brought to the cath lab. The access area was prepped and draped in sterile fashion. 1% lidocaine was used for local block. The artery was cannulated with 6  Fr sheath with brisk arterial blood return. The side port was frequently flushed and aspirated with normal saline. Findings:   Angiographic Findings        Cardiac Arteries and Lesion Findings       LMCA: Normal 0% stenosis. LAD: Normal 0% stenosis. The D1 is normal.     LCx: Normal 0% stenosis. The OM1 and OM2 are normal.     RCA: Normal 0% stenosis. Coronary Tree        Dominance: Right       LV Analysis   LV function assessed as:Normal.   Ejection Fraction   +----------------------------------------------------------------------+---+   ! Method                                                                ! EF%! +----------------------------------------------------------------------+---+   ! LV gram                                                               !55 !   +----------------------------------------------------------------------+---+    Procedure Summary

## 2022-08-02 NOTE — PROGRESS NOTES
Recommend Virtual Visit (Phone visit, Video visit)  Due for fasting labs and blood pressure check with ancillary    Mohit Randall PA-C     Spoke to sarah at [facility] to update all patients active medications. Dr. Josué Quevedo notified.

## 2022-08-02 NOTE — PROGRESS NOTES
[facility]  Progress Note    Date:   8/2/2022  Patient name:  Fuentes Barron  Date of admission:  7/31/2022  8:36 AM  MRN:   [id number]  YOB: 1970    SUBJECTIVE/Last 24 hours update:     Patient seen and examined at the bed side , no new acute events overnight. Pt still c/o of CP overnight, was started on morphine which caused nausea and itching. Started on zofran and benadryl to manage symptoms. Nitro drip per nurse was continued. NPO since midnight, going to cath lab this AM.     HPI:   The patient is a 45 y.o.  female with history of hypertension, seizures, gastroparesis, fibromyalgia, and asthma presented to the ED today complaining of shortness of breath and chest pain being admitted for management of hypoxic respiratory failure, rule out ACS. Per patient, she started having sudden onset shortness of breath last night. Patient stated not being able to breathe while laying flat, and feeling like she was drowning. No history of CHF. No history of recent asthma exacerbations, has not been using her inhaler for the past 2 years. This morning EMS were called due to worsening shortness of breath. In ED, patient endorses shortness of breath accompanied by sharp pleuritic chest pain. States worsening cough with hemoptysis. Denies fever, chills, N/V/D. In ER, on presentation she was in respiratory distress, tachypneic with respiratory rate in 30s, on BiPAP saturating 68% on arrival.  Her respirations improved on BiPAP however she was taken off of BiPAP due to an episode of vomiting and was started on high flow nasal cannula. Labs were significant for elevated proBNP of 603, VBG significant for pH of 7.142 and bicarb of 16.6, elevated lactic acid level of of 8.68, and Elevated troponins with initial troponin 26 and repeat troponin of 137. Chest x-ray was significant for diffuse pneumonia versus pulmonary edema.   EKG is normal sinus rhythm with no acute changes. REVIEW OF SYSTEMS:   Review of Systems   Constitutional:  Negative for fatigue and fever. Respiratory:  Negative for chest tightness and shortness of breath. Cardiovascular:  Positive for chest pain. Gastrointestinal:  Negative for abdominal pain. Neurological:  Negative for headaches. 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 PAST SURGICAL HISTORY:      has a past surgical history that includes Hysterectomy; Breast surgery; Tonsillectomy; Adenoidectomy; sinus surgery; Cosmetic surgery; knee surgery; and Cardiac catheterization (7-). SOCIAL HISTORY:      reports that she has never smoked. She has never used smokeless tobacco. She reports current alcohol use. She reports current drug use. Drug: Marijuana Fatou Montaño). FAMILY HISTORY:     family history includes Cancer in her maternal grandmother, mother, and paternal grandmother; Diabetes in her brother and sister; Heart Disease in her father, maternal grandmother, and paternal grandfather; High Blood Pressure in her brother and sister. HOME MEDICATIONS:      Prior to Admission medications    Medication Sig Start Date End Date Taking? Authorizing Provider   lamoTRIgine (LAMICTAL) 100 MG tablet Take 1 tablet by mouth in the morning and 1 tablet before bedtime.  7/30/22   Ene Macias MD   losartan (COZAAR) 50 mg tablet Take 1 tablet by mouth in the morning and at bedtime 6/10/22   Edgar Hunt MD   lamoTRIgine (LAMICTAL) 25 MG tablet Weekly titation: Week 1: 25mg QAM Week 2: 25mg BID Week 3: 25mg QAM, 50mg QPM Week 4: 50mg BID Week 5: 50mg QAM, 75mg QPM Week 6: 75mg BID Week 7: 75mg QAM, 100mg QPM Week 8: 100mg BID 4/30/22   Brenton Ren MD   lidocaine (LIDODERM) 5 % Place 1 patch onto the skin daily 12 hours on, 12 hours off. 11/16/19   ANNA Yates - CNP   cyclobenzaprine (FLEXERIL) 10 MG tablet Take 1 tablet by mouth 3 times daily as needed for Muscle spasms 11/15/19   Johnson Ribeiro MD   benzocaine-menthol (CEPACOL SORE THROAT) 15-3.6 MG lozenge Take 1 lozenge by mouth every 2 hours as needed for Sore Throat 9/18/19   Kelley Jane PA-C   diclofenac sodium 1 % GEL Apply 2 g topically 2 times daily 4/20/19   Radha Oro, DO   Elastic Bandages & Supports (KNEE BRACE ADJUSTABLE HINGED) MISC 1 Device by Does not apply route as needed (comfort) 12/19/18   Nisreen Lundy MD   dicyclomine (BENTYL) 10 MG capsule Take 1 capsule by mouth every 6 hours as needed (cramps) 5/26/18   Kate Sosa, DO   Misc. Devices (WRIST BRACE) MISC 1 Device by Does not apply route daily One brace to be worn at night and daily as needed for carpal tunnel syndrome. Brace should place the wrist in neutral. 2/20/17   Tomer Beckman, DO   EPINEPHrine (EPIPEN) 0.3 MG/0.3ML SOAJ injection Inject 0.3 mg into the muscle as needed Use as directed for allergic reaction    Historical Provider, MD   Elastic Bandages & Supports ([address]) MISC 1 Device by Does not apply route as needed 6/2/15   Joceline Oliveira MD   ondansetron (ZOFRAN) 4 MG tablet Take 1 tablet by mouth every 8 hours as needed for Nausea. 8/8/14   Kaye Guaman,    indomethacin (INDOCIN) 50 MG capsule Take 1 capsule by mouth 3 times daily (with meals).  7/26/14   Yaneth Turner MD       ALLERGIES:     Latex, Motrin [ibuprofen], Norco [hydrocodone-acetaminophen], Tramadol, Naproxen, and Tylenol [acetaminophen]      OBJECTIVE:       Vitals:    08/02/22 0645 08/02/22 0700 08/02/22 0714 08/02/22 0715   BP: 135/72 137/81 (!) 130/55 (!) 130/55   Pulse: 61 58 64 61   Resp:  17 12 14   Temp:   97.9 °F (36.6 °C) 97.9 °F (36.6 °C)   TempSrc:   Temporal Temporal   SpO2: 92% 97% 96% 98%   Weight: Height:             Intake/Output Summary (Last 24 hours) at 8/2/2022 0071  Last data filed at 8/2/2022 0501  Gross per 24 hour   Intake 891.05 ml   Output 2275 ml   Net -1383.95 ml       PHYSICAL EXAM:  Physical Exam  Cardiovascular:      Rate and Rhythm: Normal rate and regular rhythm. Pulses: Normal pulses. Heart sounds: Normal heart sounds. Pulmonary:      Effort: Pulmonary effort is normal.      Breath sounds: Normal breath sounds. Chest:   Breasts:     Left: Tenderness (tense muscle 1cm in size on palpation) present. Musculoskeletal:      Right lower leg: No edema. Left lower leg: No edema. Neurological:      Mental Status: She is alert.    Psychiatric:         Mood and Affect: Mood normal.         Behavior: Behavior normal.       DIAGNOSTICS:     Laboratory Testing:    Recent Results (from the past 24 hour(s))   Troponin    Collection Time: 08/01/22  8:58 AM   Result Value Ref Range    Troponin, High Sensitivity 48 (H) 0 - 14 ng/L   Magnesium    Collection Time: 08/01/22  8:58 AM   Result Value Ref Range    Magnesium 1.8 1.6 - 2.6 mg/dL   Troponin    Collection Time: 08/01/22  1:20 PM   Result Value Ref Range    Troponin, High Sensitivity 39 (H) 0 - 14 ng/L   POC Glucose Fingerstick    Collection Time: 08/01/22  1:22 PM   Result Value Ref Range    POC Glucose 109 (H) 65 - 105 mg/dL   APTT    Collection Time: 08/01/22  3:25 PM   Result Value Ref Range    PTT 33.6 (H) 20.5 - 30.5 sec   POC Glucose Fingerstick    Collection Time: 08/01/22  3:25 PM   Result Value Ref Range    POC Glucose 101 65 - 105 mg/dL   Troponin    Collection Time: 08/01/22  7:14 PM   Result Value Ref Range    Troponin, High Sensitivity 31 (H) 0 - 14 ng/L   POC Glucose Fingerstick    Collection Time: 08/01/22  8:48 PM   Result Value Ref Range    POC Glucose 94 65 - 105 mg/dL   APTT    Collection Time: 08/01/22 10:58 PM   Result Value Ref Range    PTT 69.8 (H) 20.5 - 30.5 sec   Basic Metabolic Panel w/ Reflex to MG development of diffuse airspace opacities sparing the lung periphery. Pulmonary edema versus diffuse pneumonia. No pleural effusion. No pneumothorax. No acute bony abnormality. Interval development of diffuse airspace opacities sparing small portions of the lung periphery. Diffuse pneumonia versus pulmonary edema. CT CHEST PULMONARY EMBOLISM W CONTRAST    Result Date: 8/1/2022  EXAMINATION: CTA OF THE CHEST 7/31/2022 12:17 pm TECHNIQUE: CTA of the chest was performed after the administration of intravenous contrast.  Multiplanar reformatted images are provided for review. MIP images are provided for review. Automated exposure control, iterative reconstruction, and/or weight based adjustment of the mA/kV was utilized to reduce the radiation dose to as low as reasonably achievable. COMPARISON: 10/25/2021, 09/29/2016 HISTORY: ORDERING SYSTEM PROVIDED HISTORY: Pulmonary embolus rule out TECHNOLOGIST PROVIDED HISTORY: Pulmonary embolus rule out Reason for Exam: Pulmonary embolus rule out FINDINGS: Pulmonary Arteries: Pulmonary arteries are adequately opacified for evaluation. No evidence of intraluminal filling defect to suggest pulmonary embolism. Main pulmonary artery is normal in caliber. Mediastinum: The thyroid is grossly unremarkable. There is mild mediastinal and bilateral hilar lymphadenopathy, most likely benign and reactive in etiology given the patient's lung findings. The intrathoracic aorta is grossly unremarkable, without evidence of aneurysm. There is a mild amount of pericardial fluid, without a significant pericardial effusion evident. Lungs/pleura: The central airways are patent. There are small bilateral pleural effusions, right greater than left. There is bibasilar predominant intralobular septal thickening, suggestive of bibasilar interstitial pulmonary edema.   There is diffuse ground-glass opacity throughout both lungs, with a somewhat nodular appearance, most likely reflecting a combination of pulmonary edema and superimposed pneumonia. Mild multifocal consolidative opacity within the lingula and bilateral lower lobes likely reflects additional multifocal pneumonia. There is no evidence of a pneumothorax. There is a background of mild emphysema. No definite suspicious pulmonary nodule or mass is identified. Upper Abdomen: The patient is status post cholecystectomy. The adrenal glands are unremarkable bilaterally. The remainder of the upper abdomen is grossly unremarkable. Soft Tissues/Bones: There is multilevel degenerative change throughout the thoracic spine. No osteolytic or osteoblastic lesion is seen. 1. No CT evidence of a pulmonary embolism. 2. Small bilateral pleural effusions with bibasilar predominant interstitial pulmonary edema. 3. Diffuse scattered ground-glass and consolidative opacities throughout both lungs likely reflects a combination of pulmonary edema and multifocal pneumonia. 4. Mild mediastinal and bilateral hilar lymphadenopathy is most likely benign and reactive in etiology given the patient's lung findings. VL DUP LOWER EXTREMITY VENOUS BILATERAL    Result Date: 8/1/2022    [facility]  Vascular Lower Extremities DVT Study Procedure   Patient Name   Eliseo Cunningham Date of Study           08/01/2022                 G   Date of Birth  1970  Gender                  Female   Age            45 year(s)  Race                    Black   Room Number    0425   Corporate ID # [id number]   Patient Acct # [de-identified]   MR #           [id number]     Anamika Burns DEBORAH   Accession #    [id number]  Interpreting Physician  Gustavo Quintanilla   Referring                  Referring Physician     Zaira Gama MD  Nurse  Practitioner  Procedure Type of Study:   Veins: Lower Extremities DVT Study, Venous Scan Lower Bilateral.  Indications for Study:Calf tenderness. Patient Status: In Patient. Technical Quality:Adequate visualization. Tramadol, Tylenol, Naproxen Velocities are measured in cm/s ; Diameters are measured in cm Right Lower Extremities DVT Study Measurements Right 2D Measurements +------------------------------------+----------+---------------+----------+ ! Location                            ! Visualized! Compressibility! Thrombosis! +------------------------------------+----------+---------------+----------+ ! Common Femoral                      !Yes       ! Yes            ! None      ! +------------------------------------+----------+---------------+----------+ ! Prox Femoral                        !Yes       ! Yes            ! None      ! +------------------------------------+----------+---------------+----------+ ! Mid Femoral                         !Yes       ! Yes            ! None      ! +------------------------------------+----------+---------------+----------+ ! Dist Femoral                        !Yes       ! Yes            ! None      ! +------------------------------------+----------+---------------+----------+ ! Popliteal                           !Yes       ! Yes            ! None      ! +------------------------------------+----------+---------------+----------+ ! Sapheno Femoral Junction            ! Yes       ! Yes            ! None      ! +------------------------------------+----------+---------------+----------+ ! PTV                                 ! Yes       ! Yes            ! None      ! +------------------------------------+----------+---------------+----------+ ! Peroneal                            !Yes       ! Yes            ! None      ! +------------------------------------+----------+---------------+----------+ ! Gastroc                             ! Yes       ! Yes            ! None      ! +------------------------------------+----------+---------------+----------+ ! GSV Thigh                           ! Yes       ! Yes            ! None      ! +------------------------------------+----------+---------------+----------+ ! IVANA Knee !Yes       !Yes            ! None      ! +------------------------------------+----------+---------------+----------+ ! Popliteal                           !Yes       ! Yes            ! None      ! +------------------------------------+----------+---------------+----------+ ! Sapheno Femoral Junction            ! Yes       ! Yes            ! None      ! +------------------------------------+----------+---------------+----------+ ! PTV                                 ! Yes       ! Yes            ! None      ! +------------------------------------+----------+---------------+----------+ ! Peroneal                            !Yes       ! Yes            ! None      ! +------------------------------------+----------+---------------+----------+ ! Gastroc                             ! Yes       ! Yes            ! None      ! +------------------------------------+----------+---------------+----------+ ! GSV Thigh                           ! Yes       ! Yes            ! None      ! +------------------------------------+----------+---------------+----------+ ! GSV Knee                            ! Yes       ! Yes            ! None      ! +------------------------------------+----------+---------------+----------+ ! GSV Ankle                           ! Yes       ! Yes            ! None      ! +------------------------------------+----------+---------------+----------+ ! SSV                                 ! Yes       ! Yes            ! None      ! +------------------------------------+----------+---------------+----------+ Left Doppler Measurements +---------------------------+------+------+--------------------------------+ ! Location                   ! Signal!Reflux! Reflux (msec)                   ! +---------------------------+------+------+--------------------------------+ ! Common Femoral             !Phasic!      !                                ! +---------------------------+------+------+--------------------------------+ ! Prox Femoral !Phasic!      !                                ! +---------------------------+------+------+--------------------------------+ ! Popliteal                  !Phasic!      !                                ! +---------------------------+------+------+--------------------------------+      Current Facility-Administered Medications   Medication Dose Route Frequency Provider Last Rate Last Admin    potassium chloride (KLOR-CON M) extended release tablet 40 mEq  40 mEq Oral Once ANNA Ramírez NP        potassium chloride (KLOR-CON M) extended release tablet 40 mEq  40 mEq Oral PRN ANNA Ramírez NP        Or    potassium bicarb-citric acid (EFFER-K) effervescent tablet 40 mEq  40 mEq Oral PRN ANNA Zambrano NP   40 mEq at 08/01/22 1406    Or    potassium chloride 10 mEq/100 mL IVPB (Peripheral Line)  10 mEq IntraVENous PRN ANNA Ramírez NP        cefTRIAXone (ROCEPHIN) 1,000 mg in sterile water 10 mL IV syringe  1,000 mg IntraVENous Q24H Cheyenne Keith MD   1,000 mg at 08/01/22 1331    nitroGLYCERIN 50 mg in dextrose 5% 250 mL infusion  3 mcg/min IntraVENous Continuous ANNA Ramírez NP 0.9 mL/hr at 08/01/22 1749 3 mcg/min at 08/01/22 1749    potassium chloride (MICRO-K) extended release capsule 40 mEq  40 mEq Oral Once Cheyenne Keith MD        metoprolol tartrate (LOPRESSOR) tablet 25 mg  25 mg Oral BID ANNA Zambrano NP   25 mg at 08/01/22 2049    morphine (PF) injection 2 mg  2 mg IntraVENous Q4H PRN João Granados MD   2 mg at 08/01/22 2112    diphenhydrAMINE (BENADRYL) tablet 25 mg  25 mg Oral Q6H PRN João Granados MD   25 mg at 08/01/22 2113    heparin (porcine) injection 9,200 Units  80 Units/kg (Order-Specific) IntraVENous PRN Abby Fair MD        heparin (porcine) injection 4,600 Units  40 Units/kg (Order-Specific) IntraVENous PRN Abby Fair MD   4,600 Units at 08/01/22 1700    heparin 25,000 units in dextrose 5 % 250 mL infusion (rate based) 5-30 Units/kg/hr (Order-Specific) IntraVENous Continuous Faye Archibald MD 16.1 mL/hr at 08/01/22 2233 14 Units/kg/hr at 08/01/22 2233    azithromycin (ZITHROMAX) 250 mg in dextrose 5 % 250 mL IVPB  250 mg IntraVENous Q24H Faye Archibald MD   Stopped at 08/01/22 1309    dicyclomine (BENTYL) capsule 10 mg  10 mg Oral Q6H PRN Faye Archibald MD        [Held by provider] losartan (COZAAR) tablet 50 mg  50 mg Oral BID Faye Archibald MD        sodium chloride flush 0.9 % injection 5-40 mL  5-40 mL IntraVENous 2 times per day Faye Archibald MD   10 mL at 08/01/22 2056    sodium chloride flush 0.9 % injection 5-40 mL  5-40 mL IntraVENous PRN Faye Archibald MD        0.9 % sodium chloride infusion   IntraVENous PRN Faye Archibald MD        ondansetron (ZOFRAN-ODT) disintegrating tablet 4 mg  4 mg Oral Q8H PRN Faye Archibald MD        Or    ondansetron (ZOFRAN) injection 4 mg  4 mg IntraVENous Q6H PRN Faye Archibald MD   4 mg at 08/01/22 2229    polyethylene glycol (GLYCOLAX) packet 17 g  17 g Oral Daily PRN Faye Archibald MD        famotidine (PEPCID) 20 mg in sodium chloride (PF) 10 mL injection  20 mg IntraVENous BID Gio De La Cruz MD   20 mg at 08/01/22 2049    furosemide (LASIX) injection 20 mg  20 mg IntraVENous BID Efren Strauss MD   20 mg at 08/01/22 1708    atorvastatin (LIPITOR) tablet 40 mg  40 mg Oral Nightly Gio De La Cruz MD   40 mg at 08/01/22 2049    aspirin chewable tablet 81 mg  81 mg Oral Daily Gio De La Cruz MD   81 mg at 08/01/22 0939    insulin lispro (HUMALOG) injection vial 0-4 Units  0-4 Units SubCUTAneous TID WC Gio De La Cruz MD        insulin lispro (HUMALOG) injection vial 0-4 Units  0-4 Units SubCUTAneous Nightly Gio De La Cruz MD        glucose chewable tablet 16 g  4 tablet Oral PRN Gio De La Cruz MD        dextrose bolus 10% 125 mL  125 mL IntraVENous PRN Gio De La Cruz MD        Or    dextrose bolus 10% 250 mL  250 mL IntraVENous PRNALLELY De La Cruz MD        glucagon (rDNA) injection 1 mg  1 mg SubCUTAneous PRN Dominick Fernando MD        dextrose 10 % infusion   IntraVENous Continuous PRN Dominick Fernando MD        lamoTRIgine (LAMICTAL) tablet 100 mg  100 mg Oral BID Paco Hebert MD   100 mg at 08/02/22 0500       ASSESSMENT:     Principal Problem:    Respiratory failure with hypoxia ([address]  Active Problems:    Respiratory distress    Acute pulmonary edema (HCC)    Pneumonia of both lungs due to infectious organism    Chest pain    Hypertension    Asthma  Resolved Problems:    * No resolved hospital problems. *      PLAN:   Hypoxic respiratory failure 2/2 pulmonary edema versus community-acquired pneumonia  -Satting 96% on high flow nasal cannula, wean as tolerated  -CTA 7/31/2022: Negative for PE, small bilateral pleural effusions with interstitial pulmonary edema, diffuse groundglass and consolidative opacities combination of pulmonary edema versus multifocal pneumonia  -S/p IV Lasix 20 mg in ED  -Leukocyte wnl 7.4 (trended down from 12.5)   -On IV Rocephin and azithromycin  -Urine and blood cultures drawn              - urine and blood cx shows no growth  -IV diuresis with Lasix 20 mg twice daily  - Started on cardiac diet and low salt.  NPO after midnight  - Cath lab this AM- normal findings        Anion gap metabolic acidosis 2/2 possible ischemia  -VBG 7/31/2022: pH 7.142, HCO3 16.6, lactic acid 8.68  -ED physician not concerned about lactic acid, did not give IV fluids due to pulmonary edema  -Lactic acid 1.5        Chest pain in setting of elevated troponin, rule out ACS  -Initial troponin of 26 and repeat troponin of 137, trend troponin              - Troponin trending down to 31  -Heparin drip  -Patient loaded with aspirin 324 in ED  -Start patient on aspirin 81 mg daily, Lipitor 40 mg daily, Lopressor 12.5 mg twice daily Per cardiology recommendations  -IV diuresis with Lasix 20 mg twice daily  -Last echo 7/11/2015: Normal LVEF, borderline pulmonary hypertension  -Last stress test 10/25/2021: Normal, with ejection fraction of 60%  -Cardiology following              - chest pain likely musculoskeletal. Started on nitro drip.   - Continue heparin drip, beta-blocker, and statin  - Cath lab completed this AM- normal findings   - Started on morphine for chest pain, then zofran and benadryl due to nausea and itching       Elevated fasting blood glucose/no history or diagnosis of diabetes  -Last A1c on 4/28/2022: 5.2  -Fasting blood glucose 109  -Hypoglycemia protocol and low sliding scale     Hypertension   -Losartan on hold, per cardiology  -Lopressor 12.5 mg twice daily     seizures  -Lamictal 100 mg twice daily           DVT prophylaxis: already anticoagulated with HEPARIN DRIP  GI prophylaxis: Protonix 40 mg daily               Above plan discussed with the patient and family in room, who agreed to the above plan     Plan will be discussed with the attending, Dr. Ailyn Griffith MD  Family Medicine Resident  8/2/2022 8:22 AM

## 2022-08-02 NOTE — PROGRESS NOTES
Physical Therapy        Physical Therapy Cancel Note      DATE: 2022    NAME: Gunner Quezada  MRN: [id number]   : 1970      Patient not seen this date for Physical Therapy due to:    Having cardiac cath this AM. Will continue to pursue.       Electronically signed by John Nunez PT on 2022 at 12:01 PM

## 2022-08-02 NOTE — PROGRESS NOTES
[facility]  Progress Note                   Date:   8/2/2022  Patient name: Bowen Wooten  Date of admission:  7/31/2022  8:36 AM  MRN:   [id number]  YOB: 1970  PCP: Ciara Menjivar MD    Reason for Admission: Acute pulmonary edema ([facility] [address] [J81.0]  Respiratory distress [R06.03]  Respiratory failure with hypoxia ([address] [J96.91]    Subjective:       Clinical Changes /Abnormalities:   Patient seen and examined. Still complains of left chest /breast tenderness exacerbated with movement, history of breast reduction . Denies shortness o breath and on RA with no distress of breath . Tele/vitals/labs reviewed . Sinus rhythm on monitor Discussed case with RN.     Review of Systems    Medications:   Scheduled Meds:   potassium chloride  40 mEq Oral Once    cefTRIAXone (ROCEPHIN) IV  1,000 mg IntraVENous Q24H    potassium chloride  40 mEq Oral Once    metoprolol tartrate  25 mg Oral BID    azithromycin  250 mg IntraVENous Q24H    [Held by provider] losartan  50 mg Oral BID    sodium chloride flush  5-40 mL IntraVENous 2 times per day    famotidine (PEPCID) injection  20 mg IntraVENous BID    furosemide  20 mg IntraVENous BID    atorvastatin  40 mg Oral Nightly    aspirin  81 mg Oral Daily    insulin lispro  0-4 Units SubCUTAneous TID WC    insulin lispro  0-4 Units SubCUTAneous Nightly    lamoTRIgine  100 mg Oral BID     Continuous Infusions:   nitroGLYCERIN Stopped (08/02/22 0943)    heparin (PORCINE) Infusion 14 Units/kg/hr (08/01/22 2233)    sodium chloride      dextrose       CBC:   Recent Labs     07/31/22  1206 08/01/22  0615 08/02/22  0454   WBC 12.5* 7.4 6.6   HGB 11.1* 9.9* 10.0*    215 202       BMP:    Recent Labs     07/31/22  0850 07/31/22  0858 08/01/22  0615 08/02/22  0454     --  138 137   K 3.7  --  3.5* 3.7     --  102 100   CO2 12*  --  23 24   BUN 9  --  8 7   CREATININE 0.72 0.68 0.54 0.57   GLUCOSE 282*  --  94 87       Hepatic:  Recent Labs 07/31/22  0850   *   ALT 74*   BILITOT 0.35   ALKPHOS 97       Troponin:   Recent Labs     08/01/22  0858 08/01/22  1320 08/01/22  1914   TROPHS 48* 39* 31*       BNP: No results for input(s): BNP in the last 72 hours. Lipids: No results for input(s): CHOL, HDL in the last 72 hours. Invalid input(s): LDLCALCU  INR: No results for input(s): INR in the last 72 hours. EKG:    Date: 07/31/22  Reading: No acute ischemia  Normal sinus rhythm with no acute changes     LAST ECHO:  Date: 07/11/2015  Findings Summary:  Summary  Inferior hypokinesis  Normal LVEF  Mild MR and mild LA enlargement  Borderline pulmonary hypertension     LAST Stress Test:   Date of last ST: 10/25/2021  Major Findings:  Perfusion:  Normal       Function:  No significant wall motion abnormalities. Left ventricular   ejection fraction of 60%. Risk stratification: LOW      LAST Cardiac Angiography:.  Date: 07/11/2015  Findings:  Left main: NL  LAD: NL  LCX: NL  RCA: NL  The LV gram was performed in the THORPE 30 position. LVEF: 55%. LV Wall Motion: Normal        Conclusions:   Normal / Non obstructive CAD  Overall preserved LV function         Objective:   Vitals: BP (!) 130/55   Pulse 61   Temp 97.9 °F (36.6 °C) (Temporal)   Resp 14   Ht 5' 8\" (1.727 m)   Wt 266 lb 12.8 oz (121 kg)   SpO2 98%   BMI 40.57 kg/m²   General appearance: alert and cooperative with exam  HEENT: Head: Normocephalic, no lesions, without obvious abnormality. Neck:no JVD, trachea midline, no adenopathy  Lungs: Clear to auscultation, diminished to bases   Heart: Regular rate and rhythm, s1/s2 auscultated, no murmurs  Abdomen: soft, non-tender, bowel sounds active  Extremities: no edema  Neurologic: not done        Assessment / Acute Cardiac Problems:   Elevated and uptrending troponins type II versus type I type I, EKG NSR with no acute changes.   Acute hypoxic respiratory failure  Acute pulmonary edema  Hemoptysis likely due to acute pulmonary

## 2022-08-02 NOTE — PROGRESS NOTES
Patient seen and examined at the bedside this evening. She continues to complain of left-sided chest pain reproducible with palpation. We will wean off nitro drip and start morphine.

## 2022-08-03 VITALS
BODY MASS INDEX: 40.44 KG/M2 | WEIGHT: 266.8 LBS | OXYGEN SATURATION: 100 % | HEIGHT: 68 IN | DIASTOLIC BLOOD PRESSURE: 66 MMHG | HEART RATE: 61 BPM | TEMPERATURE: 97 F | SYSTOLIC BLOOD PRESSURE: 121 MMHG | RESPIRATION RATE: 16 BRPM

## 2022-08-03 LAB
ABSOLUTE EOS #: 0.34 K/UL (ref 0–0.44)
ABSOLUTE IMMATURE GRANULOCYTE: <0.03 K/UL (ref 0–0.3)
ABSOLUTE LYMPH #: 2.44 K/UL (ref 1.1–3.7)
ABSOLUTE MONO #: 0.54 K/UL (ref 0.1–1.2)
ANION GAP SERPL CALCULATED.3IONS-SCNC: 11 MMOL/L (ref 9–17)
BASOPHILS # BLD: 0 % (ref 0–2)
BASOPHILS ABSOLUTE: <0.03 K/UL (ref 0–0.2)
BUN BLDV-MCNC: 8 MG/DL (ref 6–20)
CALCIUM SERPL-MCNC: 8.9 MG/DL (ref 8.6–10.4)
CHLORIDE BLD-SCNC: 98 MMOL/L (ref 98–107)
CO2: 22 MMOL/L (ref 20–31)
CREAT SERPL-MCNC: 0.61 MG/DL (ref 0.5–0.9)
EOSINOPHILS RELATIVE PERCENT: 5 % (ref 1–4)
GFR AFRICAN AMERICAN: >60 ML/MIN
GFR NON-AFRICAN AMERICAN: >60 ML/MIN
GFR SERPL CREATININE-BSD FRML MDRD: ABNORMAL ML/MIN/{1.73_M2}
GLUCOSE BLD-MCNC: 78 MG/DL (ref 70–99)
GLUCOSE BLD-MCNC: 92 MG/DL (ref 65–105)
HCT VFR BLD CALC: 39.4 % (ref 36.3–47.1)
HEMOGLOBIN: 11.6 G/DL (ref 11.9–15.1)
IMMATURE GRANULOCYTES: 0 %
LV EF: 55 %
LVEF MODALITY: NORMAL
LYMPHOCYTES # BLD: 36 % (ref 24–43)
MCH RBC QN AUTO: 22.7 PG (ref 25.2–33.5)
MCHC RBC AUTO-ENTMCNC: 29.4 G/DL (ref 28.4–34.8)
MCV RBC AUTO: 77 FL (ref 82.6–102.9)
MONOCYTES # BLD: 8 % (ref 3–12)
NRBC AUTOMATED: 0 PER 100 WBC
PDW BLD-RTO: 15.2 % (ref 11.8–14.4)
PLATELET # BLD: 238 K/UL (ref 138–453)
PMV BLD AUTO: 12 FL (ref 8.1–13.5)
POTASSIUM SERPL-SCNC: 3.8 MMOL/L (ref 3.7–5.3)
RBC # BLD: 5.12 M/UL (ref 3.95–5.11)
RBC # BLD: ABNORMAL 10*6/UL
SEG NEUTROPHILS: 51 % (ref 36–65)
SEGMENTED NEUTROPHILS ABSOLUTE COUNT: 3.49 K/UL (ref 1.5–8.1)
SODIUM BLD-SCNC: 131 MMOL/L (ref 135–144)
WBC # BLD: 6.9 K/UL (ref 3.5–11.3)

## 2022-08-03 PROCEDURE — 6370000000 HC RX 637 (ALT 250 FOR IP): Performed by: NURSE PRACTITIONER

## 2022-08-03 PROCEDURE — 36415 COLL VENOUS BLD VENIPUNCTURE: CPT

## 2022-08-03 PROCEDURE — 6370000000 HC RX 637 (ALT 250 FOR IP): Performed by: STUDENT IN AN ORGANIZED HEALTH CARE EDUCATION/TRAINING PROGRAM

## 2022-08-03 PROCEDURE — 99238 HOSP IP/OBS DSCHRG MGMT 30/<: CPT | Performed by: FAMILY MEDICINE

## 2022-08-03 PROCEDURE — 80048 BASIC METABOLIC PNL TOTAL CA: CPT

## 2022-08-03 PROCEDURE — 93306 TTE W/DOPPLER COMPLETE: CPT

## 2022-08-03 PROCEDURE — 85025 COMPLETE CBC W/AUTO DIFF WBC: CPT

## 2022-08-03 PROCEDURE — 82947 ASSAY GLUCOSE BLOOD QUANT: CPT

## 2022-08-03 RX ORDER — FUROSEMIDE 20 MG/1
20 TABLET ORAL DAILY
Status: DISCONTINUED | OUTPATIENT
Start: 2022-08-03 | End: 2022-08-03 | Stop reason: HOSPADM

## 2022-08-03 RX ORDER — LIDOCAINE 50 MG/G
1 PATCH TOPICAL DAILY
Qty: 5 PATCH | Refills: 0 | Status: SHIPPED | OUTPATIENT
Start: 2022-08-03

## 2022-08-03 RX ORDER — ATORVASTATIN CALCIUM 40 MG/1
40 TABLET, FILM COATED ORAL NIGHTLY
Qty: 30 TABLET | Refills: 3 | Status: SHIPPED | OUTPATIENT
Start: 2022-08-03

## 2022-08-03 RX ORDER — FUROSEMIDE 20 MG/1
20 TABLET ORAL DAILY
Qty: 60 TABLET | Refills: 3 | Status: SHIPPED | OUTPATIENT
Start: 2022-08-03

## 2022-08-03 RX ADMIN — METOPROLOL TARTRATE 25 MG: 25 TABLET ORAL at 09:29

## 2022-08-03 RX ADMIN — ASPIRIN 81 MG: 81 TABLET, CHEWABLE ORAL at 09:29

## 2022-08-03 RX ADMIN — FUROSEMIDE 20 MG: 20 TABLET ORAL at 11:34

## 2022-08-03 RX ADMIN — LAMOTRIGINE 100 MG: 100 TABLET ORAL at 06:09

## 2022-08-03 ASSESSMENT — PAIN SCALES - GENERAL: PAINLEVEL_OUTOF10: 0

## 2022-08-03 NOTE — PLAN OF CARE
Problem: Discharge Planning  Goal: Discharge to home or other facility with appropriate resources  Outcome: Adequate for Discharge     Problem: ABCDS Injury Assessment  Goal: Absence of physical injury  Outcome: Adequate for Discharge     Problem: Safety - Adult  Goal: Free from fall injury  Outcome: Adequate for Discharge     Problem: Pain  Goal: Verbalizes/displays adequate comfort level or baseline comfort level  Outcome: Adequate for Discharge

## 2022-08-03 NOTE — PROGRESS NOTES
CLINICAL PHARMACY NOTE: MEDS TO BEDS    Total # of Prescriptions Filled: 1   The following medications were delivered to the patient:  Furosemide 20mg    Additional Documentation: delivered to patient in room 425 8/3 at 11:15am. Co-pay $14.98 [employer].

## 2022-08-03 NOTE — PROGRESS NOTES
[facility]  Progress Note                   Date:   8/3/2022  Patient name: Sailaja Prather  Date of admission:  7/31/2022  8:36 AM  MRN:   [id number]  YOB: 1970  PCP: Sherrill Sanchez MD    Reason for Admission: Acute pulmonary edema ([facility] [address] [J81.0]  Respiratory distress [R06.03]  Respiratory failure with hypoxia ([facility] [address] [J96.91]    Subjective:       Clinical Changes /Abnormalities:   No acute CV issues/concerns overnight. Labs, vitals, & tele reviewed. Remains SR. Cath reviewed. Review of Systems    Medications:   Scheduled Meds:   sodium chloride flush  5-40 mL IntraVENous 2 times per day    potassium chloride  40 mEq Oral Once    cefTRIAXone (ROCEPHIN) IV  1,000 mg IntraVENous Q24H    potassium chloride  40 mEq Oral Once    metoprolol tartrate  25 mg Oral BID    azithromycin  250 mg IntraVENous Q24H    [Held by provider] losartan  50 mg Oral BID    furosemide  20 mg IntraVENous BID    atorvastatin  40 mg Oral Nightly    aspirin  81 mg Oral Daily    insulin lispro  0-4 Units SubCUTAneous TID WC    insulin lispro  0-4 Units SubCUTAneous Nightly    lamoTRIgine  100 mg Oral BID     Continuous Infusions:   sodium chloride      dextrose       CBC:   Recent Labs     08/01/22  0615 08/02/22  0454 08/03/22  0642   WBC 7.4 6.6 6.9   HGB 9.9* 10.0* 11.6*    202 238       BMP:    Recent Labs     08/01/22  0615 08/02/22  0454 08/03/22  0642    137 131*   K 3.5* 3.7 3.8    100 98   CO2 23 24 22   BUN 8 7 8   CREATININE 0.54 0.57 0.61   GLUCOSE 94 87 78       Hepatic:  No results for input(s): AST, ALT, ALB, BILITOT, ALKPHOS in the last 72 hours. Troponin:   Recent Labs     08/01/22  0858 08/01/22  1320 08/01/22  1914   TROPHS 48* 39* 31*       BNP: No results for input(s): BNP in the last 72 hours. Lipids: No results for input(s): CHOL, HDL in the last 72 hours. Invalid input(s): LDLCALCU  INR: No results for input(s): INR in the last 72 hours.     EKG:    Date: 07/31/22  Reading: No acute ischemia  Normal sinus rhythm with no acute changes     LAST ECHO:  Date: 07/11/2015  Findings Summary:  Summary  Inferior hypokinesis  Normal LVEF  Mild MR and mild LA enlargement  Borderline pulmonary hypertension     LAST Stress Test:   Date of last ST: 10/25/2021  Major Findings:  Perfusion:  Normal       Function:  No significant wall motion abnormalities. Left ventricular   ejection fraction of 60%. Risk stratification: LOW      LAST Cardiac Angiography:.  Date: 07/11/2015  Findings:  Left main: NL  LAD: NL  LCX: NL  RCA: NL  The LV gram was performed in the THORPE 30 position. LVEF: 55%. LV Wall Motion: Normal        Conclusions:   Normal / Non obstructive CAD  Overall preserved LV function     Cardiac Cath 8/2/22  Findings:   Angiographic Findings        Cardiac Arteries and Lesion Findings       LMCA: Normal 0% stenosis. LAD: Normal 0% stenosis. The D1 is normal.     LCx: Normal 0% stenosis. The OM1 and OM2 are normal.     RCA: Normal 0% stenosis. Coronary Tree        Dominance: Right       LV Analysis   LV function assessed as:Normal.   Ejection Fraction   +----------------------------------------------------------------------+---+   ! Method                                                                ! EF%! +----------------------------------------------------------------------+---+   ! LV gram                                                               !55 !   +----------------------------------------------------------------------+---+   Procedure Summary        Normal coronary arteries observed. Normal LV systolic function. Recommendations        Medical therapy as needed. Risk factor modification. Routine Post Diagnostic Cath orders.      Objective:   Vitals: /63   Pulse 65   Temp 98 °F (36.7 °C) (Oral)   Resp 18   Ht 5' 8\" (1.727 m)   Wt 266 lb 12.8 oz (121 kg)   SpO2 98%   BMI 40.57 kg/m²   General appearance: alert and cooperative with exam  HEENT: Head: Normocephalic, no lesions, without obvious abnormality. Neck:no JVD, trachea midline, no adenopathy  Lungs: Clear to auscultation, diminished to bases   Heart: Regular rate and rhythm, s1/s2 auscultated, no murmurs  Abdomen: soft, non-tender, bowel sounds active  Extremities: no edema  Neurologic: not done        Assessment / Acute Cardiac Problems:   Type II MI, normal coronary arteries on cath. Acute hypoxic respiratory failure  Acute pulmonary edema  Hemoptysis likely due to acute pulmonary edema  Community-acquired pneumonia, multifocal  Elevated fasting blood glucose likely new diagnosis of diabetes mellitus  Hypertension  Fibromyalgia  Seizures       Patient Active Problem List:     Chest pain     Abdominal pain     Gastroparesis     Hypertension     Asthma     Neuropathy     Fibromyalgia     Depression     S/P cardiac cath- Normal 7/14/15 -= Dr. Annetta Guillaume     Right sided weakness     Right arm numbness     Right leg numbness     Intractable hemiplegic migraine with status migrainosus     Hemifacial spasm     Effusion of left knee joint     Leg pain, left     Seizure (HCC)     Bilateral carpal tunnel syndrome     Mastoiditis     Inadequate social support     Focal epilepsy ([facility] [address]     Otitis media with effusion, bilateral     Yeast infection     Respiratory distress     Respiratory failure with hypoxia (HCC)     Acute pulmonary edema (HCC)     Pneumonia of both lungs due to infectious organism      Plan of Treatment:   Cath as above. Continue medical management and risk factor modifications. Continue PO ASA, statin, & BB. Oral Lasix on discharge and will reassess need to continue on OP basis. Keep K>4, Mg >2   OK for discharge from CV standpoint.  F/U in clinic in 2-3 weeks    Electronically signed by ANNA Whyte CNP on 8/3/2022 at 9:26 [address] [facility].  [phone]

## 2022-08-03 NOTE — DISCHARGE SUMMARY
Department of [address] [place] [address]    Discharge Summary      NAME:  Mark Marte  :  1970  MRN:  [id number]    Admit date:  2022  Discharge date:  8/3/2022    Admitting Physician:  Dre Nguyen MD    Primary Diagnosis on Admission:   Present on Admission:   Respiratory distress   Respiratory failure with hypoxia (HCC)   Chest pain   Hypertension   Asthma   Acute pulmonary edema ([address]   Pneumonia of both lungs due to infectious organism      Secondary Diagnoses:  does not have any pertinent problems on file. Admission Condition:  poor     Discharged Condition: fair    Hospital Course:   Shana Huffman is a 45 y.o.  female with history of hypertension, seizures, gastroparesis, fibromyalgia, and asthma presented to the ED today complaining of shortness of breath and chest pain being admitted for management of hypoxic respiratory failure, rule out ACS. Per patient, she started having sudden onset shortness of breath last night. Patient stated not being able to breathe while laying flat, and feeling like she was drowning. No history of CHF. No history of recent asthma exacerbations, has not been using her inhaler for the past 2 years. This morning EMS were called due to worsening shortness of breath. In ED, patient endorses shortness of breath accompanied by sharp pleuritic chest pain. States worsening cough with hemoptysis. Denies fever, chills, N/V/D. In ER, on presentation she was in respiratory distress, tachypneic with respiratory rate in 30s, on BiPAP saturating 68% on arrival.  Her respirations improved on BiPAP however she was taken off of BiPAP due to an episode of vomiting and was started on high flow nasal cannula. Labs were significant for elevated proBNP of 603, VBG significant for pH of 7.142 and bicarb of 16.6, elevated lactic acid level of of 8.68, and Elevated troponins with initial troponin 26 and repeat troponin of 137.   Chest x-ray was significant for diffuse pneumonia versus pulmonary edema. EKG is normal sinus rhythm with no acute changes. Patient was started on heparin drip, hypertrophy or chest pain. Cardiology is consulted patient underwent echocardiogram shows no structural abnormalities and normal ejection fraction. Cardiac cath was performed showed 0% stenosis in all major arteries. Chest pain improved as patient was diuresed and chest x-ray showed improvement of pulmonary vascular congestion. Patient's chest pain may also be caused by surgical scar tissue from breast reduction surgery. Patient was discharged with cardiology follow-up. Continued aspirin, statin and beta-blocker. Recommend to follow-up with cardiology outpatient to 3 weeks. Today on day of discharge pt feels better with no further complaints. Vitals and Labs are at pts baseline. All consultants involved during this admission are agreeable to d/c. Consults:  cardiology    Significant Diagnostic/theraputic interventions:   Heparin drip  Nitro drip  Echocardiogram  Cardiac cath      Disposition:   home    Instructions to Patient:   Recommend patient take medications as directed  Educated patient on low-salt diet, monitor weight, and limit alcohol use. Follow-up with cardiology outpatient in 2 weeks     Follow up with Sloan Mloina MD in  7 days    Discharge Medications:       Medication List        START taking these medications      atorvastatin 40 MG tablet  Commonly known as: LIPITOR  Take 1 tablet by mouth nightly     furosemide 20 MG tablet  Commonly known as: LASIX  Take 1 tablet by mouth in the morning.             CONTINUE taking these medications      benzocaine-menthol 15-3.6 MG lozenge  Commonly known as: Cepacol Sore Throat  Take 1 lozenge by mouth every 2 hours as needed for Sore Throat     dicyclomine 10 MG capsule  Commonly known as: Bentyl  Take 1 capsule by mouth every 6 hours as needed (cramps)     EPINEPHrine 0.3 MG/0.3ML Soaj injection  Commonly known as: EPIPEN     lamoTRIgine 100 MG tablet  Commonly known as: LAMICTAL  Take 1 tablet by mouth in the morning and 1 tablet before bedtime. lidocaine 5 %  Commonly known as: Lidoderm  Place 1 patch onto the skin in the morning. 12 hours on, 12 hours off. .     metoprolol tartrate 25 MG tablet  Commonly known as: LOPRESSOR  Take 1 tablet by mouth in the morning and 1 tablet before bedtime. * Tennis Elbow Support Misc  1 Device by Does not apply route as needed     * Knee Brace Adjustable Hinged Misc  1 Device by Does not apply route as needed (comfort)     Wrist Brace Misc  1 Device by Does not apply route daily One brace to be worn at night and daily as needed for carpal tunnel syndrome. Brace should place the wrist in neutral.           * This list has 2 medication(s) that are the same as other medications prescribed for you. Read the directions carefully, and ask your doctor or other care provider to review them with you. STOP taking these medications      indomethacin 50 MG capsule  Commonly known as: INDOCIN     losartan 50 MG tablet  Commonly known as: COZAAR               Where to Get Your Medications        These medications were sent to [address] [place], [place] [address] [place] [address] [address]      Phone: [phone]   atorvastatin 40 MG tablet  lidocaine 5 %  metoprolol tartrate 25 MG tablet       These medications were sent to [facility] [address] [facility], [address], [address] [address]      Phone: [phone]   furosemide 20 MG tablet         Send Copies to:  Miguel Young MD,       Note that over 30 minutes was spent in preparing discharge papers, discussing discharge with patient and family, medication review, etc.      Gabriela Ren MD  Family Medicine Resident  Family Medicine Inpatient Service  8/3/2022 1:59 PM          Please note that this chart was generated using voice recognition Dragon dictation software.   Although every effort was made to ensure the accuracy of this automated transcription, some errors in transcription may have occurred

## 2022-08-03 NOTE — PLAN OF CARE
Problem: Discharge Planning  Goal: Discharge to home or other facility with appropriate resources  8/3/2022 1225 by Agustina Callejas RN  Outcome: Completed  8/3/2022 1224 by Agustina Callejas RN  Outcome: Adequate for Discharge  8/3/2022 0301 by Fenton Aase, RN  Outcome: Adequate for Discharge     Problem: ABCDS Injury Assessment  Goal: Absence of physical injury  8/3/2022 1225 by Agustina Callejas RN  Outcome: Completed  8/3/2022 1224 by Agustina Callejas RN  Outcome: Adequate for Discharge  8/3/2022 0301 by Fenton Aase, RN  Outcome: Adequate for Discharge     Problem: Safety - Adult  Goal: Free from fall injury  8/3/2022 1225 by Agustina Callejas RN  Outcome: Completed  8/3/2022 1224 by Agustina Callejas RN  Outcome: Adequate for Discharge  Flowsheets (Taken 8/3/2022 0740)  Free From Fall Injury:   Instruct family/caregiver on patient safety   Based on caregiver fall risk screen, instruct family/caregiver to ask for assistance with transferring infant if caregiver noted to have fall risk factors  8/3/2022 0301 by Fenton Aase, RN  Outcome: Adequate for Discharge     Problem: Pain  Goal: Verbalizes/displays adequate comfort level or baseline comfort level  8/3/2022 1225 by Agustina Callejas RN  Outcome: Completed  8/3/2022 1224 by Agustina Callejas RN  Outcome: Adequate for Discharge  Flowsheets (Taken 8/3/2022 0739)  Verbalizes/displays adequate comfort level or baseline comfort level:   Encourage patient to monitor pain and request assistance   Assess pain using appropriate pain scale  8/3/2022 0301 by Fenton Aase, RN  Outcome: Adequate for Discharge

## 2022-08-05 LAB
CULTURE: NORMAL
CULTURE: NORMAL
Lab: NORMAL
Lab: NORMAL
SPECIMEN DESCRIPTION: NORMAL
SPECIMEN DESCRIPTION: NORMAL

## 2022-08-16 ENCOUNTER — OFFICE VISIT (OUTPATIENT)
Dept: FAMILY MEDICINE CLINIC | Age: 52
End: 2022-08-16

## 2022-08-16 DIAGNOSIS — Z09 FOLLOW-UP EXAM: Primary | ICD-10-CM

## 2022-08-16 SDOH — ECONOMIC STABILITY: FOOD INSECURITY: WITHIN THE PAST 12 MONTHS, THE FOOD YOU BOUGHT JUST DIDN'T LAST AND YOU DIDN'T HAVE MONEY TO GET MORE.: NEVER TRUE

## 2022-08-16 SDOH — ECONOMIC STABILITY: FOOD INSECURITY: WITHIN THE PAST 12 MONTHS, YOU WORRIED THAT YOUR FOOD WOULD RUN OUT BEFORE YOU GOT MONEY TO BUY MORE.: NEVER TRUE

## 2022-08-16 ASSESSMENT — SOCIAL DETERMINANTS OF HEALTH (SDOH): HOW HARD IS IT FOR YOU TO PAY FOR THE VERY BASICS LIKE FOOD, HOUSING, MEDICAL CARE, AND HEATING?: NOT HARD AT ALL

## 2022-08-19 ENCOUNTER — OFFICE VISIT (OUTPATIENT)
Dept: FAMILY MEDICINE CLINIC | Age: 52
End: 2022-08-19

## 2022-08-19 VITALS
TEMPERATURE: 98.1 F | HEART RATE: 66 BPM | SYSTOLIC BLOOD PRESSURE: 111 MMHG | WEIGHT: 264 LBS | BODY MASS INDEX: 40.14 KG/M2 | DIASTOLIC BLOOD PRESSURE: 69 MMHG

## 2022-08-19 DIAGNOSIS — Z12.31 ENCOUNTER FOR SCREENING MAMMOGRAM FOR BREAST CANCER: ICD-10-CM

## 2022-08-19 DIAGNOSIS — R42 LIGHTHEADEDNESS: ICD-10-CM

## 2022-08-19 DIAGNOSIS — E87.1 HYPONATREMIA: ICD-10-CM

## 2022-08-19 DIAGNOSIS — M79.89 RIGHT LEG SWELLING: Primary | ICD-10-CM

## 2022-08-19 PROCEDURE — 99211 OFF/OP EST MAY X REQ PHY/QHP: CPT

## 2022-08-19 PROCEDURE — 99213 OFFICE O/P EST LOW 20 MIN: CPT

## 2022-08-19 RX ORDER — GUAIFENESIN 600 MG/1
600 TABLET, EXTENDED RELEASE ORAL 2 TIMES DAILY
Qty: 30 TABLET | Refills: 1 | Status: SHIPPED | OUTPATIENT
Start: 2022-08-19 | End: 2022-09-03

## 2022-08-19 ASSESSMENT — ENCOUNTER SYMPTOMS
SHORTNESS OF BREATH: 1
COUGH: 1
WHEEZING: 0

## 2022-08-19 NOTE — PATIENT INSTRUCTIONS
Thank you for letting us take care of you today. We hope all your questions were addressed. If a question was overlooked or something else comes to mind after you return home, please contact a member of your Care Team listed below. Your Care Team at [place] [address] is Team #4  Vinayak Stone MD (Faculty)  Zaid Herrera MD (Resident)  Deedee Mayer MD (Resident)  Deann Reyes MD (Resident)  Trever Canales MD (Resident)  FANY AlvaradoMARCUS  Yunzaina Kenny, [facility] office)  Zev Mead, [address] (Clinical Practice Manager)  Nima Garcia [facility] (Clinical Pharmacist)       Office phone number: [phone]    If you need to get in right away due to illness, please be advised we have \"Same Day\" appointments available Monday-Friday. Please call us at [phone] option #3 to schedule your \"Same Day\" appointment.

## 2022-08-19 NOTE — PROGRESS NOTES
Subjective:    Arleen Rodriges is a 45 y.o. 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 Presented to the office today for:  Chief Complaint   Patient presents with    Follow-Up from Hospital     Patient states she is doing a little better    Otalgia     Patient is still having bilateral ear pain 8/10 pain       Otalgia   Associated symptoms include coughing. Mrs Neisha Walker is a 45 y.o F presented to the clinic today for after hospitalization follow up. Pt was admitted and treated for pulmonary edema and had cardiac workup including cath with was normal except for high troponin of 30, Pt will follow up with cardiologist in a week. Pt was currently on metoprolol and lasix. Pt reported improvement in leg swelling, however the Rt side seems more swollen with no redness or warmth. Pt reported feeling thirsty and drinking a lot of water contributing it to being on water pills. Pt also complained from cough, however it is hard for her to cough the phlegm and feel it stuck in her through. She also reported lightheadedness. Breathing is better. Denied chest pain, SOB or palpitation       Review of Systems   Constitutional:  Negative for fever. HENT:  Positive for postnasal drip. Negative for ear pain. Respiratory:  Positive for cough and shortness of breath (improving). Negative for wheezing. Cardiovascular:  Positive for leg swelling (improving). Negative for chest pain and palpitations. Neurological:  Positive for light-headedness.                The patient has a   Family History   Problem Relation Age of Onset    Cancer Mother         breast and bone    Heart Disease Father     Diabetes Sister     High Blood Pressure Sister     Diabetes Brother     High Blood Pressure Brother     Heart Disease Maternal Grandmother     Cancer Maternal Grandmother         breast    Cancer Paternal Grandmother     Heart Disease Paternal Grandfather        Objective:    /69 (Site: Left Upper Arm, Position: Sitting, Cuff Size: Large Adult)   Pulse 66   Temp 98.1 °F (36.7 °C) (Temporal)   Wt 264 lb (119.7 kg)   BMI 40.14 kg/m²    BP Readings from Last 3 Encounters:   08/19/22 111/69   08/03/22 121/66   07/20/22 126/88       Physical Exam  Cardiovascular:      Rate and Rhythm: Normal rate and regular rhythm. Pulses: Normal pulses. Heart sounds: Normal heart sounds. Pulmonary:      Effort: Pulmonary effort is normal.      Breath sounds: Normal breath sounds. Musculoskeletal:      Right lower leg: Edema (+2, mild tenderness, no redness or warmth) present. Left lower leg: Edema (+1, no redness, warmth, or tenderness) present. Lab Results   Component Value Date    WBC 6.9 08/03/2022    HGB 11.6 (L) 08/03/2022    HCT 39.4 08/03/2022     08/03/2022    CHOL 195 04/28/2022    TRIG 84 04/28/2022    HDL 56 04/28/2022    ALT 74 (H) 07/31/2022     (H) 07/31/2022     (L) 08/03/2022    K 3.8 08/03/2022    CL 98 08/03/2022    CREATININE 0.61 08/03/2022    BUN 8 08/03/2022    CO2 22 08/03/2022    TSH 3.23 06/29/2018    INR 0.9 05/17/2016    LABA1C 5.2 04/28/2022     Lab Results   Component Value Date    CALCIUM 8.9 08/03/2022    PHOS 4.1 03/31/2012     Lab Results   Component Value Date    LDLCHOLESTEROL 122 04/28/2022       Assessment and Plan:    1. Encounter for screening  - LEONELA Digital Screen Bilateral; Future  - [place] guard     2. Right leg swelling  - Can take lasix every other day   - Has appt with Cardio in a week     3. Hyponatremia 2/2 water intoxication and diuretics   - , Repeat lab in 1 week  - Can take Lasix every other day  - Avoid excessive water intake     4.  Lightheadedness 2/2 hypotension versus hyponatremia   - BP today 11/69, HR 66  - Will decrease Metoprolo to 25 daily    Follow up in 1month, earlier if symptoms worsen         Requested Prescriptions     Signed Prescriptions Disp Refills    guaiFENesin (MUCINEX) 600 MG extended release tablet 30 tablet 1     Sig: Take 1 tablet by mouth 2 times daily for 15 days    metoprolol tartrate (LOPRESSOR) 25 MG tablet 60 tablet 3     Sig: Take 1 tablet by mouth daily       Medications Discontinued During This Encounter   Medication Reason    metoprolol tartrate (LOPRESSOR) 25 MG tablet        Reea received counseling on the following healthy behaviors: nutrition, exercise and medication adherence    Discussed use,benefit, and side effects of prescribed medications. Barriers to medication compliance addressed. All patient questions answered. Pt voiced understanding. Return in about 1 month (around 9/19/2022) for Phone visit for follow up . Disclaimer: Some orall of this note was transcribed using voice-recognition software. This may cause typographical errors occasionally. Although all effort is made to fix these errors, please do not hesitate to contact our office if there Garrett Arriaga concern with the understanding of this note.

## 2022-08-26 NOTE — PROGRESS NOTES
Attending Physician Statement  I  have discussed the care of Iman Valdez including pertinent history and exam findings with the resident. I agree with the assessment, plan and orders as documented by the resident. /69 (Site: Left Upper Arm, Position: Sitting, Cuff Size: Large Adult)   Pulse 66   Temp 98.1 °F (36.7 °C) (Temporal)   Wt 264 lb (119.7 kg)   BMI 40.14 kg/m²    BP Readings from Last 3 Encounters:   08/19/22 111/69   08/03/22 121/66   07/20/22 126/88     Wt Readings from Last 3 Encounters:   08/19/22 264 lb (119.7 kg)   07/31/22 266 lb 12.8 oz (121 kg)   07/20/22 254 lb (115.2 kg)          Diagnosis Orders   1. Right leg swelling  FIT-DNA (Cologuard)    Basic Metabolic Panel      2. Encounter for screening mammogram for breast cancer  LEONELA Digital Screen Bilateral      3. Hyponatremia        4.  Lightheadedness            Noreen Nguyen DO 8/26/2022 10:21 AM

## 2022-09-18 PROBLEM — Z12.31 ENCOUNTER FOR SCREENING MAMMOGRAM FOR BREAST CANCER: Status: RESOLVED | Noted: 2022-08-19 | Resolved: 2022-09-18

## 2022-10-19 ENCOUNTER — HOSPITAL ENCOUNTER (EMERGENCY)
Age: 52
Discharge: HOME | End: 2022-10-19
Attending: EMERGENCY MEDICINE

## 2022-10-19 VITALS
WEIGHT: 264 LBS | SYSTOLIC BLOOD PRESSURE: 157 MMHG | HEART RATE: 100 BPM | TEMPERATURE: 97.2 F | BODY MASS INDEX: 40.01 KG/M2 | OXYGEN SATURATION: 99 % | RESPIRATION RATE: 16 BRPM | HEIGHT: 68 IN | DIASTOLIC BLOOD PRESSURE: 84 MMHG

## 2022-10-19 DIAGNOSIS — H60.399 INFECTIVE OTITIS EXTERNA, UNSPECIFIED LATERALITY: ICD-10-CM
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
## 2022-10-19 PROCEDURE — 99284 EMERGENCY DEPT VISIT MOD MDM: CPT

## 2022-10-19 PROCEDURE — 6360000002 HC RX W HCPCS

## 2022-10-19 PROCEDURE — 96372 THER/PROPH/DIAG INJ SC/IM: CPT

## 2022-10-19 RX ORDER — KETOROLAC TROMETHAMINE 30 MG/ML
30 INJECTION, SOLUTION INTRAMUSCULAR; INTRAVENOUS ONCE
Status: COMPLETED | OUTPATIENT
Start: 2022-10-19 | End: 2022-10-19

## 2022-10-19 RX ORDER — CIPROFLOXACIN AND DEXAMETHASONE 3; 1 MG/ML; MG/ML
4 SUSPENSION/ DROPS AURICULAR (OTIC) 2 TIMES DAILY
Qty: 1 EACH | Refills: 0 | Status: SHIPPED | OUTPATIENT
Start: 2022-10-19 | End: 2022-10-29

## 2022-10-19 RX ADMIN — KETOROLAC TROMETHAMINE 30 MG: 30 INJECTION, SOLUTION INTRAMUSCULAR at 16:12

## 2022-10-19 ASSESSMENT — ENCOUNTER SYMPTOMS
VOMITING: 0
SHORTNESS OF BREATH: 0
ABDOMINAL PAIN: 0
PHOTOPHOBIA: 0
NAUSEA: 0
WHEEZING: 0
CHEST TIGHTNESS: 1

## 2022-10-19 ASSESSMENT — PAIN - FUNCTIONAL ASSESSMENT
PAIN_FUNCTIONAL_ASSESSMENT: ACTIVITIES ARE NOT PREVENTED
PAIN_FUNCTIONAL_ASSESSMENT: 0-10

## 2022-10-19 ASSESSMENT — PAIN SCALES - GENERAL
PAINLEVEL_OUTOF10: 8
PAINLEVEL_OUTOF10: 8

## 2022-10-19 ASSESSMENT — PAIN DESCRIPTION - ORIENTATION
ORIENTATION: RIGHT
ORIENTATION: RIGHT

## 2022-10-19 ASSESSMENT — PAIN DESCRIPTION - LOCATION
LOCATION: SHOULDER
LOCATION: CHEST;SHOULDER

## 2022-10-19 ASSESSMENT — PAIN DESCRIPTION - DESCRIPTORS
DESCRIPTORS: THROBBING;TENDER
DESCRIPTORS: ACHING

## 2022-10-19 ASSESSMENT — PAIN DESCRIPTION - ONSET: ONSET: ON-GOING

## 2022-10-19 ASSESSMENT — PAIN DESCRIPTION - FREQUENCY: FREQUENCY: CONTINUOUS

## 2022-10-19 ASSESSMENT — PAIN DESCRIPTION - PAIN TYPE: TYPE: ACUTE PAIN

## 2022-10-19 NOTE — Clinical Note
Dirk Vazquez was seen and treated in our emergency department on 10/19/2022. She may return to work on 10/20/2022. If you have any questions or concerns, please don't hesitate to call.       Armando Mendes MD
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
## 2022-12-15 ENCOUNTER — APPOINTMENT (OUTPATIENT)
Dept: CT IMAGING | Age: 52
End: 2022-12-15

## 2022-12-15 ENCOUNTER — HOSPITAL ENCOUNTER (EMERGENCY)
Age: 52
Discharge: HOME | End: 2022-12-15
Attending: EMERGENCY MEDICINE

## 2022-12-15 VITALS
DIASTOLIC BLOOD PRESSURE: 83 MMHG | SYSTOLIC BLOOD PRESSURE: 143 MMHG | RESPIRATION RATE: 16 BRPM | TEMPERATURE: 97.5 F | OXYGEN SATURATION: 100 % | HEART RATE: 73 BPM

## 2022-12-15 DIAGNOSIS — M54.31 BILATERAL SCIATICA: Primary | ICD-10-CM

## 2022-12-15 DIAGNOSIS — M54.32 BILATERAL SCIATICA: Primary | ICD-10-CM

## 2022-12-15 LAB
ABSOLUTE EOS #: 0.23 K/UL (ref 0–0.44)
ABSOLUTE IMMATURE GRANULOCYTE: <0.03 K/UL (ref 0–0.3)
ABSOLUTE LYMPH #: 2.45 K/UL (ref 1.1–3.7)
ABSOLUTE MONO #: 0.61 K/UL (ref 0.1–1.2)
ANION GAP SERPL CALCULATED.3IONS-SCNC: 11 MMOL/L (ref 9–17)
BASOPHILS # BLD: 0 % (ref 0–2)
BASOPHILS ABSOLUTE: <0.03 K/UL (ref 0–0.2)
BILIRUBIN URINE: NEGATIVE
BUN BLDV-MCNC: 9 MG/DL (ref 6–20)
CALCIUM SERPL-MCNC: 8.9 MG/DL (ref 8.6–10.4)
CHLORIDE BLD-SCNC: 101 MMOL/L (ref 98–107)
CO2: 23 MMOL/L (ref 20–31)
COLOR: YELLOW
CREAT SERPL-MCNC: 0.5 MG/DL (ref 0.5–0.9)
EOSINOPHILS RELATIVE PERCENT: 3 % (ref 1–4)
EPITHELIAL CELLS UA: ABNORMAL /HPF (ref 0–5)
GFR SERPL CREATININE-BSD FRML MDRD: >60 ML/MIN/1.73M2
GLUCOSE BLD-MCNC: 91 MG/DL (ref 70–99)
GLUCOSE URINE: NEGATIVE
HCT VFR BLD CALC: 37 % (ref 36.3–47.1)
HEMOGLOBIN: 11.5 G/DL (ref 11.9–15.1)
IMMATURE GRANULOCYTES: 0 %
KETONES, URINE: ABNORMAL
LEUKOCYTE ESTERASE, URINE: NEGATIVE
LYMPHOCYTES # BLD: 29 % (ref 24–43)
MCH RBC QN AUTO: 22.4 PG (ref 25.2–33.5)
MCHC RBC AUTO-ENTMCNC: 31.1 G/DL (ref 28.4–34.8)
MCV RBC AUTO: 72.1 FL (ref 82.6–102.9)
MONOCYTES # BLD: 7 % (ref 3–12)
MUCUS: ABNORMAL
NITRITE, URINE: NEGATIVE
NRBC AUTOMATED: 0 PER 100 WBC
PDW BLD-RTO: 15.1 % (ref 11.8–14.4)
PH UA: 5.5 (ref 5–8)
PLATELET # BLD: 244 K/UL (ref 138–453)
PMV BLD AUTO: 11.2 FL (ref 8.1–13.5)
POTASSIUM SERPL-SCNC: 3.7 MMOL/L (ref 3.7–5.3)
PROTEIN UA: NEGATIVE
RBC # BLD: 5.13 M/UL (ref 3.95–5.11)
RBC # BLD: ABNORMAL 10*6/UL
RBC UA: ABNORMAL /HPF (ref 0–2)
SEG NEUTROPHILS: 61 % (ref 36–65)
SEGMENTED NEUTROPHILS ABSOLUTE COUNT: 5.05 K/UL (ref 1.5–8.1)
SODIUM BLD-SCNC: 135 MMOL/L (ref 135–144)
SPECIFIC GRAVITY UA: 1.03 (ref 1–1.03)
TURBIDITY: CLEAR
URINE HGB: ABNORMAL
UROBILINOGEN, URINE: NORMAL
WBC # BLD: 8.4 K/UL (ref 3.5–11.3)
WBC UA: ABNORMAL /HPF (ref 0–5)

## 2022-12-15 PROCEDURE — 74174 CTA ABD&PLVS W/CONTRAST: CPT

## 2022-12-15 PROCEDURE — 96376 TX/PRO/DX INJ SAME DRUG ADON: CPT

## 2022-12-15 PROCEDURE — 6360000002 HC RX W HCPCS: Performed by: STUDENT IN AN ORGANIZED HEALTH CARE EDUCATION/TRAINING PROGRAM

## 2022-12-15 PROCEDURE — 3209999900 CT LUMBAR SPINE TRAUMA RECONSTRUCTION

## 2022-12-15 PROCEDURE — 80048 BASIC METABOLIC PNL TOTAL CA: CPT

## 2022-12-15 PROCEDURE — 99285 EMERGENCY DEPT VISIT HI MDM: CPT

## 2022-12-15 PROCEDURE — 96374 THER/PROPH/DIAG INJ IV PUSH: CPT

## 2022-12-15 PROCEDURE — 96375 TX/PRO/DX INJ NEW DRUG ADDON: CPT

## 2022-12-15 PROCEDURE — 96372 THER/PROPH/DIAG INJ SC/IM: CPT

## 2022-12-15 PROCEDURE — 85025 COMPLETE CBC W/AUTO DIFF WBC: CPT

## 2022-12-15 PROCEDURE — 81001 URINALYSIS AUTO W/SCOPE: CPT

## 2022-12-15 PROCEDURE — 6360000004 HC RX CONTRAST MEDICATION: Performed by: STUDENT IN AN ORGANIZED HEALTH CARE EDUCATION/TRAINING PROGRAM

## 2022-12-15 RX ORDER — DEXAMETHASONE SODIUM PHOSPHATE 10 MG/ML
10 INJECTION INTRAMUSCULAR; INTRAVENOUS ONCE
Status: COMPLETED | OUTPATIENT
Start: 2022-12-15 | End: 2022-12-15

## 2022-12-15 RX ORDER — PREDNISONE 50 MG/1
50 TABLET ORAL DAILY
Qty: 5 TABLET | Refills: 0 | Status: SHIPPED | OUTPATIENT
Start: 2022-12-15 | End: 2022-12-20

## 2022-12-15 RX ORDER — KETOROLAC TROMETHAMINE 15 MG/ML
15 INJECTION, SOLUTION INTRAMUSCULAR; INTRAVENOUS ONCE
Status: COMPLETED | OUTPATIENT
Start: 2022-12-15 | End: 2022-12-15

## 2022-12-15 RX ORDER — ORPHENADRINE CITRATE 30 MG/ML
60 INJECTION INTRAMUSCULAR; INTRAVENOUS ONCE
Status: COMPLETED | OUTPATIENT
Start: 2022-12-15 | End: 2022-12-15

## 2022-12-15 RX ORDER — LANOLIN ALCOHOL/MO/W.PET/CERES
3 CREAM (GRAM) TOPICAL DAILY
Qty: 30 TABLET | Refills: 3 | Status: SHIPPED | OUTPATIENT
Start: 2022-12-15

## 2022-12-15 RX ADMIN — DEXAMETHASONE SODIUM PHOSPHATE 10 MG: 10 INJECTION INTRAMUSCULAR; INTRAVENOUS at 06:49

## 2022-12-15 RX ADMIN — ORPHENADRINE CITRATE 60 MG: 30 INJECTION INTRAMUSCULAR; INTRAVENOUS at 05:29

## 2022-12-15 RX ADMIN — KETOROLAC TROMETHAMINE 15 MG: 15 INJECTION, SOLUTION INTRAMUSCULAR; INTRAVENOUS at 06:49

## 2022-12-15 RX ADMIN — IOPAMIDOL 100 ML: 755 INJECTION, SOLUTION INTRAVENOUS at 10:22

## 2022-12-15 RX ADMIN — KETOROLAC TROMETHAMINE 15 MG: 15 INJECTION, SOLUTION INTRAMUSCULAR; INTRAVENOUS at 11:21

## 2022-12-15 ASSESSMENT — ENCOUNTER SYMPTOMS
SORE THROAT: 0
NAUSEA: 0
RHINORRHEA: 0
BACK PAIN: 1
COUGH: 0
ABDOMINAL PAIN: 0
SHORTNESS OF BREATH: 0
VOMITING: 0

## 2022-12-15 ASSESSMENT — PAIN SCALES - GENERAL
PAINLEVEL_OUTOF10: 10
PAINLEVEL_OUTOF10: 7
PAINLEVEL_OUTOF10: 10

## 2022-12-15 ASSESSMENT — PAIN DESCRIPTION - DESCRIPTORS
DESCRIPTORS: ACHING;THROBBING;SHARP
DESCRIPTORS: ACHING;SHOOTING;SHARP
DESCRIPTORS: DISCOMFORT

## 2022-12-15 ASSESSMENT — PAIN DESCRIPTION - ONSET: ONSET: GRADUAL

## 2022-12-15 ASSESSMENT — PAIN DESCRIPTION - PAIN TYPE: TYPE: ACUTE PAIN

## 2022-12-15 ASSESSMENT — PAIN DESCRIPTION - LOCATION
LOCATION: HIP

## 2022-12-15 ASSESSMENT — PAIN DESCRIPTION - ORIENTATION
ORIENTATION: LEFT

## 2022-12-15 ASSESSMENT — PAIN DESCRIPTION - FREQUENCY: FREQUENCY: CONTINUOUS

## 2022-12-15 ASSESSMENT — PAIN - FUNCTIONAL ASSESSMENT: PAIN_FUNCTIONAL_ASSESSMENT: 0-10

## 2022-12-15 NOTE — ED PROVIDER NOTES
[facility]  ED  Emergency Department  Emergency Medicine Sign-out     Care of Roberto Brito was assumed from Dr. Dereck Reddy and is being seen for Hip Pain (Pt c/o left hip pain that started after a 15 hours shift on Tuesday. Pt states pain is radiating up back and making it difficult to walk)  . The patient's initial evaluation and plan have been discussed with the prior attending who initially evaluated the patient. EMERGENCY DEPARTMENT COURSE / MEDICAL DECISION MAKING:       MEDICATIONS GIVEN:  Orders Placed This Encounter   Medications    orphenadrine (NORFLEX) injection 60 mg    ketorolac (TORADOL) injection 15 mg    dexamethasone (DECADRON) injection 10 mg    iopamidol (ISOVUE-370) 76 % injection 100 mL    predniSONE (DELTASONE) 50 MG tablet     Sig: Take 1 tablet by mouth daily for 5 days     Dispense:  5 tablet     Refill:  0    melatonin (RA MELATONIN) 3 MG TABS tablet     Sig: Take 1 tablet by mouth daily     Dispense:  30 tablet     Refill:  3       LABS / RADIOLOGY:     Labs Reviewed   CBC WITH AUTO DIFFERENTIAL - Abnormal; Notable for the following components:       Result Value    RBC 5.13 (*)     Hemoglobin 11.5 (*)     MCV 72.1 (*)     MCH 22.4 (*)     RDW 15.1 (*)     All other components within normal limits   URINALYSIS WITH REFLEX TO CULTURE - Abnormal; Notable for the following components:    Ketones, Urine TRACE (*)     Specific Gravity, UA 1.034 (*)     Urine Hgb SMALL (*)     All other components within normal limits   MICROSCOPIC URINALYSIS - Abnormal; Notable for the following components:    Mucus, UA 2+ (*)     All other components within normal limits   BASIC METABOLIC PANEL       No results found. RECENT VITALS:     Temp: 97.5 °F (36.4 °C),  Heart Rate: 73, Resp: 16, BP: (!) 143/83, SpO2: 100 %    This patient is a 45 y.o. Female with left-sided hip and lower back pain radiating down her left leg and up her left arm.   Patient has history of sciatic nerve pain and

## 2022-12-15 NOTE — ED PROVIDER NOTES
[address] [facility]  FACULTY HANDOFF       Handoff taken on the following patient from prior Attending Physician: Dr. Peace Khan  Pt Name: Serafina Koyanagi  PCP:  Sascha Trotter MD    Attestation  I was available and discussed any additional care issues that arose and coordinated the management plans with the resident(s) caring for the patient during my duty period. Any areas of disagreement with resident's documentation of care or procedures are noted on the chart. I was personally present for the key portions of any/all procedures during my duty period. I have documented in the chart those procedures where I was not present during the key portions. CHIEF COMPLAINT       Chief Complaint   Patient presents with    Hip Pain     Pt c/o left hip pain that started after a 15 hours shift on Tuesday. Pt states pain is radiating up back and making it difficult to walk         CURRENT MEDICATIONS     Previous Medications  Previous Medications    ATORVASTATIN (LIPITOR) 40 MG TABLET    Take 1 tablet by mouth nightly    BENZOCAINE-MENTHOL (CEPACOL SORE THROAT) 15-3.6 MG LOZENGE    Take 1 lozenge by mouth every 2 hours as needed for Sore Throat    DICYCLOMINE (BENTYL) 10 MG CAPSULE    Take 1 capsule by mouth every 6 hours as needed (cramps)    ELASTIC BANDAGES & SUPPORTS (KNEE BRACE ADJUSTABLE HINGED) MISC    1 Device by Does not apply route as needed (comfort)    ELASTIC BANDAGES & SUPPORTS (TENNIS ELBOW SUPPORT) MISC    1 Device by Does not apply route as needed    EPINEPHRINE (EPIPEN) 0.3 MG/0.3ML SOAJ INJECTION    Inject 0.3 mg into the muscle as needed Use as directed for allergic reaction    FUROSEMIDE (LASIX) 20 MG TABLET    Take 1 tablet by mouth in the morning. LAMOTRIGINE (LAMICTAL) 100 MG TABLET    Take 1 tablet by mouth in the morning and 1 tablet before bedtime. LIDOCAINE (LIDODERM) 5 %    Place 1 patch onto the skin in the morning. 12 hours on, 12 hours off. Purvi Dave     METOPROLOL TARTRATE Az Jha MD  12/15/22 0624

## 2022-12-15 NOTE — ED NOTES
Pt ambulated to restroom with a steady but slow gait. Unassisted back to cot. CT tech updated that pt has CT IV placed per PICC. Pt updated on plans of care.      Ashley Caraballo RN  12/15/22 1014

## 2022-12-15 NOTE — ED PROVIDER NOTES
[facility]  ED  Emergency Department Encounter  Emergency Medicine Resident     Pt Fausto Slade  MRN: [id number]  [place] 1970  Date of evaluation: 12/15/22  PCP:  Jez Osborne MD      [address] [place] [address]       Chief Complaint   Patient presents with    Hip Pain     Pt c/o left hip pain that started after a 15 hours shift on Tuesday. Pt states pain is radiating up back and making it difficult to walk       HISTORY OF PRESENT ILLNESS  (Location/Symptom, Timing/Onset, Context/Setting, Quality, Duration, Modifying Factors, Severity.)      Benedicto Kong is a 45 y.o. female who presents with left-sided back pain and leg pain. Patient reports she had a long workday about 2 days ago. She reports after that workday she noticed increased pain to her left hip lower back radiating down her left leg. Patient reports her pain is unrelieved by Aleve and she feels that this may be an exacerbation of her sciatica pain. Patient reports the pain radiates up the left side of her back and down her left arm. Patient reports mild weakness to the left leg and sensation that the leg is \"going to go out from underneath her \". Patient reports she has tried physical therapy before for her sciatic pain but reports that it does not improve at all. Patient denies urinary or fecal incontinence or numbness to the saddle area. 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 has a past surgical history that includes Hysterectomy; Breast surgery; Tonsillectomy; Adenoidectomy; sinus surgery; Cosmetic surgery; knee surgery; and Cardiac catheterization (7-).     Social History     Socioeconomic History    Marital status: Single     Spouse name: Not on file    Number of children: Not on file    Years of education: Not on file    Highest education level: Not on file   Occupational History    Not on file   Tobacco Use    Smoking status: Never    Smokeless tobacco: Never   Vaping Use    Vaping Use: Never used   Substance and Sexual Activity    Alcohol use: Yes     Comment: occasionally    Drug use: Yes     Types: Marijuana [place])    Sexual activity: Not on file   Other Topics Concern    Not on file   Social History Narrative    Barrier to care due to finances and no insurance. Social Determinants of Health     Financial Resource Strain: Low Risk     Difficulty of Paying Living Expenses: Not hard at all   Food Insecurity: No Food Insecurity    Worried About Running Out of Food in the Last Year: Never true    Ran Out of Food in the Last Year: Never true   Transportation Needs: Not on file   Physical Activity: Not on file   Stress: Not on file   Social Connections: Not on file   Intimate Partner Violence: Not on file   Housing Stability: Not on file       Family History   Problem Relation Age of Onset    Cancer Mother         breast and bone    Heart Disease Father     Diabetes Sister     High Blood Pressure Sister     Diabetes Brother     High Blood Pressure Brother     Heart Disease Maternal Grandmother     Cancer Maternal Grandmother         breast    Cancer Paternal Grandmother     Heart Disease Paternal Grandfather        Allergies:  Latex, Motrin [ibuprofen], Norco [hydrocodone-acetaminophen], Tramadol, Naproxen, and Tylenol [acetaminophen]    Home Medications:  Prior to Admission medications    Medication Sig Start Date End Date Taking? Authorizing Provider   metoprolol tartrate (LOPRESSOR) 25 MG tablet Take 1 tablet by mouth daily 8/19/22   Zuly Henry MD   lidocaine (LIDODERM) 5 % Place 1 patch onto the skin in the morning. 12 hours on, 12 hours off. . 8/3/22   Hector Caraballo MD   atorvastatin (LIPITOR) 40 MG tablet Take 1 tablet by mouth nightly 8/3/22   Volodymyr Price MD   furosemide (LASIX) 20 MG tablet Take 1 tablet by mouth in the morning. 8/3/22   ANNA Benito - CNP   lamoTRIgine (LAMICTAL) 100 MG tablet Take 1 tablet by mouth in the morning and 1 tablet before bedtime. 7/30/22   Alejandra Carrasquillo MD   losartan (COZAAR) 50 mg tablet Take 1 tablet by mouth in the morning and at bedtime 6/10/22 8/3/22  Sharri Echevarria MD   benzocaine-menthol (CEPACOL SORE THROAT) 15-3.6 MG lozenge Take 1 lozenge by mouth every 2 hours as needed for Sore Throat 9/18/19   Manda Barba PA-C   Elastic Bandages & Supports (KNEE BRACE ADJUSTABLE HINGED) MISC 1 Device by Does not apply route as needed (comfort) 12/19/18   Juan David Calderón MD   dicyclomine (BENTYL) 10 MG capsule Take 1 capsule by mouth every 6 hours as needed (cramps) 5/26/18   Crow Gordon, DO   Misc. Devices (WRIST BRACE) MISC 1 Device by Does not apply route daily One brace to be worn at night and daily as needed for carpal tunnel syndrome. Brace should place the wrist in neutral. 2/20/17   Severino River DO   EPINEPHrine (EPIPEN) 0.3 MG/0.3ML SOAJ injection Inject 0.3 mg into the muscle as needed Use as directed for allergic reaction    Historical Provider, MD   Elastic Bandages & Supports ([address]) MISC 1 Device by Does not apply route as needed 6/2/15   Tono Gómez MD   indomethacin (INDOCIN) 50 MG capsule Take 1 capsule by mouth 3 times daily (with meals). 7/26/14 8/3/22  Mary Kay Alvarado MD       REVIEW OF SYSTEMS    (2-9 systems for level 4, 10 or more for level 5)      Review of Systems   Constitutional:  Negative for activity change, appetite change, chills, fatigue and fever. HENT:  Positive for ear pain. Negative for rhinorrhea and sore throat. Respiratory:  Negative for cough and shortness of breath. Cardiovascular:  Positive for leg swelling. Negative for chest pain. Gastrointestinal:  Negative for abdominal pain, nausea and vomiting. Genitourinary:  Positive for flank pain. Negative for dysuria. Musculoskeletal:  Positive for arthralgias, back pain, gait problem and myalgias. Negative for joint swelling, neck pain and neck stiffness. Skin:  Negative for rash. Neurological:  Positive for weakness and headaches. Negative for dizziness and light-headedness. PHYSICAL EXAM   (up to 7 for level 4, 8 or more for level 5)      INITIAL VITALS:   BP (!) 143/83   Pulse 73   Temp 97.5 °F (36.4 °C)   Resp 16   SpO2 100%     Physical Exam  Vitals reviewed. Constitutional:       General: She is not in acute distress. Appearance: Normal appearance. She is not diaphoretic. HENT:      Head: Normocephalic and atraumatic. Right Ear: Ear canal and external ear normal. A middle ear effusion is present. Left Ear: Ear canal and external ear normal. A middle ear effusion is present. Nose: Nose normal.      Mouth/Throat:      Mouth: Mucous membranes are moist.   Eyes:      Conjunctiva/sclera: Conjunctivae normal.      Pupils: Pupils are equal, round, and reactive to light. Cardiovascular:      Rate and Rhythm: Normal rate and regular rhythm. Heart sounds: Normal heart sounds. Pulmonary:      Effort: Pulmonary effort is normal. No respiratory distress. Breath sounds: Normal breath sounds. Abdominal:      Palpations: Abdomen is soft. Tenderness: There is no abdominal tenderness. Musculoskeletal:         General: No swelling, deformity or signs of injury. Cervical back: Normal range of motion and neck supple. No tenderness. Thoracic back: Tenderness present. Lumbar back: Tenderness present. Negative right straight leg raise test and negative left straight leg raise test.      Left hip: Decreased range of motion (Due to pain). Right lower leg: No edema. Left lower leg: No edema. Skin:     General: Skin is warm and dry. Neurological:      General: No focal deficit present.       Mental Status: She is alert and oriented to person, place, and time. Sensory: No sensory deficit. Motor: No weakness.       Gait: Gait normal.       DIFFERENTIAL  DIAGNOSIS     PLAN (LABS / IMAGING / EKG):  Orders Placed This Encounter   Procedures    CT LUMBAR SPINE TRAUMA RECONSTRUCTION    CTA ABDOMEN PELVIS W CONTRAST    CBC with Auto Differential    BMP    Urinalysis with Reflex to Culture    Microscopic Urinalysis       MEDICATIONS ORDERED:  Orders Placed This Encounter   Medications    orphenadrine (NORFLEX) injection 60 mg    ketorolac (TORADOL) injection 15 mg    dexamethasone (DECADRON) injection 10 mg    iopamidol (ISOVUE-370) 76 % injection 100 mL       DDX: Cauda equina, sciatica, vertebral disc impingement, AAA, urinary tract infection    DIAGNOSTIC RESULTS / EMERGENCY DEPARTMENT COURSE / MDM   LAB RESULTS:  Results for orders placed or performed during the hospital encounter of 12/15/22   CBC with Auto Differential   Result Value Ref Range    WBC 8.4 3.5 - 11.3 k/uL    RBC 5.13 (H) 3.95 - 5.11 m/uL    Hemoglobin 11.5 (L) 11.9 - 15.1 g/dL    Hematocrit 37.0 36.3 - 47.1 %    MCV 72.1 (L) 82.6 - 102.9 fL    MCH 22.4 (L) 25.2 - 33.5 pg    MCHC 31.1 28.4 - 34.8 g/dL    RDW 15.1 (H) 11.8 - 14.4 %    Platelets 692 067 - 826 k/uL    MPV 11.2 8.1 - 13.5 fL    NRBC Automated 0.0 0.0 per 100 WBC    Seg Neutrophils 61 36 - 65 %    Lymphocytes 29 24 - 43 %    Monocytes 7 3 - 12 %    Eosinophils % 3 1 - 4 %    Basophils 0 0 - 2 %    Immature Granulocytes 0 0 %    Segs Absolute 5.05 1.50 - 8.10 k/uL    Absolute Lymph # 2.45 1.10 - 3.70 k/uL    Absolute Mono # 0.61 0.10 - 1.20 k/uL    Absolute Eos # 0.23 0.00 - 0.44 k/uL    Basophils Absolute <0.03 0.00 - 0.20 k/uL    Absolute Immature Granulocyte <0.03 0.00 - 0.30 k/uL    RBC Morphology ANISOCYTOSIS PRESENT    BMP   Result Value Ref Range    Glucose 91 70 - 99 mg/dL    BUN 9 6 - 20 mg/dL    Creatinine 0.50 0.50 - 0.90 mg/dL    Est, Glom Filt Rate >60 >60 mL/min/1.73m2    Calcium 8.9 8.6 - 10.4 mg/dL    Sodium 135 135 - 144 mmol/L    Potassium 3.7 3.7 - 5.3 mmol/L    Chloride 101 98 - 107 mmol/L    CO2 23 20 - 31 mmol/L    Anion Gap 11 9 - 17 mmol/L   Urinalysis with Reflex to Culture    Specimen: Urine   Result Value Ref Range    Color, UA Yellow Yellow    Turbidity UA Clear Clear    Glucose, Ur NEGATIVE NEGATIVE    Bilirubin Urine NEGATIVE NEGATIVE    Ketones, Urine TRACE (A) NEGATIVE    Specific Gravity, UA 1.034 (H) 1.005 - 1.030    Urine Hgb SMALL (A) NEGATIVE    pH, UA 5.5 5.0 - 8.0    Protein, UA NEGATIVE NEGATIVE    Urobilinogen, Urine Normal Normal    Nitrite, Urine NEGATIVE NEGATIVE    Leukocyte Esterase, Urine NEGATIVE NEGATIVE   Microscopic Urinalysis   Result Value Ref Range    WBC, UA 2 TO 5 0 - 5 /HPF    RBC, UA 20 TO 50 0 - 2 /HPF    Epithelial Cells UA 0 TO 2 0 - 5 /HPF    Mucus, UA 2+ (A) None       IMPRESSION: Patient is a 78-year-old female with history of sciatic pain who presents with left lower back pain radiating down the leg and up the back down the arm. Patient appears to be in a sciatic flare at this time however due to radiant aspect of pain AAA should be ruled out. RADIOLOGY:  CT LUMBAR SPINE TRAUMA RECONSTRUCTION    (Results Pending)   CTA ABDOMEN PELVIS W CONTRAST    (Results Pending)           EMERGENCY DEPARTMENT COURSE:    ED Course as of 12/15/22 0805   Thu Dec 15, 2022   0723 Patient reported no improvement in pain after Norflex. Toradol and Decadron prescribed [HO]   8390 Patient care handed off to Dr. Aragon [facility] Rahul Damico MD       No notes of [facility] Admission Criteria type on file. PROCEDURES:  none    CONSULTS:  None    CRITICAL CARE:  See attending note    FINAL IMPRESSION      No diagnosis found.     Patient care handed off to Dr. Marisela Aguila / [facility]    Patient care handed off to Dr. Garett Iglesias:  No follow-up provider specified.     DISCHARGE MEDICATIONS:  New Prescriptions    No medications on file       Gerda Hays MD  Emergency Medicine Resident    (Please note that portions of thisnote were completed with a voice recognition program.  Efforts were made to edit the dictations but occasionally words are mis-transcribed.)       Joycelyn Cabrera MD  Resident  12/15/22 7825

## 2022-12-15 NOTE — ED PROVIDER NOTES
[facility]     Emergency Department     Faculty Attestation    I performed a history and physical examination of the patient and discussed management with the resident. I have reviewed and agree with the residents findings including all diagnostic interpretations, and treatment plans as written. Any areas of disagreement are noted on the chart. I was personally present for the key portions of any procedures. I have documented in the chart those procedures where I was not present during the key portions. I have reviewed the emergency nurses triage note. I agree with the chief complaint, past medical history, past surgical history, allergies, medications, social and family history as documented unless otherwise noted below. Documentation of the HPI, Physical Exam and Medical Decision Making performed by [email or web address] is based on my personal performance of the HPI, PE and MDM. For Physician Assistant/ Nurse Practitioner cases/documentation I have personally evaluated this patient and have completed at least one if not all key elements of the E/M (history, physical exam, and MDM). Additional findings are as noted. 46 yo F c/o L hip // L back pain after working long shift, pain radiating down L leg, no direct injury, hx of sciatica, no fever, on urinary retention or incontinence, no hx ivda,   PE vss gcs 15 [place] LPN escort for exam: abdomen non tender, no distension, no rigidity, no mass, tender L lateral to L345, muscle strain pattern, skin intact, no indication of infection \ injury, thigh extension strong / =, patellar dtr symmetric, sensation intact bilateral lower extremity    Eval started / care turned over to day shift    EKG Interpretation    Interpreted by me      CRITICAL CARE: There was a high probability of clinically significant/life threatening deterioration in this patient's condition which required my urgent intervention.   Total critical care time was 10 minutes. This excludes any time for separately reportable procedures.        [place], DO  12/15/22 1850 [facility], DO  12/15/22 7043

## 2022-12-15 NOTE — ED NOTES
This patient was assessed by the doctor only.  Nurse processed and completed the orders from the doctor ie labs, meds, and/or EKG       Rena Carr LPN  56/11/26 [id number]

## 2022-12-15 NOTE — ED TRIAGE NOTES
Pt presents to the ed with c/o left hip after working a 15 hour shift. Pt stated she has a history of sciatic nerve pain on her left side. Pt stated pain level is a level 10. Pt was assisted to the stretcher. Pt alert and oriented. Doctor at bedside. Plan of care will continue.

## 2022-12-15 NOTE — ED NOTES
Pt observed resting on her side on cot without obvious distress at this time  Pt sts any movement caused pain in left hip shooting down into her leg     Flor Jackson, [address] [place] [address]  12/15/22 0703

## 2022-12-15 NOTE — ED NOTES
CARDIOLOGY PROGRESS NOTE       HISTORY     Alyssa Johnson is a 82 year old female who presents for a follow up visit. She has a history of chronic atrial fibrillation, coronary artery disease s/p stent placement 01/2019, hyperlipidemia, hypertension, and diabetes. Since her last visit 11/03/2021 the patient was recently admitted from 01/03-01/07/2021 for acute hypoxic respiratory failure secondary to acute diastolic heart failure. Since that time the patient states that her breathing is markedly improved. She is now on chronic supplemental oxygen. The patient denies any chest pains, palpitations, or shortness of breath. The patient denies any lightheadedness or dizziness. There is no orthopnea or PND. No other complaints at this time.    The patient was recently admitted from 10/01-10/05/2021 for left femoral artery occlusion s/p thromboembolectomy of left ileofemoral, popliteal, and posterior tibial arteries 10/01/2021, COVID positive, Klebsiella pneumoniae UTI, acute renal failure, and hypokalemia.    Per his initial cardiology consultation in 09/2020 for evaluation of coronary artery disease: The patient has known coronary artery disease and recently moved from [place].  She states that in early January she was experiencing tightness in the left and right side of her chest radiating up into her neck.  The patient was admitted to the hospital and subsequently underwent a stent placement.  None of these records are available.  She does have history of atrial fibrillation.  The patient has been on chronic anticoagulation.  She is not having any lightheadedness or dizziness.  There is no orthopnea or PND.  The patient does have history of lower extremity edema but her leg is always swollen because of her knee surgery.  This is nothing new.  The patient denies any syncopal episode.  She does not smoke and occasionally has alcohol.  The patient is retired.  The family history is remarkable for her mother who was  Updated on Zahc Fajardo to attempt IV placement with US  CT updated     Austin Giraldo, RN  12/15/22 [id number] diagnosed with coronary artery disease and congestive heart failure in her 60s.    11/04/2020  The patient's records from [place] were reviewed.  A transthoracic echocardiogram done on 08/10/2019 revealed an ejection fraction of 45% with apical hypokinesis.  The right ventricular ejection fraction was normal.  There was mild tricuspid regurgitation.  A cardiac catheterization done on 08/10/2019 revealed the left main to be normal, left circumflex was normal, the right coronary artery was normal and the left anterior descending was 100% occluded proximally.  She subsequently underwent a 3 x 16 synergy stent with no residual flow.     CTA Abd/Aorta/Ileofems 10/01/2021:  IMPRESSION:  1. Large filling defect occluding distal left common femoral artery--by configuration appears to be a thromboembolus of indeterminate age.  2. More distal occlusion of the mid SFA through the popliteal artery and extending into the trifurcation vessels throughout the calf--with distal reconstitution of the left posterior tibial artery.  3. There is no evidence of underlying aortoiliac stenosis or left lower extremity arterial disease or occlusion. No arterial calcification of the lower semis bilaterally as present to suggest underlying chronic arterial disease bilaterally.  4. Abnormal enhancement of the spleen raises concern of potential  acute/subacute splenic infarct.  5. No other arterial filling defect is noted throughout the abdomen,  pelvis, or right lower extremity to suggest other potential thromboemboli.  6. Scattered colonic diverticulosis without evidence of diverticulitis.    Echocardiogram 10/01/2021: EF 55%  Aortic valve sclerosis.  Moderate tricuspid valve regurgitation.  Right ventricular systolic pressure 46.2 mmHg.  Mild mitral valve regurgitation.  Normal left ventricular size, systolic function and wall thickness, with no regional wall motion abnormalities.    Echocardiogram 10/08/2020:  Normal left ventricular  cavity size.   Normal left ventricular wall thickness.   Normal left ventricular systolic function.   Left ventricular ejection fraction, 52 %.   Hypokinesis of the mid anteroseptal LV wall.   Indeterminate diastolic function.  Moderate tricuspid valve regurgitation.    MEDICAL HISTORY     Past Medical History:   Diagnosis Date   • AF (atrial fibrillation) (CMS/HCC)    • Asthma    • Congestive cardiac failure (CMS/HCC)    • Glaucoma    • History of embolectomy 10/02/2021    Thromboembolectomy of left iliofemoral vessels as well as the popliteal and posterior tibial arteries   • Hyperlipoproteinemia    • Myocardial infarction (CMS/HCC) 2019    Dr. Sarmad Mares in [place] [phone]       SURGICAL HISTORY     Past Surgical History:   Procedure Laterality Date   • Appendectomy     • Cataract extraction w/ intraocular lens implant  2016    in [place]   • Cholecystectomy     • Hernia repair     • Hysterectomy     • Stent implant  2019    Done in [place], prior Cardiologist Dr. Sarmad Mares [phone]   • Thrombectomy w/ embolectomy Left 10/02/2021    Thromboembolectomy of left iliofemoral vessels as well as the popliteal and posterior tibial arteries   • Toe arthroplasty     • Total knee arthroplasty Bilateral        SOCIAL HISTORY     Social History     Tobacco Use   • Smoking status: Former Smoker   • Smokeless tobacco: Never Used   Vaping Use   • Vaping Use: never used   Substance Use Topics   • Alcohol use: Not Currently     Alcohol/week: 1.0 standard drink     Types: 1 Glasses of wine per week     Comment: occasional   • Drug use: Never       FAMILY HISTORY     Family History   Problem Relation Age of Onset   • Congestive Heart Failure Mother    • Other Mother         hole in heart   • Heart disease Father         angina   • Other Maternal Grandmother         weak heart valve   • Patient is unaware of any medical problems Maternal Grandfather    • Patient is unaware of any medical problems Paternal Grandmother     • Patient is unaware of any medical problems Paternal Grandfather        MEDICATIONS     Current Outpatient Medications   Medication Sig   • levothyroxine 100 MCG tablet Take 100 mcg by mouth daily.   • oxygen (O2) gas Inhale 2 L/min into the lungs continuous.   • carvedilol (COREG) 3.125 MG tablet Take 1 tablet by mouth 2 times daily.   • sacubitril-valsartan (ENTRESTO) 24-26 MG per tablet Take 1 tablet by mouth 2 times daily.   • aspirin 81 MG chewable tablet Chew 81 mg by mouth daily.   • ferrous sulfate 325 (65 FE) MG tablet Take 325 mg by mouth every other day.   • potassium CHLORIDE (KLOR-CON M) 20 MEQ jackelyn ER tablet Take 20 mEq by mouth 2 times daily.   • ZINC ACETATE EX Apply topically to legs as needed.   • pantoprazole (PROTONIX) 40 MG tablet Take 1 tablet by mouth daily.   • furosemide (Lasix) 20 MG tablet Take 1 tablet by mouth daily.   • atorvastatin (LIPITOR) 80 MG tablet Take 1 tablet by mouth once daily   • Artificial Tear Ointment (DRY EYES OP) Apply 1 drop to eye as needed.   • acetaminophen (Tylenol) 325 MG tablet Take 2 tablets by mouth every 6 hours as needed for Pain.   • Lactobacillus (PROBIOTIC ACIDOPHILUS PO) Take 1 capsule by mouth daily.    • Ventolin  (90 Base) MCG/ACT inhaler INHALE 1 TO 2 PUFFS BY MOUTH AND INTO LUNGS EVERY 8 HOURS AS NEEDED.   • VITAMIN D, CHOLECALCIFEROL, PO Take 1 capsule by mouth daily.      No current facility-administered medications for this visit.       ALLERGIES     ALLERGIES:   Allergen Reactions   • Corticosteroids RASH and SHORTNESS OF BREATH     Skin peeling   • Codeine NAUSEA and PRURITUS   • Latex HIVES and PRURITUS   • Prednisone Other (See Comments)     Skin peeling       PHYSICAL EXAM     Vitals:    Visit Vitals  BP 94/60   Pulse 96   Resp (!) 28   Ht 5' 5\" (1.651 m)   Wt 99.6 kg (219 lb 9.6 oz)   BMI 36.54 kg/m²     General:  NAD, pleasant, alert and oriented x3.  Neck:  No carotid bruits.  No JVD (jugular venous distension).  Normal carotid  upstroke.  Pulmonary:  No retractions or increased work of breathing.  Clear to auscultation bilaterally.  No crackles or wheezing.  Cardiovascular:  PMI (point of maximal impulse) in 5th intercostal place. Irregularly irregular variable S1 normal S2 with no S3 appreciated there is a 2/6 systolic ejection murmur the right upper sternal border.  Abdomen:  Bowel sounds normoactive.  Soft, nontender, nondistended.  No hepatosplenomegaly.  Extremities:  1+ BLE edema, no cyanosis.     Cholesterol (mg/dL)   Date Value   05/05/2021 90     HDL (mg/dL)   Date Value   05/05/2021 37 (L)     Cholesterol/ HDL Ratio (no units)   Date Value   05/05/2021 2.4     Triglycerides (mg/dL)   Date Value   05/05/2021 99     LDL (mg/dL)   Date Value   05/05/2021 33     Creatinine (mg/dL)   Date Value   01/07/2022 0.70     BUN (mg/dL)   Date Value   01/07/2022 18     Hemoglobin A1C (%)   Date Value   10/13/2021 5.8 (H)     CHADS VASc Score   Congestive Heart Failure: 0 (YES: 1, NO: 0)   Hypertension: 1 (YES: 1, NO: 0)   Age: 2 (<65: 0, 65-74: 1, >75: 2)   Diabetes Mellitus: 1 (YES: 1, NO: 0)   Stroke/TIA (transient ischemic attack)/Thromboembolism: 0 (YES: 2, NO: 0)   Vascular Disease: 0 (YES: 1, NO: 0)   Sex: 1 (Male: 0, Female: 1)   Total Score: 5  CHADSVASC clinical risk estimation. Adapted from Nina et al.     [id number]   SCORE  ADJUSTED STROKE RATE (% year)    0  0%    1  1,3%    2  2,2%    3  3,2%    4  4,0%    5  6,7%    6  9,8%    7  9,6%    8  6,7%    9  15,2%          ASSESSMENT     1. Chronic atrial fibrillation,   - off anticoagulation due to SDH 08/2021 per neurosurgery  2. Coronary artery disease status post LAD stent placement 08/10/2019 in [place]  3. Hyperlipidemia  4. Hypertension  5. Diabetes  6. Left femoral artery occlusion s/p thromboembolectomy of left ileofemoral, popliteal, and posterior tibial arteries 10/01/2021  7. COVID infection 10/2021  8. Diastolic heart failure  -last admission 01/2022    PLAN      Alyssa Johnson is a 82 year old female with a history of chronic atrial fibrillation, coronary artery disease, hyperlipidemia, hypertension, diabetes, peripheral vascular disease, and diastolic heart failure who was recently admitted for acute hypoxic respiratory failure secondary to acute diastolic heart failure, Today the patient is not having any complaints of chest pain, and her breathing is markedly improved since her hospitalization. She now continues on chronic supplemental oxygen. She was admitted in August 2021 following a fall with subdural hematoma, per neurosurgery the patient cannot be on any anticoagulation so her Warfarin was discontinued. She was also admitted beginning of October 2021 for left femoral artery occlusion s/p thromboembolectomy of left ileofemoral, popliteal, and posterior tibial arteries 10/01/2021, COVID positive, Klebsiella pneumoniae UTI, acute renal failure, and hypokalemia.  The patient does understand that there is a risk of a stroke because she is not on anticoagulation.  Neurosurgery stated that absolutely no anticoagulation.  She is doing well since her hospitalizations. Her blood pressure, and pulse are normal. I also briefly discussed with the patient and her daughter that if the patient can be placed on short-term anticoagulation in the future she would be a candidate for Watchman implant. She is now on the Entresto. Her echocardiogram 10/08/2020 was stable, EF 52%, and moderate TR. Her hyperlipidemia is well controlled on the current regimen with the blood work done in May 2021. She is otherwise stable from a cardiac standpoint. I will follow up with her in 4 months, or sooner if she has any concerns. Patient is instructed to monitor her weight daily, if she gains 3 pounds in a day or 5 pounds in a week she should contact my office.Thank you for allowing me to participate in the care of this patient.    On 1/11/2022, I Parrish Del Real, personally transcribed this  document on behalf of Dr. Aquiles Aleman MD.    The documentation recorded by the scribe accurately and completely reflects the service(s) I personally performed and the decisions made by me.         Sincerely,    Aquiles Aleman MD.-PhD, [facility]

## 2022-12-15 NOTE — Clinical Note
Debbie Yeboah was seen and treated in our emergency department on 12/15/2022. She may return to work on 12/15/2022. No stairs for 5 days     If you have any questions or concerns, please don't hesitate to call.       Cookie Cordoba MD

## 2022-12-15 NOTE — ED NOTES
Report recd from [facility] LPN  Pt to have CT, concerns for IV reported, writer will evaluate and notify CT  No concerns voiced     Gabrielle Barragan RN  12/15/22 [id number]

## 2022-12-15 NOTE — DISCHARGE INSTRUCTIONS
Call today or tomorrow to follow up with Shey Oliva MD  in 1-5 days. Use an ice pack or bag filled with ice and apply to the injured area 3 - 4 times a day for 15 - 20 minutes each time. If the injury is older than 3 days, then use a heating pad to help relax the muscles in your back. Use ibuprofen or Tylenol (unless prescribed medications that have Tylenol in it) for pain. You can take over the counter Ibuprofen (advil) tablets (4 tablets every 8 hours or 3 tablets every 6 hours or 2 tablets every 4 hours)    Damián' Flexion Exercises     1. Pelvic tilt. Lie on your back with knees bent, feet flat on floor. Flatten the small of your back against the floor, without pushing down with the legs. Hold for 5 to 10 seconds. 2. Single Knee to chest. Lie on your back with knees bent and feet flat on the floor. Slowly pull your right knee toward your shoulder and hold 5 to 10 seconds. Lower the knee and repeat with the other knee. 3. Double knee to chest. Begin as in the previous exercise. After pulling right knee to chest, pull left knee to chest and hold both knees for 5 to 10 seconds. Slowly lower one leg at a time. 4. Partial sit-up. Do the pelvic tilt (exercise 1) and, while holding this position, slowly curl your head and shoulders off the floor. Hold briefly. Return slowly to the starting position. 5. Hamstring stretch. Start in long sitting with toes directed toward the ceiling and knees fully extended. Slowly lower the trunk forward over the legs, keeping knees extended, arms outstretched over the legs, and eyes focus ahead. 6. Hip Flexor stretch. Place one foot in front of the other with the left (front) knee flexed and the right (back) knee held rigidly straight. Flex forward through the trunk until the left knee contacts the axillary fold (arm pit region). Repeat with right leg forward and left leg back. 7. Squat.  Stand with both feet parallel, about shoulder's width apart. Attempting to maintain the trunk as perpendicular as possible to the floor, eyes focused ahead, and feet flat on the floor, the subject slowly lowers his body by flexing his knees    Return to the Emergency Department for inability to move legs, worsening of pain, tingling / loss of sensation, any other care or concern.

## 2022-12-15 NOTE — ED NOTES
Pt observed resting on cot  Awaiting PICC to place IV for CT  Mikhail Wesley EMTp attempted-infiltrated  No concerns voiced     Maria G Cruz RN  12/15/22 [id number]

## 2022-12-18 ENCOUNTER — HOSPITAL ENCOUNTER (EMERGENCY)
Age: 52
Discharge: HOME | End: 2022-12-18
Attending: EMERGENCY MEDICINE

## 2022-12-18 VITALS
OXYGEN SATURATION: 99 % | HEART RATE: 97 BPM | DIASTOLIC BLOOD PRESSURE: 87 MMHG | RESPIRATION RATE: 18 BRPM | TEMPERATURE: 97.1 F | SYSTOLIC BLOOD PRESSURE: 170 MMHG

## 2022-12-18 DIAGNOSIS — M54.32 SCIATICA OF LEFT SIDE: Primary | ICD-10-CM

## 2022-12-18 PROCEDURE — 6360000002 HC RX W HCPCS: Performed by: STUDENT IN AN ORGANIZED HEALTH CARE EDUCATION/TRAINING PROGRAM

## 2022-12-18 PROCEDURE — 96372 THER/PROPH/DIAG INJ SC/IM: CPT

## 2022-12-18 PROCEDURE — 99284 EMERGENCY DEPT VISIT MOD MDM: CPT

## 2022-12-18 RX ORDER — ORPHENADRINE CITRATE 30 MG/ML
60 INJECTION INTRAMUSCULAR; INTRAVENOUS ONCE
Status: COMPLETED | OUTPATIENT
Start: 2022-12-18 | End: 2022-12-18

## 2022-12-18 RX ORDER — KETOROLAC TROMETHAMINE 30 MG/ML
30 INJECTION, SOLUTION INTRAMUSCULAR; INTRAVENOUS ONCE
Status: COMPLETED | OUTPATIENT
Start: 2022-12-18 | End: 2022-12-18

## 2022-12-18 RX ORDER — CYCLOBENZAPRINE HCL 10 MG
10 TABLET ORAL 3 TIMES DAILY PRN
Qty: 20 TABLET | Refills: 0 | Status: SHIPPED | OUTPATIENT
Start: 2022-12-18 | End: 2022-12-28

## 2022-12-18 RX ADMIN — ORPHENADRINE CITRATE 60 MG: 30 INJECTION INTRAMUSCULAR; INTRAVENOUS at 07:22

## 2022-12-18 RX ADMIN — KETOROLAC TROMETHAMINE 30 MG: 30 INJECTION, SOLUTION INTRAMUSCULAR; INTRAVENOUS at 07:22

## 2022-12-18 ASSESSMENT — PAIN DESCRIPTION - PAIN TYPE: TYPE: ACUTE PAIN

## 2022-12-18 ASSESSMENT — PAIN SCALES - GENERAL: PAINLEVEL_OUTOF10: 10

## 2022-12-18 ASSESSMENT — ENCOUNTER SYMPTOMS
ABDOMINAL PAIN: 0
CONSTIPATION: 0
VOMITING: 0
SHORTNESS OF BREATH: 0
BACK PAIN: 1

## 2022-12-18 ASSESSMENT — PAIN DESCRIPTION - LOCATION: LOCATION: BACK;FOOT;LEG

## 2022-12-18 ASSESSMENT — PAIN DESCRIPTION - ORIENTATION: ORIENTATION: LEFT

## 2022-12-18 ASSESSMENT — PAIN DESCRIPTION - DESCRIPTORS: DESCRIPTORS: BURNING;NUMBNESS

## 2022-12-18 ASSESSMENT — PAIN - FUNCTIONAL ASSESSMENT: PAIN_FUNCTIONAL_ASSESSMENT: 0-10

## 2022-12-18 NOTE — ED PROVIDER NOTES
[facility]  ED  Emergency Department Encounter  Emergency Medicine Resident     Pt Name: Werner Quijano  KGW:[id number]  [place] 1970  Date of evaluation: 12/18/22  PCP:  Pooja Jack MD    [address] [place] [address]       Chief Complaint   Patient presents with    Leg Pain    Back Pain     Across back, left leg burning pain, left foot numb     HISTORY OF PRESENT ILLNESS  (Location/Symptom, Timing/Onset, Context/Setting, Quality, Duration, ModifyingFactors, Severity.)      Werner Quijano is a 45 y.o. female with PMH of hypertension, hyperlipidemia, seizure disorder, fibromyalgia who presents for evaluation of left-sided back pain that radiates to the leg. Patient has had sciatica since her pregnancy 15 years ago. Today symptoms are consistent with her typical symptoms but worse in intensity. Review of records shows seen the ED 12/15/2022 at which time she had CTA to evaluate for AAA which showed adnexal cyst but no osseous abnormality. No numbness, fever, saddle anesthesia, bladder/bowel incontinence/retention, history of IV drug use, history of cancer. PAST MEDICAL / SURGICAL / SOCIAL /FAMILY HISTORY      has a past medical history of Asthma, Depression, ESBL (extended spectrum beta-lactamase) producing bacteria infection, Fibromyalgia, Gastroparesis, Headache, Hyperlipidemia, Hypertension, Neuropathy, Osteoarthritis, Seizure ([address] and Tennis elbow. No other pertinent PMH on review with patient/guardian. has a past surgical history that includes Hysterectomy; Breast surgery; Tonsillectomy; Adenoidectomy; sinus surgery; Cosmetic surgery; knee surgery; and Cardiac catheterization (7-). No other pertinent PSH on review with patient/guardian.   Social History     Socioeconomic History    Marital status: Single     Spouse name: Not on file    Number of children: Not on file    Years of education: Not on file    Highest education level: Not on file   Occupational History    Not on file Tobacco Use    Smoking status: Never    Smokeless tobacco: Never   Vaping Use    Vaping Use: Never used   Substance and Sexual Activity    Alcohol use: Yes     Comment: occasionally    Drug use: Yes     Types: Marijuana [place])    Sexual activity: Not on file   Other Topics Concern    Not on file   Social History Narrative    Barrier to care due to finances and no insurance. Social Determinants of Health     Financial Resource Strain: Low Risk     Difficulty of Paying Living Expenses: Not hard at all   Food Insecurity: No Food Insecurity    Worried About Running Out of Food in the Last Year: Never true    Ran Out of Food in the Last Year: Never true   Transportation Needs: Not on file   Physical Activity: Not on file   Stress: Not on file   Social Connections: Not on file   Intimate Partner Violence: Not on file   Housing Stability: Not on file       I counseled the patient against using tobacco products. Family History   Problem Relation Age of Onset    Cancer Mother         breast and bone    Heart Disease Father     Diabetes Sister     High Blood Pressure Sister     Diabetes Brother     High Blood Pressure Brother     Heart Disease Maternal Grandmother     Cancer Maternal Grandmother         breast    Cancer Paternal Grandmother     Heart Disease Paternal Grandfather      No other pertinent FamHx on review with patient/guardian. Allergies:  Latex, Motrin [ibuprofen], Norco [hydrocodone-acetaminophen], Tramadol, Naproxen, and Tylenol [acetaminophen]    Home Medications:  Prior to Admission medications    Medication Sig Start Date End Date Taking?  Authorizing Provider   cyclobenzaprine (FLEXERIL) 10 MG tablet Take 1 tablet by mouth 3 times daily as needed for Muscle spasms 12/18/22 12/28/22 Yes Radha Plasencia DO   predniSONE (DELTASONE) 50 MG tablet Take 1 tablet by mouth daily for 5 days 12/15/22 12/20/22  Patricia Santos MD   melatonin (RA MELATONIN) 3 MG TABS tablet Take 1 tablet by mouth daily 12/15/22   Randall Yang MD   metoprolol tartrate (LOPRESSOR) 25 MG tablet Take 1 tablet by mouth daily 8/19/22   Pooja Jack MD   lidocaine (LIDODERM) 5 % Place 1 patch onto the skin in the morning. 12 hours on, 12 hours off. . 8/3/22   Izabella Rojas MD   atorvastatin (LIPITOR) 40 MG tablet Take 1 tablet by mouth nightly 8/3/22   Izabella Rojas MD   furosemide (LASIX) 20 MG tablet Take 1 tablet by mouth in the morning. 8/3/22   ANNA Maurice - CNP   lamoTRIgine (LAMICTAL) 100 MG tablet Take 1 tablet by mouth in the morning and 1 tablet before bedtime. 7/30/22   Maddie Whipple MD   losartan (COZAAR) 50 mg tablet Take 1 tablet by mouth in the morning and at bedtime 6/10/22 8/3/22  Pooja Jack MD   benzocaine-menthol (CEPACOL SORE THROAT) 15-3.6 MG lozenge Take 1 lozenge by mouth every 2 hours as needed for Sore Throat 9/18/19   Roselia Jamil PA-C   Elastic Bandages & Supports (KNEE BRACE ADJUSTABLE HINGED) MISC 1 Device by Does not apply route as needed (comfort) 12/19/18   Yeimy Denny MD   dicyclomine (BENTYL) 10 MG capsule Take 1 capsule by mouth every 6 hours as needed (cramps) 5/26/18   Francisco Aguilera DO   Misc. Devices (WRIST BRACE) MISC 1 Device by Does not apply route daily One brace to be worn at night and daily as needed for carpal tunnel syndrome. Brace should place the wrist in neutral. 2/20/17   Paulina Irwin DO   EPINEPHrine (EPIPEN) 0.3 MG/0.3ML SOAJ injection Inject 0.3 mg into the muscle as needed Use as directed for allergic reaction    Historical Provider, MD   Elastic Bandages & Supports ([address]) MISC 1 Device by Does not apply route as needed 6/2/15   Edmund Flores MD   indomethacin (INDOCIN) 50 MG capsule Take 1 capsule by mouth 3 times daily (with meals). 7/26/14 8/3/22  Paula Trujillo MD       REVIEW OF SYSTEMS    (2-9 systems for level 4, 10 ormore for level 5)      Review of Systems   Constitutional:  Negative for chills and fever. Eyes:  Negative for visual disturbance. Respiratory:  Negative for shortness of breath. Cardiovascular:  Negative for chest pain. Gastrointestinal:  Negative for abdominal pain, constipation and vomiting. Genitourinary:  Negative for difficulty urinating. Musculoskeletal:  Positive for back pain. Negative for gait problem and neck pain. Skin:  Negative for rash. Allergic/Immunologic: Negative for immunocompromised state. Neurological:  Negative for weakness and numbness. Hematological:  Does not bruise/bleed easily. PHYSICAL EXAM   (up to 7 for level 4, 8 or more for level 5)      INITIAL VITALS:   BP (!) 170/87   Pulse 97   Temp 97.1 °F (36.2 °C) (Oral)   Resp 18   SpO2 99%     Physical Exam  Constitutional:       General: She is not in acute distress. Appearance: Normal appearance. She is not ill-appearing, toxic-appearing or diaphoretic. HENT:      Head: Normocephalic and atraumatic. Right Ear: External ear normal.      Left Ear: External ear normal.   Eyes:      General:         Right eye: No discharge. Left eye: No discharge. Cardiovascular:      Rate and Rhythm: Normal rate and regular rhythm. Pulses: Normal pulses. Heart sounds: No murmur heard. Pulmonary:      Effort: Pulmonary effort is normal. No respiratory distress. Breath sounds: Normal breath sounds. No wheezing, rhonchi or rales. Abdominal:      Palpations: Abdomen is soft. Tenderness: There is no abdominal tenderness. Musculoskeletal:      Comments: Mild midline lumbar tenderness however tenderness is worst over left SI joint. Positive straight leg raise on the left. Skin:     Capillary Refill: Capillary refill takes less than 2 seconds. Neurological:      General: No focal deficit present. Mental Status: She is alert. Comments: 5/5 strength in BLE. Sensation intact. Patellar reflexes 2+. Toes downgoing with Babinski. Position sense of great toe intact. DIFFERENTIAL  DIAGNOSIS     PLAN (LABS / IMAGING / EKG):  No orders of the defined types were placed in this encounter. MEDICATIONS ORDERED:  Orders Placed This Encounter   Medications    ketorolac (TORADOL) injection 30 mg    orphenadrine (NORFLEX) injection 60 mg    cyclobenzaprine (FLEXERIL) 10 MG tablet     Sig: Take 1 tablet by mouth 3 times daily as needed for Muscle spasms     Dispense:  20 tablet     Refill:  0       DIAGNOSTIC RESULTS / EMERGENCY DEPARTMENT COURSE / MDM     LABS:  No results found for this visit on 12/18/22. IMPRESSION/MDM/ED COURSE:  45 y.o. female presented with flareup of chronic back pain. Patient hypertensive at 170/87. Vitals otherwise WNL. On exam patient peers uncomfortable but nontoxic. Heart RRR, lungs clear. Mild midline lumbar tenderness however tenderness is worst over left SI joint. Positive straight leg raise on the left. No focal neurologic deficits or red flag symptoms. Character of pain is similar to chronic pain without recent injury. Do not feel imaging is necessary at this time. Asymptomatic from hypertension standpoint without headache, chest pain, shortness of breath, likely secondary to pain. We will treat with Norflex and Toradol. Patient does have NSAID allergy listed but is able to tolerate Toradol. Patient with improvement in symptoms following medication and requesting discharge. Will discharge with Flexeril. Recommend Voltaren gel. Follow-up with PCP. Pain clinic referral provided. I discussed signs and symptoms that would require reevaluation in the ED. The patient expressed understanding and agreement with plan. All questions answered. RADIOLOGY:  No orders to display       EKG  None    All EKG's are interpreted by the Emergency Department Physician who either signs or Co-signs this chart in the absence of a cardiologist.    PROCEDURES:  None    CONSULTS:  None    FINAL IMPRESSION      1.  Sciatica of left side DISPOSITION / PLAN     DISPOSITION Decision To Discharge 12/18/2022 07:43:42 AM      PATIENT REFERREDTO:  Neftali Jade MD  [address]  [phone]    Schedule an appointment as soon as possible for a visit       DISCHARGE MEDICATIONS:  Discharge Medication List as of 12/18/2022  7:48 AM        START taking these medications    Details   cyclobenzaprine (FLEXERIL) 10 MG tablet Take 1 tablet by mouth 3 times daily as needed for Muscle spasms, Disp-20 tablet, R-0Print             Sabino Bond DO  PGY 3  Resident Physician Emergency Medicine  12/18/22 3:34 PM    (Please note that portions of this note were completed with a voice recognition program.Efforts were made to edit the dictations but occasionally words are mis-transcribed.)        Pietro Tripathi DO  Resident  12/18/22 3991

## 2022-12-18 NOTE — DISCHARGE INSTRUCTIONS
Take Flexeril as needed for muscle spasm, this is a muscle relaxer and will make you sleepy so do not drive while taking this medication. You may also want to try Voltaren gel which is available over-the-counter. Please test this on a small area to make sure that you do not have an allergic reaction prior to using on your entire back. Please follow-up with your primary care provider and schedule an appointment with pain management as soon as possible. Return to the ED for worsening pain, weakness, incontinence, or other new/concerning symptoms.

## 2022-12-19 NOTE — ED PROVIDER NOTES
[facility]   Emergency Department  Faculty Attestation       I performed a history and physical examination of the patient and discussed management with the resident. I reviewed the residents note and agree with the documented findings including all diagnostic interpretations and plan of care. Any areas of disagreement are noted on the chart. I was personally present for the key portions of any procedures. I have documented in the chart those procedures where I was not present during the key portions. I have reviewed the emergency nurses triage note. I agree with the chief complaint, past medical history, past surgical history, allergies, medications, social and family history as documented unless otherwise noted below. Documentation of the HPI, Physical Exam and Medical Decision Making performed by scribannmarie is based on my personal performance of the HPI, PE and MDM. For Physician Assistant/ Nurse Practitioner cases/documentation I have personally evaluated this patient and have completed at least one if not all key elements of the E/M (history, physical exam, and MDM). Additional findings are as noted. Pertinent Comments     Primary Care Physician: Kimmy Almeida MD      ED Triage Vitals   BP Temp Temp Source Heart Rate Resp SpO2 Height Weight   12/18/22 0548 12/18/22 0548 12/18/22 0548 12/18/22 0548 12/18/22 0605 12/18/22 0552 -- --   (!) 170/87 97.1 °F (36.2 °C) Oral 97 18 99 %            This is a 45 y.o. female who presents to the Emergency Department lower back pain that radiates down the left. Leg. Has had a h/o recurrent intermittent sciatica. Started having pain a few days prior to visit. Denies nay numbness or weakness of the extremities. No bowel or baldder incontinence. No falls and no fevers. Per chart review in coordination with patient's history - patient was evaluated on 12/15 for same symptoms at which time she had lab evaluation and CTA to rule out AAA.   She was given decadron in the ED and snet home w/ prednsione. CTA showed no aortic pathology but did have right adnexal cyst.  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 [facility] given [facility]. Since that visit the pain has been persistent and still rpedominately left but no spasms across both sides. On exam she is awake and alert and talking in full sentences. Interactive. NC/AT. Heart sounds regular and 2+ pulses PT/DP. Lungs clear to auscultaiton. Abdomen is soft and nontender Patinet is able to sit forward on  her own, but slowed due to to discmfort. No midline back tenderness and Normal strength and sesnation in bilateral lower extremities. However, there is significant left lower back tenderness in the lower lmbar to SI region with no obvious injuries, bruising, or swelling. There is spasm. MSK back pain  that has worsened since visit 3 days prior. Had CTA on prior visit. Currently has no clinical s/s of cauda equna and therefore does not need any imaging at this time. We did discuss the signs and symptosm to monitor for. Given toradol and norflex in the ED. We discussed trialin flexeril at home as it does not make some people drowsy. Recommended taking it as soon as she gets home from work and not to add melatonin or any other sleeping agent for the first 1-2 days to make sure to determine effects  I did discuss topical Ketoralac (Voltaren Gel) with the patient and feel that it may be beneficial given patient's current symptoms. We discussed that it is now readily available over the counter and is also cheaper as an OTC instead of an Rx. Patient is comfortable with obtaiining this OTC. Will include recommendation in discharge paperwork. [facility] for d/c with outpatient follow-up.  This plan was made using physician - patient shared decision making and patient agreeable with current paln.            Critical Care: None     Gurdeep Francois MD  Attending Emergency Physician         Gurdeep Francois MD  12/19/22 0673

## 2023-02-21 NOTE — TELEPHONE ENCOUNTER
Pharmacy requesting refill of lamotrigine 100 mg.       Medication active on med list yes      Date of last Rx: 7/30/2022 with 2 refills          verified by CHERIE SEE      Date of last appointment 6/1/2022    Next Visit Date:  Visit date not found

## 2023-02-23 RX ORDER — LAMOTRIGINE 100 MG/1
100 TABLET ORAL 2 TIMES DAILY
Qty: 60 TABLET | Refills: 2 | Status: SHIPPED | OUTPATIENT
Start: 2023-02-23

## 2023-06-10 ENCOUNTER — HOSPITAL ENCOUNTER (EMERGENCY)
Age: 53
Discharge: HOME | End: 2023-06-10
Attending: EMERGENCY MEDICINE

## 2023-06-10 VITALS
BODY MASS INDEX: 40.01 KG/M2 | SYSTOLIC BLOOD PRESSURE: 145 MMHG | HEIGHT: 68 IN | DIASTOLIC BLOOD PRESSURE: 71 MMHG | WEIGHT: 264 LBS | RESPIRATION RATE: 17 BRPM | TEMPERATURE: 98.3 F | HEART RATE: 96 BPM | OXYGEN SATURATION: 97 %

## 2023-06-10 DIAGNOSIS — H66.90 ACUTE OTITIS MEDIA, UNSPECIFIED OTITIS MEDIA TYPE: ICD-10-CM

## 2023-06-10 DIAGNOSIS — M62.838 NECK MUSCLE SPASM: ICD-10-CM

## 2023-06-10 DIAGNOSIS — M62.838 TRAPEZIUS MUSCLE SPASM: Primary | ICD-10-CM

## 2023-06-10 PROCEDURE — 6360000002 HC RX W HCPCS: Performed by: STUDENT IN AN ORGANIZED HEALTH CARE EDUCATION/TRAINING PROGRAM

## 2023-06-10 PROCEDURE — 6370000000 HC RX 637 (ALT 250 FOR IP): Performed by: STUDENT IN AN ORGANIZED HEALTH CARE EDUCATION/TRAINING PROGRAM

## 2023-06-10 RX ORDER — CYCLOBENZAPRINE HCL 10 MG
10 TABLET ORAL 2 TIMES DAILY PRN
Qty: 10 TABLET | Refills: 0 | Status: SHIPPED | OUTPATIENT
Start: 2023-06-10 | End: 2023-06-25

## 2023-06-10 RX ORDER — AZITHROMYCIN 250 MG/1
500 TABLET, FILM COATED ORAL ONCE
Status: COMPLETED | OUTPATIENT
Start: 2023-06-10 | End: 2023-06-10

## 2023-06-10 RX ORDER — CETIRIZINE HYDROCHLORIDE 10 MG/1
10 TABLET ORAL DAILY
Qty: 30 TABLET | Refills: 0 | Status: SHIPPED | OUTPATIENT
Start: 2023-06-10

## 2023-06-10 RX ORDER — AZITHROMYCIN 500 MG/1
500 TABLET, FILM COATED ORAL DAILY
Qty: 3 TABLET | Refills: 0 | Status: SHIPPED | OUTPATIENT
Start: 2023-06-10 | End: 2023-06-13

## 2023-06-10 RX ORDER — CETIRIZINE HYDROCHLORIDE 10 MG/1
10 TABLET ORAL DAILY
Status: DISCONTINUED | OUTPATIENT
Start: 2023-06-10 | End: 2023-06-10 | Stop reason: HOSPADM

## 2023-06-10 RX ORDER — FLUCONAZOLE 150 MG/1
150 TABLET ORAL DAILY
Qty: 2 TABLET | Refills: 0 | Status: SHIPPED | OUTPATIENT
Start: 2023-06-10 | End: 2023-06-12

## 2023-06-10 RX ORDER — ORPHENADRINE CITRATE 30 MG/ML
60 INJECTION INTRAMUSCULAR; INTRAVENOUS ONCE
Status: COMPLETED | OUTPATIENT
Start: 2023-06-10 | End: 2023-06-10

## 2023-06-10 RX ADMIN — AZITHROMYCIN MONOHYDRATE 500 MG: 250 TABLET ORAL at 16:11

## 2023-06-10 RX ADMIN — CETIRIZINE HYDROCHLORIDE 10 MG: 10 TABLET, FILM COATED ORAL at 16:03

## 2023-06-10 RX ADMIN — ORPHENADRINE CITRATE 60 MG: 60 INJECTION INTRAMUSCULAR; INTRAVENOUS at 16:03

## 2023-06-10 ASSESSMENT — LIFESTYLE VARIABLES
HOW OFTEN DO YOU HAVE A DRINK CONTAINING ALCOHOL: NEVER
HOW MANY STANDARD DRINKS CONTAINING ALCOHOL DO YOU HAVE ON A TYPICAL DAY: PATIENT DOES NOT DRINK

## 2023-06-10 ASSESSMENT — ENCOUNTER SYMPTOMS: SHORTNESS OF BREATH: 0

## 2023-06-10 ASSESSMENT — PAIN DESCRIPTION - LOCATION
LOCATION: EAR;NECK
LOCATION: NECK;EAR

## 2023-06-10 ASSESSMENT — PAIN SCALES - GENERAL
PAINLEVEL_OUTOF10: 10
PAINLEVEL_OUTOF10: 10

## 2023-06-10 ASSESSMENT — PAIN - FUNCTIONAL ASSESSMENT: PAIN_FUNCTIONAL_ASSESSMENT: 0-10

## 2023-06-10 ASSESSMENT — PAIN DESCRIPTION - ORIENTATION: ORIENTATION: RIGHT

## 2023-06-10 NOTE — ED PROVIDER NOTES
[address] [facility]  Emergency Department Encounter  Emergency Medicine Resident     Pt Sally Hylton  MRN: [id number]  [place] 1970  Date of evaluation: 6/10/23  PCP:  Shey Oliva MD  Note Started: 3:34 PM EDT    CHIEF COMPLAINT       Chief Complaint   Patient presents with    Otalgia     right    Neck Pain     HISTORY OF PRESENT ILLNESS  (Location/Symptom, Timing/Onset, Context/Setting, Quality, Duration, Modifying Factors, Severity.)      Reji Guan is a 52 y.o. female who presents with right shoulder pain and right-sided neck pain which has been ongoing for approximately the last week. Patient states that she woke up with the pain 1 day and has been persistent aching pain since then. 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 She has not tried any over-the-counter pain medications because she says that Tylenol and Motrin make her feel sick. She is also complaining of fullness in her right ear. She describes history of constant ear infections as well as TM tubes placed several years ago. 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 has a past surgical history that includes Hysterectomy; Breast surgery; Tonsillectomy; Adenoidectomy; sinus surgery; Cosmetic surgery; knee surgery; and Cardiac catheterization (7-).     Social History     Socioeconomic History    Marital status: Single     Spouse name: Not on file    Number of children: Not on file    Years of education: Not on file    Highest education level: Not on file   Occupational History    Not on file   Tobacco Use    Smoking status: Never    Smokeless tobacco: Never   Vaping Use
evaluated and examined the patient in conjunction with the APC and agree with the assessment, treatment plan, and disposition of the patient as recorded by the APC. Additional findings are as noted.     Jorge Lowe DO  Attending Emergency  Physician              Jorge Lowe DO  06/10/23 [address] [place] [address],[address]

## 2023-06-10 NOTE — DISCHARGE INSTRUCTIONS
You were given an antibiotic for an ear infection. Make sure you get the prescription filled and take the antibiotics until finished even if you begin to feel better. Drink plenty of water while taking the antibiotics. Avoid drinking alcohol or drinks that have caffeine in it while taking antibiotics. Use the prescribed fluconazole/diflucan the day you finish the antibiotic to prevent/treat yeast infection. If you begin to have symptoms of a yeast infection 48 hours after this, you may take the second dose of fluconazole/diflucan. You will need to follow up with ENT. Appointment can be arranged with an adult ENT provider. You can contact EITHER of the practices listed here to schedule a follow up. Please contact the office at the number listed below to coordinate follow up. OPTION 1:  Ear, Nose, and Throat surgeons at [facility]     [facility]:  Dr. Jake Clancy and Dr. Erica Hayes [address] [facility]  [phone]     OPTION 2:  [facility]  [address]  [place], [address]  Appointment scheduling:  [phone]    You may take the prescribed muscle relaxer for pain control and to help relax the muscles in your shoulder. If symptoms persist or worsen you should be re-evaluated by your primary care physician. PLEASE RETURN TO THE EMERGENCY DEPARTMENT IMMEDIATELY for worsening symptoms of increasing pain, shortness of breath, feeling of your heart fluttering or racing, swelling to your feet, unable to lay flat, or if you develop any concerning symptoms such as: high fever , chills, persistent nausea and/or vomiting, loss of consciousness, numbness, weakness or tingling in the arms or legs or change in color of the extremities, changes in mental status, persistent headache, blurry vision, loss of bladder / bowel control, unable to follow up with your physician, or other any other care or concern.

## 2023-06-10 NOTE — ED NOTES
Mode of arrival (squad #, walk in, police, etc) : Walk in         Chief complaint(s): Right ear pain / neck muscle spasm        Arrival Note (brief scenario, treatment PTA, etc). : Pt to the ED with the above listed symptoms since yesterday        C= \"Have you ever felt that you should Cut down on your drinking? \"  No  A= \"Have people Annoyed you by criticizing your drinking? \"  No  G= \"Have you ever felt bad or Guilty about your drinking? \"  No  E= \"Have you ever had a drink as an Eye-opener first thing in the morning to steady your nerves or to help a hangover? \"  No      Deferred []      Reason for deferring: N/A    *If yes to two or more: probable alcohol abuse. Elizabeth Mendoza RN  06/10/23 154

## 2023-06-12 NOTE — FLOWSHEET NOTE
Call received from patient stating that she was seen on 6/10/23 in ER with four medications prescribed with patient only receiving two of the four medications from [address] [place] [address], missing her allergy (Cetirizine) and antibiotic (Azithromycin). Author attempted to contact [address] [place] [address] with message left for request for return call to confirm discharge orders by Dr. Anna Woods.

## 2023-06-29 ENCOUNTER — OFFICE VISIT (OUTPATIENT)
Dept: NEUROLOGY | Age: 53
End: 2023-06-29

## 2023-06-29 VITALS
BODY MASS INDEX: 39.86 KG/M2 | SYSTOLIC BLOOD PRESSURE: 161 MMHG | WEIGHT: 263 LBS | HEART RATE: 84 BPM | DIASTOLIC BLOOD PRESSURE: 89 MMHG | HEIGHT: 68 IN

## 2023-06-29 DIAGNOSIS — M54.12 CERVICAL RADICULOPATHY: Primary | ICD-10-CM

## 2023-06-29 PROCEDURE — 3074F SYST BP LT 130 MM HG: CPT | Performed by: PSYCHIATRY & NEUROLOGY

## 2023-06-29 PROCEDURE — 99214 OFFICE O/P EST MOD 30 MIN: CPT | Performed by: PSYCHIATRY & NEUROLOGY

## 2023-06-29 PROCEDURE — 3078F DIAST BP <80 MM HG: CPT | Performed by: PSYCHIATRY & NEUROLOGY

## 2023-06-29 RX ORDER — PREDNISONE 20 MG/1
TABLET ORAL
Qty: 18 TABLET | Refills: 0 | Status: SHIPPED | OUTPATIENT
Start: 2023-06-29

## 2023-07-20 ENCOUNTER — TELEPHONE (OUTPATIENT)
Dept: NEUROLOGY | Age: 53
End: 2023-07-20

## 2023-07-20 NOTE — TELEPHONE ENCOUNTER
07 20 2023 I called the patient times 2 (07 10 2023 and 07 17 2023 at  [phone])  to schedule October of 2023 follow up appointment with Dr. Deuce Freeman both times, no response. I mailed the patient a letter asking them to call the office back to schedule this appointment.   KS

## 2023-07-21 NOTE — TELEPHONE ENCOUNTER
Pharmacy requesting refill of Lamotrigine 100 mg.       Medication active on med list yes      Date of last Rx: 02/23/23 with 2 refills          verified by CHERIE ANTOINE      Date of last appointment 06/29/23    Next Visit Date:  Visit date not found

## 2023-07-22 RX ORDER — LAMOTRIGINE 100 MG/1
TABLET ORAL
Qty: 60 TABLET | Refills: 1 | Status: SHIPPED | OUTPATIENT
Start: 2023-07-22

## 2023-07-30 ENCOUNTER — HOSPITAL ENCOUNTER (INPATIENT)
Age: 53
LOS: 2 days | Discharge: HOME | DRG: 189 | End: 2023-08-01
Attending: EMERGENCY MEDICINE | Admitting: STUDENT IN AN ORGANIZED HEALTH CARE EDUCATION/TRAINING PROGRAM

## 2023-07-30 ENCOUNTER — APPOINTMENT (OUTPATIENT)
Dept: GENERAL RADIOLOGY | Age: 53
DRG: 189 | End: 2023-07-30

## 2023-07-30 ENCOUNTER — APPOINTMENT (OUTPATIENT)
Dept: VASCULAR LAB | Age: 53
DRG: 189 | End: 2023-07-30

## 2023-07-30 ENCOUNTER — APPOINTMENT (OUTPATIENT)
Dept: CT IMAGING | Age: 53
DRG: 189 | End: 2023-07-30

## 2023-07-30 DIAGNOSIS — J81.0 ACUTE PULMONARY EDEMA (HCC): ICD-10-CM

## 2023-07-30 DIAGNOSIS — M79.89 RIGHT LEG SWELLING: ICD-10-CM

## 2023-07-30 DIAGNOSIS — I50.9 ACUTE ON CHRONIC CONGESTIVE HEART FAILURE, UNSPECIFIED HEART FAILURE TYPE (HCC): Primary | ICD-10-CM

## 2023-07-30 PROBLEM — J81.1 PULMONARY EDEMA WITHOUT HEART FAILURE: Status: ACTIVE | Noted: 2023-07-30

## 2023-07-30 LAB
ANION GAP SERPL CALCULATED.3IONS-SCNC: 12 MMOL/L (ref 9–17)
BASOPHILS # BLD: <0.03 K/UL (ref 0–0.2)
BASOPHILS NFR BLD: 0 % (ref 0–2)
BNP SERPL-MCNC: 369 PG/ML
BODY TEMPERATURE: 37
BUN SERPL-MCNC: 13 MG/DL (ref 6–20)
CALCIUM SERPL-MCNC: 9.2 MG/DL (ref 8.6–10.4)
CHLORIDE SERPL-SCNC: 104 MMOL/L (ref 98–107)
CO2 SERPL-SCNC: 23 MMOL/L (ref 20–31)
COHGB MFR BLD: 3.5 % (ref 0–5)
CREAT SERPL-MCNC: 0.6 MG/DL (ref 0.5–0.9)
D DIMER PPP FEU-MCNC: 2.13 UG/ML FEU (ref 0–0.57)
EOSINOPHIL # BLD: 0.21 K/UL (ref 0–0.44)
EOSINOPHILS RELATIVE PERCENT: 3 % (ref 1–4)
ERYTHROCYTE [DISTWIDTH] IN BLOOD BY AUTOMATED COUNT: 16.2 % (ref 11.8–14.4)
FIO2 ON VENT: NORMAL %
GFR SERPL CREATININE-BSD FRML MDRD: >60 ML/MIN/1.73M2
GLUCOSE SERPL-MCNC: 100 MG/DL (ref 70–99)
HCO3 VENOUS: 25.3 MMOL/L (ref 24–30)
HCT VFR BLD AUTO: 37.9 % (ref 36.3–47.1)
HGB BLD-MCNC: 11.5 G/DL (ref 11.9–15.1)
IMM GRANULOCYTES # BLD AUTO: <0.03 K/UL (ref 0–0.3)
IMM GRANULOCYTES NFR BLD: 0 %
LYMPHOCYTES NFR BLD: 2.8 K/UL (ref 1.1–3.7)
LYMPHOCYTES RELATIVE PERCENT: 37 % (ref 24–43)
MAGNESIUM SERPL-MCNC: 1.8 MG/DL (ref 1.6–2.6)
MCH RBC QN AUTO: 22.3 PG (ref 25.2–33.5)
MCHC RBC AUTO-ENTMCNC: 30.3 G/DL (ref 28.4–34.8)
MCV RBC AUTO: 73.4 FL (ref 82.6–102.9)
MONOCYTES NFR BLD: 0.41 K/UL (ref 0.1–1.2)
MONOCYTES NFR BLD: 5 % (ref 3–12)
NEUTROPHILS NFR BLD: 55 % (ref 36–65)
NEUTS SEG NFR BLD: 4.11 K/UL (ref 1.5–8.1)
NRBC BLD-RTO: 0 PER 100 WBC
O2 SAT, VEN: 68 % (ref 60–85)
PCO2, VEN: 46.3 MM HG (ref 39–55)
PH VENOUS: 7.36 (ref 7.32–7.42)
PLATELET # BLD AUTO: 237 K/UL (ref 138–453)
PMV BLD AUTO: 10.7 FL (ref 8.1–13.5)
PO2, VEN: 38.8 MM HG (ref 30–50)
POSITIVE BASE EXCESS, VEN: 0.1 MMOL/L (ref 0–2)
POTASSIUM SERPL-SCNC: 3.5 MMOL/L (ref 3.7–5.3)
PROCALCITONIN SERPL-MCNC: 0.02 NG/ML
RBC # BLD AUTO: 5.16 M/UL (ref 3.95–5.11)
RBC # BLD: ABNORMAL 10*6/UL
SODIUM SERPL-SCNC: 139 MMOL/L (ref 135–144)
TROPONIN I SERPL HS-MCNC: 6 NG/L (ref 0–14)
TROPONIN I SERPL HS-MCNC: 8 NG/L (ref 0–14)
WBC OTHER # BLD: 7.6 K/UL (ref 3.5–11.3)

## 2023-07-30 PROCEDURE — 99285 EMERGENCY DEPT VISIT HI MDM: CPT

## 2023-07-30 PROCEDURE — 96376 TX/PRO/DX INJ SAME DRUG ADON: CPT

## 2023-07-30 PROCEDURE — 84484 ASSAY OF TROPONIN QUANT: CPT

## 2023-07-30 PROCEDURE — 6360000002 HC RX W HCPCS

## 2023-07-30 PROCEDURE — 99222 1ST HOSP IP/OBS MODERATE 55: CPT | Performed by: STUDENT IN AN ORGANIZED HEALTH CARE EDUCATION/TRAINING PROGRAM

## 2023-07-30 PROCEDURE — 82803 BLOOD GASES ANY COMBINATION: CPT

## 2023-07-30 PROCEDURE — 71045 X-RAY EXAM CHEST 1 VIEW: CPT

## 2023-07-30 PROCEDURE — 83605 ASSAY OF LACTIC ACID: CPT

## 2023-07-30 PROCEDURE — 6360000002 HC RX W HCPCS: Performed by: STUDENT IN AN ORGANIZED HEALTH CARE EDUCATION/TRAINING PROGRAM

## 2023-07-30 PROCEDURE — 85027 COMPLETE CBC AUTOMATED: CPT

## 2023-07-30 PROCEDURE — 82805 BLOOD GASES W/O2 SATURATION: CPT

## 2023-07-30 PROCEDURE — 36415 COLL VENOUS BLD VENIPUNCTURE: CPT

## 2023-07-30 PROCEDURE — 2580000003 HC RX 258

## 2023-07-30 PROCEDURE — 85014 HEMATOCRIT: CPT

## 2023-07-30 PROCEDURE — 2000000000 HC ICU R&B

## 2023-07-30 PROCEDURE — 80048 BASIC METABOLIC PNL TOTAL CA: CPT

## 2023-07-30 PROCEDURE — 85379 FIBRIN DEGRADATION QUANT: CPT

## 2023-07-30 PROCEDURE — 71260 CT THORAX DX C+: CPT

## 2023-07-30 PROCEDURE — 6360000004 HC RX CONTRAST MEDICATION: Performed by: STUDENT IN AN ORGANIZED HEALTH CARE EDUCATION/TRAINING PROGRAM

## 2023-07-30 PROCEDURE — 6370000000 HC RX 637 (ALT 250 FOR IP)

## 2023-07-30 PROCEDURE — 83880 ASSAY OF NATRIURETIC PEPTIDE: CPT

## 2023-07-30 PROCEDURE — 84520 ASSAY OF UREA NITROGEN: CPT

## 2023-07-30 PROCEDURE — 84145 PROCALCITONIN (PCT): CPT

## 2023-07-30 PROCEDURE — 93971 EXTREMITY STUDY: CPT

## 2023-07-30 PROCEDURE — 82947 ASSAY GLUCOSE BLOOD QUANT: CPT

## 2023-07-30 PROCEDURE — 96365 THER/PROPH/DIAG IV INF INIT: CPT

## 2023-07-30 PROCEDURE — 80051 ELECTROLYTE PANEL: CPT

## 2023-07-30 PROCEDURE — 2700000000 HC OXYGEN THERAPY PER DAY

## 2023-07-30 PROCEDURE — 94761 N-INVAS EAR/PLS OXIMETRY MLT: CPT

## 2023-07-30 PROCEDURE — 83735 ASSAY OF MAGNESIUM: CPT

## 2023-07-30 PROCEDURE — 82565 ASSAY OF CREATININE: CPT

## 2023-07-30 RX ORDER — POLYETHYLENE GLYCOL 3350 17 G/17G
17 POWDER, FOR SOLUTION ORAL DAILY PRN
Status: DISCONTINUED | OUTPATIENT
Start: 2023-07-30 | End: 2023-08-01 | Stop reason: HOSPADM

## 2023-07-30 RX ORDER — POTASSIUM CHLORIDE 20 MEQ/1
40 TABLET, EXTENDED RELEASE ORAL PRN
Status: DISCONTINUED | OUTPATIENT
Start: 2023-07-30 | End: 2023-08-01 | Stop reason: HOSPADM

## 2023-07-30 RX ORDER — FUROSEMIDE 10 MG/ML
20 INJECTION INTRAMUSCULAR; INTRAVENOUS DAILY
Status: DISCONTINUED | OUTPATIENT
Start: 2023-07-30 | End: 2023-07-31

## 2023-07-30 RX ORDER — ONDANSETRON 2 MG/ML
4 INJECTION INTRAMUSCULAR; INTRAVENOUS EVERY 6 HOURS PRN
Status: DISCONTINUED | OUTPATIENT
Start: 2023-07-30 | End: 2023-08-01 | Stop reason: HOSPADM

## 2023-07-30 RX ORDER — NITROGLYCERIN 20 MG/100ML
5-200 INJECTION INTRAVENOUS CONTINUOUS
Status: DISCONTINUED | OUTPATIENT
Start: 2023-07-30 | End: 2023-07-31

## 2023-07-30 RX ORDER — KETOROLAC TROMETHAMINE 15 MG/ML
15 INJECTION, SOLUTION INTRAMUSCULAR; INTRAVENOUS ONCE
Status: COMPLETED | OUTPATIENT
Start: 2023-07-30 | End: 2023-07-30

## 2023-07-30 RX ORDER — POTASSIUM CHLORIDE 7.45 MG/ML
10 INJECTION INTRAVENOUS PRN
Status: DISCONTINUED | OUTPATIENT
Start: 2023-07-30 | End: 2023-08-01 | Stop reason: HOSPADM

## 2023-07-30 RX ORDER — ONDANSETRON 2 MG/ML
4 INJECTION INTRAMUSCULAR; INTRAVENOUS ONCE
Status: COMPLETED | OUTPATIENT
Start: 2023-07-30 | End: 2023-07-30

## 2023-07-30 RX ORDER — SODIUM CHLORIDE 0.9 % (FLUSH) 0.9 %
5-40 SYRINGE (ML) INJECTION PRN
Status: DISCONTINUED | OUTPATIENT
Start: 2023-07-30 | End: 2023-08-01 | Stop reason: HOSPADM

## 2023-07-30 RX ORDER — NITROGLYCERIN 20 MG/100ML
5-200 INJECTION INTRAVENOUS CONTINUOUS
Status: DISCONTINUED | OUTPATIENT
Start: 2023-07-30 | End: 2023-07-30

## 2023-07-30 RX ORDER — ACETAMINOPHEN 650 MG/1
650 SUPPOSITORY RECTAL EVERY 6 HOURS PRN
Status: DISCONTINUED | OUTPATIENT
Start: 2023-07-30 | End: 2023-08-01 | Stop reason: HOSPADM

## 2023-07-30 RX ORDER — ENOXAPARIN SODIUM 100 MG/ML
30 INJECTION SUBCUTANEOUS 2 TIMES DAILY
Status: DISCONTINUED | OUTPATIENT
Start: 2023-07-30 | End: 2023-08-01 | Stop reason: HOSPADM

## 2023-07-30 RX ORDER — SODIUM CHLORIDE 0.9 % (FLUSH) 0.9 %
5-40 SYRINGE (ML) INJECTION EVERY 12 HOURS SCHEDULED
Status: DISCONTINUED | OUTPATIENT
Start: 2023-07-30 | End: 2023-08-01 | Stop reason: HOSPADM

## 2023-07-30 RX ORDER — SODIUM CHLORIDE 9 MG/ML
INJECTION, SOLUTION INTRAVENOUS PRN
Status: DISCONTINUED | OUTPATIENT
Start: 2023-07-30 | End: 2023-08-01 | Stop reason: HOSPADM

## 2023-07-30 RX ORDER — ACETAMINOPHEN 325 MG/1
650 TABLET ORAL EVERY 6 HOURS PRN
Status: DISCONTINUED | OUTPATIENT
Start: 2023-07-30 | End: 2023-08-01 | Stop reason: HOSPADM

## 2023-07-30 RX ORDER — ONDANSETRON 4 MG/1
4 TABLET, ORALLY DISINTEGRATING ORAL EVERY 8 HOURS PRN
Status: DISCONTINUED | OUTPATIENT
Start: 2023-07-30 | End: 2023-08-01 | Stop reason: HOSPADM

## 2023-07-30 RX ORDER — MAGNESIUM SULFATE IN WATER 40 MG/ML
2000 INJECTION, SOLUTION INTRAVENOUS PRN
Status: DISCONTINUED | OUTPATIENT
Start: 2023-07-30 | End: 2023-08-01 | Stop reason: HOSPADM

## 2023-07-30 RX ORDER — LAMOTRIGINE 100 MG/1
100 TABLET ORAL 2 TIMES DAILY
Status: DISCONTINUED | OUTPATIENT
Start: 2023-07-30 | End: 2023-08-01 | Stop reason: HOSPADM

## 2023-07-30 RX ADMIN — SODIUM CHLORIDE, PRESERVATIVE FREE 10 ML: 5 INJECTION INTRAVENOUS at 21:49

## 2023-07-30 RX ADMIN — KETOROLAC TROMETHAMINE 15 MG: 15 INJECTION, SOLUTION INTRAMUSCULAR; INTRAVENOUS at 15:46

## 2023-07-30 RX ADMIN — ONDANSETRON 4 MG: 4 TABLET, ORALLY DISINTEGRATING ORAL at 21:49

## 2023-07-30 RX ADMIN — LAMOTRIGINE 100 MG: 100 TABLET ORAL at 22:50

## 2023-07-30 RX ADMIN — FUROSEMIDE 20 MG: 10 INJECTION, SOLUTION INTRAMUSCULAR; INTRAVENOUS at 22:51

## 2023-07-30 RX ADMIN — IOPAMIDOL 75 ML: 755 INJECTION, SOLUTION INTRAVENOUS at 06:52

## 2023-07-30 RX ADMIN — ONDANSETRON 4 MG: 2 INJECTION INTRAMUSCULAR; INTRAVENOUS at 10:24

## 2023-07-30 RX ADMIN — ENOXAPARIN SODIUM 30 MG: 30 INJECTION SUBCUTANEOUS at 21:49

## 2023-07-30 RX ADMIN — NITROGLYCERIN 50 MCG/MIN: 20 INJECTION INTRAVENOUS at 06:19

## 2023-07-30 RX ADMIN — NITROGLYCERIN 20 MCG/MIN: 20 INJECTION INTRAVENOUS at 19:51

## 2023-07-30 ASSESSMENT — ENCOUNTER SYMPTOMS
COUGH: 0
ABDOMINAL PAIN: 0
SHORTNESS OF BREATH: 1
VOMITING: 0
RHINORRHEA: 1
NAUSEA: 0
COLOR CHANGE: 0
CHEST TIGHTNESS: 0

## 2023-07-30 ASSESSMENT — PAIN DESCRIPTION - LOCATION
LOCATION: HEAD

## 2023-07-30 ASSESSMENT — PAIN SCALES - GENERAL
PAINLEVEL_OUTOF10: 3
PAINLEVEL_OUTOF10: 3
PAINLEVEL_OUTOF10: 7

## 2023-07-30 ASSESSMENT — PAIN DESCRIPTION - DESCRIPTORS
DESCRIPTORS: ACHING
DESCRIPTORS: ACHING

## 2023-07-30 ASSESSMENT — HEART SCORE: ECG: 0

## 2023-07-30 ASSESSMENT — PAIN - FUNCTIONAL ASSESSMENT: PAIN_FUNCTIONAL_ASSESSMENT: 0-10

## 2023-07-30 NOTE — ED NOTES
Dr. Vickie Chiu notified of nitro gtt and continued htn, okay with nitro staying at current rate      Corrine Wall RN  07/30/23 9549

## 2023-07-30 NOTE — ED NOTES
Patient to ED complaining of SOB, Bilateral swelling. Patient states she was going to the  PTA when she suddenly feels out of breath. Patient has history of asthma but not taking any inhaler at this time and says \"it's expensive to get inhaler when you don't have any insurance\" Patient also verbalizes this happens to her same date from last year same condition. On full cardiac monitor, Call light within reach. Will continue plan of care.      Mai Castrejon RN  07/30/23 9896

## 2023-07-30 NOTE — ED PROVIDER NOTES
[address] HANDOFF       Handoff taken on the following patient from prior Attending Physician:  Pt Name: Amy Kenney  PCP:  Tye Woods MD    Attestation  I was available and discussed any additional care issues that arose and coordinated the management plans with the resident(s) caring for the patient during my duty period. Any areas of disagreement with resident's documentation of care or procedures are noted on the chart. I was personally present for the key portions of any/all procedures during my duty period. I have documented in the chart those procedures where I was not present during the key portions. CHIEF COMPLAINT       Chief Complaint   Patient presents with    Shortness of Breath    Leg Swelling     Bilateral         CURRENT MEDICATIONS     Previous Medications  Previous Medications    ATORVASTATIN (LIPITOR) 40 MG TABLET    Take 1 tablet by mouth nightly    BENZOCAINE-MENTHOL (CEPACOL SORE THROAT) 15-3.6 MG LOZENGE    Take 1 lozenge by mouth every 2 hours as needed for Sore Throat    CETIRIZINE (ZYRTEC) 10 MG TABLET    Take 1 tablet by mouth daily    DICYCLOMINE (BENTYL) 10 MG CAPSULE    Take 1 capsule by mouth every 6 hours as needed (cramps)    ELASTIC BANDAGES & SUPPORTS (KNEE BRACE ADJUSTABLE HINGED) MISC    1 Device by Does not apply route as needed (comfort)    ELASTIC BANDAGES & SUPPORTS (TENNIS ELBOW SUPPORT) MISC    1 Device by Does not apply route as needed    EPINEPHRINE (EPIPEN) 0.3 MG/0.3ML SOAJ INJECTION    Inject 0.3 mLs into the muscle as needed Use as directed for allergic reaction    FUROSEMIDE (LASIX) 20 MG TABLET    Take 1 tablet by mouth in the morning. LAMOTRIGINE (LAMICTAL) 100 MG TABLET    TAKE 1 TABLET BY MOUTH EVERY MORNING AND BEFORE BEDTIME    LIDOCAINE (LIDODERM) 5 %    Place 1 patch onto the skin in the morning. 12 hours on, 12 hours off. Kristine León     MELATONIN (RA MELATONIN) 3 MG TABS TABLET    Take 1 tablet by mouth daily

## 2023-07-30 NOTE — ED NOTES
The following labs labeled with pt sticker and tubed to lab:     [] Blue     [] Lavender   [] on ice  [] Green/yellow  [x] Green/black [x] on ice  [] Yellow  [] Red  [] Pink      [] COVID-19 swab    [] Rapid  [] PCR  [] Flu Swab  [] Strep Swab  [] Peds Viral Panel     [] Urine Sample  [] Pelvic Cultures  [] Blood Cultures   [] Wound Cultures         Churches, RN  07/30/23 2358

## 2023-07-30 NOTE — ED NOTES
Report given to Michelle Walsh  No change in patient status  Continues to rest quietly       Sana Solomon  07/30/23 2662

## 2023-07-30 NOTE — ED NOTES
Gave report to [employer], all questions addressed.      Rosario Martini RN  07/30/23 1498

## 2023-07-30 NOTE — ED PROVIDER NOTES
[address] [place] [address] ED  Emergency Department Encounter  Emergency Medicine Resident     Pt Cintia Frias  MRN: [id number]  [address] 1970  Date of evaluation: 7/30/23  PCP:  Wilfredo Day MD  Note Started: 5:57 AM EDT      CHIEF COMPLAINT       Chief Complaint   Patient presents with    Shortness of Breath    Leg Swelling     Bilateral       HISTORY OF PRESENT ILLNESS  (Location/Symptom, Timing/Onset, Context/Setting, Quality, Duration, Modifying Factors, Severity.)      Sol Ba is a 52 y.o. female who presents with shortness of breath that began acutely 3 hours prior to arrival.  States that she was walking upstairs when she began having trouble breathing. States that she has a history of this happening before when she had fluid on her lungs and was in cardiac arrest about a year ago. She has been off any medication for the past year due to insurance issues. She has remote history of asthma but has not had an asthma attack in 15 years. States that she has had right leg swelling that started on Friday that is tender to touch. Denies any fevers no cough. No chest pain or abdominal pain no nausea nausea vomiting    PAST MEDICAL / SURGICAL / SOCIAL / FAMILY HISTORY      has a past medical history of Asthma, Depression, ESBL (extended spectrum beta-lactamase) producing bacteria infection, Fibromyalgia, Gastroparesis, Headache, Hyperlipidemia, Hypertension, Neuropathy, Osteoarthritis, Seizure ([address]), and Tennis elbow. has a past surgical history that includes Hysterectomy; Breast surgery; Tonsillectomy; Adenoidectomy; sinus surgery; Cosmetic surgery; knee surgery; and Cardiac catheterization (7-).       Social History     Socioeconomic History    Marital status: Single     Spouse name: Not on file    Number of children: Not on file    Years of education: Not on file    Highest education level: Not on file   Occupational History    Not on file   Tobacco Use    Smoking status:

## 2023-07-30 NOTE — ED NOTES
The following labs labeled with pt sticker and tubed to lab:     [x] Blue     [x] Lavender   [] on ice  [x] Green/yellow  [] Green/black [] on ice  [] Yellow  [] Red  [] Pink      [] COVID-19 swab    [] Rapid  [] PCR  [] Flu Swab  [] Strep Swab  [] Peds Viral Panel     [] Urine Sample  [] Pelvic Cultures  [] Blood Cultures   [] Wound Cultures        Anthony Nowak RN  07/30/23 5074

## 2023-07-30 NOTE — H&P
beta-lactamase) producing bacteria infection, Fibromyalgia, Gastroparesis, Headache, Hyperlipidemia, Hypertension, Neuropathy, Osteoarthritis, Seizure ([address]), and Tennis elbow. PAST SURGICAL HISTORY:      has a past surgical history that includes Hysterectomy; Breast surgery; Tonsillectomy; Adenoidectomy; sinus surgery; Cosmetic surgery; knee surgery; and Cardiac catheterization (7-). FAMILY HISTORY:     family history includes Cancer in her maternal grandmother, mother, and paternal grandmother; Diabetes in her brother and sister; Heart Disease in her father, maternal grandmother, and paternal grandfather; High Blood Pressure in her brother and sister. HOME MEDICATIONS:     Prior to Admission medications    Medication Sig Start Date End Date Taking? Authorizing Provider   lamoTRIgine (LAMICTAL) 100 MG tablet TAKE 1 TABLET BY MOUTH EVERY MORNING AND BEFORE BEDTIME 7/22/23   Jennifer Linares MD   predniSONE (DELTASONE) 20 MG tablet Take 3 tab po qd x 3 days, 2 tab po qd x 3 days, 1 tab po qd x 3 days 6/29/23   Jennifer Linares MD   cetirizine (ZYRTEC) 10 MG tablet Take 1 tablet by mouth daily 6/10/23   La Lopez MD   melatonin (RA MELATONIN) 3 MG TABS tablet Take 1 tablet by mouth daily  Patient not taking: Reported on 6/29/2023 12/15/22   Fay Anders MD   metoprolol tartrate (LOPRESSOR) 25 MG tablet Take 1 tablet by mouth daily  Patient not taking: Reported on 6/29/2023 8/19/22   Kiko Ness MD   lidocaine (LIDODERM) 5 % Place 1 patch onto the skin in the morning. 12 hours on, 12 hours off. .  Patient not taking: Reported on 6/29/2023 8/3/22   Chuck Worrell MD   atorvastatin (LIPITOR) 40 MG tablet Take 1 tablet by mouth nightly  Patient not taking: Reported on 6/29/2023 8/3/22   Chuck Worrell MD   furosemide (LASIX) 20 MG tablet Take 1 tablet by mouth in the morning.   Patient not taking: Reported on 6/29/2023 8/3/22   ANNA Maddox - CNP   losartan (COZAAR) 50 mg tablet

## 2023-07-31 ENCOUNTER — APPOINTMENT (OUTPATIENT)
Dept: GENERAL RADIOLOGY | Age: 53
DRG: 189 | End: 2023-07-31

## 2023-07-31 LAB
ANION GAP SERPL CALCULATED.3IONS-SCNC: 11 MMOL/L (ref 9–17)
BASOPHILS # BLD: 0 K/UL (ref 0–0.2)
BASOPHILS NFR BLD: 0 % (ref 0–2)
BILIRUB UR QL STRIP: NEGATIVE
BUN BLD-MCNC: 15 MG/DL (ref 8–26)
BUN SERPL-MCNC: 8 MG/DL (ref 6–20)
CA-I BLD-SCNC: NORMAL MMOL/L (ref 1.15–1.33)
CALCIUM SERPL-MCNC: 8.3 MG/DL (ref 8.6–10.4)
CHLORIDE BLD-SCNC: 108 MMOL/L (ref 98–107)
CHLORIDE SERPL-SCNC: 103 MMOL/L (ref 98–107)
CLARITY UR: CLEAR
CO2 BLD CALC-SCNC: 27 MMOL/L (ref 22–30)
CO2 SERPL-SCNC: 24 MMOL/L (ref 20–31)
COLOR UR: YELLOW
CREAT SERPL-MCNC: 0.6 MG/DL (ref 0.5–0.9)
EGFR, POC: >60 ML/MIN/1.73M2
EOSINOPHIL # BLD: 0.1 K/UL (ref 0–0.4)
EOSINOPHILS RELATIVE PERCENT: 2 % (ref 1–4)
EPI CELLS #/AREA URNS HPF: NORMAL /HPF (ref 0–5)
ERYTHROCYTE [DISTWIDTH] IN BLOOD BY AUTOMATED COUNT: 15.9 % (ref 11.8–14.4)
FERRITIN SERPL-MCNC: 188 NG/ML (ref 13–150)
FOLATE SERPL-MCNC: 10.7 NG/ML
GFR SERPL CREATININE-BSD FRML MDRD: >60 ML/MIN/1.73M2
GLUCOSE BLD-MCNC: 96 MG/DL (ref 74–100)
GLUCOSE SERPL-MCNC: 105 MG/DL (ref 70–99)
GLUCOSE UR STRIP-MCNC: NEGATIVE MG/DL
HCO3 VENOUS: 27.3 MMOL/L (ref 22–29)
HCT VFR BLD AUTO: 31.2 % (ref 36.3–47.1)
HCT VFR BLD AUTO: 37 % (ref 36–46)
HGB BLD-MCNC: 9.4 G/DL (ref 11.9–15.1)
HGB UR QL STRIP.AUTO: NEGATIVE
IMM GRANULOCYTES # BLD AUTO: 0 K/UL (ref 0–0.3)
IMM GRANULOCYTES NFR BLD: 0 %
IRON SATN MFR SERPL: 12 % (ref 20–55)
IRON SERPL-MCNC: 30 UG/DL (ref 37–145)
KETONES UR STRIP-MCNC: NEGATIVE MG/DL
LEUKOCYTE ESTERASE UR QL STRIP: NEGATIVE
LYMPHOCYTES NFR BLD: 1.85 K/UL (ref 1–4.8)
LYMPHOCYTES RELATIVE PERCENT: 37 % (ref 24–44)
MAGNESIUM SERPL-MCNC: 1.9 MG/DL (ref 1.6–2.6)
MCH RBC QN AUTO: 22.3 PG (ref 25.2–33.5)
MCHC RBC AUTO-ENTMCNC: 30.1 G/DL (ref 28.4–34.8)
MCV RBC AUTO: 73.9 FL (ref 82.6–102.9)
MONOCYTES NFR BLD: 0.3 K/UL (ref 0.1–0.8)
MONOCYTES NFR BLD: 6 % (ref 1–7)
MORPHOLOGY: ABNORMAL
MORPHOLOGY: ABNORMAL
NEUTROPHILS NFR BLD: 55 % (ref 36–66)
NEUTS SEG NFR BLD: 2.75 K/UL (ref 1.8–7.7)
NITRITE UR QL STRIP: NEGATIVE
NRBC BLD-RTO: 0 PER 100 WBC
O2 SAT, VEN: 49.4 % (ref 60–85)
PCO2, VEN: 45.1 MM HG (ref 41–51)
PH UR STRIP: 7 [PH] (ref 5–8)
PH VENOUS: 7.39 (ref 7.32–7.43)
PLATELET # BLD AUTO: 170 K/UL (ref 138–453)
PMV BLD AUTO: 10.9 FL (ref 8.1–13.5)
PO2, VEN: 27.1 MM HG (ref 30–50)
POC ANION GAP: 7 MMOL/L (ref 7–16)
POC CREATININE: 0.5 MG/DL (ref 0.51–1.19)
POC HEMOGLOBIN (CALC): 12.5 G/DL (ref 12–16)
POC LACTIC ACID: 0.6 MMOL/L (ref 0.56–1.39)
POSITIVE BASE EXCESS, VEN: 1.8 MMOL/L (ref 0–3)
POTASSIUM BLD-SCNC: 3.5 MMOL/L (ref 3.5–4.5)
POTASSIUM SERPL-SCNC: 3.3 MMOL/L (ref 3.7–5.3)
PROT UR STRIP-MCNC: NEGATIVE MG/DL
RBC # BLD AUTO: 4.22 M/UL (ref 3.95–5.11)
RBC #/AREA URNS HPF: NORMAL /HPF (ref 0–4)
SODIUM BLD-SCNC: 141 MMOL/L (ref 138–146)
SODIUM SERPL-SCNC: 138 MMOL/L (ref 135–144)
SP GR UR STRIP: 1.01 (ref 1–1.03)
TIBC SERPL-MCNC: 246 UG/DL (ref 250–450)
TSH SERPL DL<=0.05 MIU/L-ACNC: 2.11 UIU/ML (ref 0.3–5)
UNSATURATED IRON BINDING CAPACITY: 216 UG/DL (ref 112–347)
UROBILINOGEN UR STRIP-ACNC: NORMAL EU/DL (ref 0–1)
VIT B12 SERPL-MCNC: 408 PG/ML (ref 232–1245)
WBC #/AREA URNS HPF: NORMAL /HPF (ref 0–5)
WBC OTHER # BLD: 5 K/UL (ref 3.5–11.3)

## 2023-07-31 PROCEDURE — 84443 ASSAY THYROID STIM HORMONE: CPT

## 2023-07-31 PROCEDURE — 82746 ASSAY OF FOLIC ACID SERUM: CPT

## 2023-07-31 PROCEDURE — 86038 ANTINUCLEAR ANTIBODIES: CPT

## 2023-07-31 PROCEDURE — 6360000002 HC RX W HCPCS

## 2023-07-31 PROCEDURE — 2700000000 HC OXYGEN THERAPY PER DAY

## 2023-07-31 PROCEDURE — 97535 SELF CARE MNGMENT TRAINING: CPT

## 2023-07-31 PROCEDURE — 82728 ASSAY OF FERRITIN: CPT

## 2023-07-31 PROCEDURE — 86225 DNA ANTIBODY NATIVE: CPT

## 2023-07-31 PROCEDURE — 83540 ASSAY OF IRON: CPT

## 2023-07-31 PROCEDURE — 80048 BASIC METABOLIC PNL TOTAL CA: CPT

## 2023-07-31 PROCEDURE — 81001 URINALYSIS AUTO W/SCOPE: CPT

## 2023-07-31 PROCEDURE — 71046 X-RAY EXAM CHEST 2 VIEWS: CPT

## 2023-07-31 PROCEDURE — 6370000000 HC RX 637 (ALT 250 FOR IP)

## 2023-07-31 PROCEDURE — 83550 IRON BINDING TEST: CPT

## 2023-07-31 PROCEDURE — 94761 N-INVAS EAR/PLS OXIMETRY MLT: CPT

## 2023-07-31 PROCEDURE — 83735 ASSAY OF MAGNESIUM: CPT

## 2023-07-31 PROCEDURE — 85025 COMPLETE CBC W/AUTO DIFF WBC: CPT

## 2023-07-31 PROCEDURE — 94760 N-INVAS EAR/PLS OXIMETRY 1: CPT

## 2023-07-31 PROCEDURE — 94660 CPAP INITIATION&MGMT: CPT

## 2023-07-31 PROCEDURE — 97166 OT EVAL MOD COMPLEX 45 MIN: CPT

## 2023-07-31 PROCEDURE — 36415 COLL VENOUS BLD VENIPUNCTURE: CPT

## 2023-07-31 PROCEDURE — 99232 SBSQ HOSP IP/OBS MODERATE 35: CPT | Performed by: STUDENT IN AN ORGANIZED HEALTH CARE EDUCATION/TRAINING PROGRAM

## 2023-07-31 PROCEDURE — 2000000000 HC ICU R&B

## 2023-07-31 PROCEDURE — 97530 THERAPEUTIC ACTIVITIES: CPT

## 2023-07-31 PROCEDURE — 82607 VITAMIN B-12: CPT

## 2023-07-31 PROCEDURE — 2580000003 HC RX 258

## 2023-07-31 RX ORDER — LABETALOL HYDROCHLORIDE 5 MG/ML
10 INJECTION, SOLUTION INTRAVENOUS EVERY 6 HOURS PRN
Status: DISCONTINUED | OUTPATIENT
Start: 2023-07-31 | End: 2023-08-01 | Stop reason: HOSPADM

## 2023-07-31 RX ORDER — PROCHLORPERAZINE EDISYLATE 5 MG/ML
10 INJECTION INTRAMUSCULAR; INTRAVENOUS ONCE
Status: COMPLETED | OUTPATIENT
Start: 2023-07-31 | End: 2023-07-31

## 2023-07-31 RX ORDER — DIPHENHYDRAMINE HYDROCHLORIDE 50 MG/ML
50 INJECTION INTRAMUSCULAR; INTRAVENOUS ONCE
Status: COMPLETED | OUTPATIENT
Start: 2023-07-31 | End: 2023-07-31

## 2023-07-31 RX ORDER — MAGNESIUM SULFATE IN WATER 40 MG/ML
2000 INJECTION, SOLUTION INTRAVENOUS ONCE
Status: COMPLETED | OUTPATIENT
Start: 2023-07-31 | End: 2023-07-31

## 2023-07-31 RX ORDER — NITROGLYCERIN 20 MG/100ML
5-200 INJECTION INTRAVENOUS CONTINUOUS
Status: DISCONTINUED | OUTPATIENT
Start: 2023-07-31 | End: 2023-07-31

## 2023-07-31 RX ORDER — LISINOPRIL 10 MG/1
20 TABLET ORAL DAILY
Status: DISCONTINUED | OUTPATIENT
Start: 2023-07-31 | End: 2023-08-01 | Stop reason: HOSPADM

## 2023-07-31 RX ADMIN — PROCHLORPERAZINE EDISYLATE 10 MG: 5 INJECTION INTRAMUSCULAR; INTRAVENOUS at 14:41

## 2023-07-31 RX ADMIN — LISINOPRIL 20 MG: 10 TABLET ORAL at 08:49

## 2023-07-31 RX ADMIN — ONDANSETRON 4 MG: 2 INJECTION INTRAMUSCULAR; INTRAVENOUS at 08:50

## 2023-07-31 RX ADMIN — ENOXAPARIN SODIUM 30 MG: 30 INJECTION SUBCUTANEOUS at 21:01

## 2023-07-31 RX ADMIN — LAMOTRIGINE 100 MG: 100 TABLET ORAL at 08:49

## 2023-07-31 RX ADMIN — POTASSIUM BICARBONATE 40 MEQ: 782 TABLET, EFFERVESCENT ORAL at 08:49

## 2023-07-31 RX ADMIN — DIPHENHYDRAMINE HYDROCHLORIDE 50 MG: 50 INJECTION, SOLUTION INTRAMUSCULAR; INTRAVENOUS at 14:41

## 2023-07-31 RX ADMIN — LAMOTRIGINE 100 MG: 100 TABLET ORAL at 21:58

## 2023-07-31 RX ADMIN — SODIUM CHLORIDE, PRESERVATIVE FREE 10 ML: 5 INJECTION INTRAVENOUS at 21:01

## 2023-07-31 RX ADMIN — ENOXAPARIN SODIUM 30 MG: 30 INJECTION SUBCUTANEOUS at 08:49

## 2023-07-31 RX ADMIN — SODIUM CHLORIDE, PRESERVATIVE FREE 10 ML: 5 INJECTION INTRAVENOUS at 08:50

## 2023-07-31 RX ADMIN — MAGNESIUM SULFATE HEPTAHYDRATE 2000 MG: 40 INJECTION, SOLUTION INTRAVENOUS at 11:37

## 2023-07-31 ASSESSMENT — ENCOUNTER SYMPTOMS
CONSTIPATION: 0
DIARRHEA: 0
SORE THROAT: 0
VOMITING: 0
ABDOMINAL PAIN: 0
BACK PAIN: 1
COUGH: 0
SHORTNESS OF BREATH: 0
NAUSEA: 0

## 2023-07-31 ASSESSMENT — PAIN DESCRIPTION - LOCATION
LOCATION: HEAD

## 2023-07-31 ASSESSMENT — PAIN SCALES - GENERAL
PAINLEVEL_OUTOF10: 3

## 2023-07-31 ASSESSMENT — PAIN DESCRIPTION - DESCRIPTORS
DESCRIPTORS: ACHING

## 2023-07-31 NOTE — CARE COORDINATION
Case Management Assessment  Initial Evaluation    Date/Time of Evaluation: 7/31/2023 11:51 AM  Assessment Completed by: Ruby Guevara RN    If patient is discharged prior to next notation, then this note serves as note for discharge by case management. Patient Name: Amy Kenney                   YOB: 1970  Diagnosis: Acute pulmonary edema ([address]) [J81.0]  Pulmonary edema without heart failure [J81.1]  Right leg swelling [M79.89]  Acute on chronic congestive heart failure, unspecified heart failure type ([address]) [I50.9]                   Date / Time: 7/30/2023  5:49 AM    Patient Admission Status: Inpatient   Readmission Risk (Low < 19, Mod (19-27), High > 27): Readmission Risk Score: 12.3    Current PCP: Tye Woods MD  PCP verified by ? Yes    Chart Reviewed: Yes      History Provided by: Patient  Patient Orientation: Alert and Oriented    Patient Cognition: Alert    Hospitalization in the last 30 days (Readmission):  Yes    If yes, Readmission Assessment in  Navigator will be completed. Advance Directives:      Code Status: Full Code   Patient's Primary Decision Maker is: Legal Next of Kin    Primary Decision Maker: Glen Arce - Brother/Sister - [phone]    Discharge Planning:    Patient lives with: Alone Type of Home: Apartment  Primary Care Giver: Self  Patient Support Systems include: Family Members   Current Financial resources: HELP  Current community resources: None  Current services prior to admission: None            Current DME:              Type of Home Care services:  None    ADLS  Prior functional level: Independent in ADLs/IADLs  Current functional level: Independent in ADLs/IADLs    PT AM-PAC:   /24  OT AM-PAC: 20 /24    Family can provide assistance at DC: Yes  Would you like Case Management to discuss the discharge plan with any other family members/significant others, and if so, who?     Plans to Return to Present Housing: Yes  Other Identified Issues/Barriers

## 2023-08-01 ENCOUNTER — APPOINTMENT (OUTPATIENT)
Age: 53
DRG: 189 | End: 2023-08-01

## 2023-08-01 VITALS
HEART RATE: 67 BPM | DIASTOLIC BLOOD PRESSURE: 98 MMHG | RESPIRATION RATE: 16 BRPM | TEMPERATURE: 98 F | WEIGHT: 264.99 LBS | OXYGEN SATURATION: 100 % | SYSTOLIC BLOOD PRESSURE: 167 MMHG | BODY MASS INDEX: 40.29 KG/M2

## 2023-08-01 LAB
ANION GAP SERPL CALCULATED.3IONS-SCNC: 10 MMOL/L (ref 9–17)
BASOPHILS # BLD: <0.03 K/UL (ref 0–0.2)
BASOPHILS NFR BLD: 0 % (ref 0–2)
BUN SERPL-MCNC: 7 MG/DL (ref 6–20)
CALCIUM SERPL-MCNC: 8.5 MG/DL (ref 8.6–10.4)
CHLORIDE SERPL-SCNC: 103 MMOL/L (ref 98–107)
CO2 SERPL-SCNC: 25 MMOL/L (ref 20–31)
CREAT SERPL-MCNC: 0.5 MG/DL (ref 0.5–0.9)
ECHO AO ROOT DIAM: 2.5 CM
ECHO AV AREA PEAK VELOCITY: 2.2 CM2
ECHO AV AREA VTI: 2.5 CM2
ECHO AV MEAN GRADIENT: 3 MMHG
ECHO AV MEAN VELOCITY: 0.8 M/S
ECHO AV PEAK GRADIENT: 7 MMHG
ECHO AV PEAK VELOCITY: 1.4 M/S
ECHO AV VELOCITY RATIO: 0.71
ECHO AV VTI: 29.5 CM
ECHO IVC PROX: 2.2 CM
ECHO LA AREA 2C: 25.6 CM2
ECHO LA AREA 4C: 26.7 CM2
ECHO LA DIAMETER: 4.5 CM
ECHO LA MAJOR AXIS: 6.1 CM
ECHO LA MINOR AXIS: 6.1 CM
ECHO LA TO AORTIC ROOT RATIO: 1.8
ECHO LA VOL 2C: 89 ML (ref 22–52)
ECHO LA VOL 4C: 94 ML (ref 22–52)
ECHO LA VOL BP: 91 ML (ref 22–52)
ECHO LV E' LATERAL VELOCITY: 6 CM/S
ECHO LV E' SEPTAL VELOCITY: 6 CM/S
ECHO LV EDV 3D: 175 ML
ECHO LV EJECTION FRACTION 3D: 65 %
ECHO LV ESV 3D: 62 ML
ECHO LV FRACTIONAL SHORTENING: 33 % (ref 28–44)
ECHO LV INTERNAL DIMENSION DIASTOLIC: 4.8 CM (ref 3.9–5.3)
ECHO LV INTERNAL DIMENSION SYSTOLIC: 3.2 CM
ECHO LV IVSD: 0.7 CM (ref 0.6–0.9)
ECHO LV MASS 2D: 137.1 G (ref 67–162)
ECHO LV MASS 3D: 185 G
ECHO LV POSTERIOR WALL DIASTOLIC: 1 CM (ref 0.6–0.9)
ECHO LV RELATIVE WALL THICKNESS RATIO: 0.42
ECHO LVOT AREA: 2.8 CM2
ECHO LVOT AV VTI INDEX: 0.86
ECHO LVOT DIAM: 1.9 CM
ECHO LVOT MEAN GRADIENT: 2 MMHG
ECHO LVOT PEAK GRADIENT: 4 MMHG
ECHO LVOT PEAK VELOCITY: 1 M/S
ECHO LVOT SV: 72.3 ML
ECHO LVOT VTI: 25.5 CM
ECHO MV A VELOCITY: 1.46 M/S
ECHO MV AREA VTI: 1.1 CM2
ECHO MV E DECELERATION TIME (DT): 348 MS
ECHO MV E VELOCITY: 1.78 M/S
ECHO MV E/A RATIO: 1.22
ECHO MV E/E' LATERAL: 29.67
ECHO MV E/E' RATIO (AVERAGED): 29.67
ECHO MV E/E' SEPTAL: 29.67
ECHO MV LVOT VTI INDEX: 2.56
ECHO MV MAX VELOCITY: 1.8 M/S
ECHO MV MEAN GRADIENT: 7 MMHG
ECHO MV MEAN VELOCITY: 1.3 M/S
ECHO MV PEAK GRADIENT: 13 MMHG
ECHO MV REGURGITANT PEAK GRADIENT: 139 MMHG
ECHO MV REGURGITANT PEAK VELOCITY: 5.9 M/S
ECHO MV REGURGITANT VTIA: 228 CM
ECHO MV VTI: 65.3 CM
ECHO PV MAX VELOCITY: 1.1 M/S
ECHO PV PEAK GRADIENT: 5 MMHG
ECHO PVEIN A VELOCITY: 0.3 M/S
ECHO RV INTERNAL DIMENSION: 2.7 CM
ECHO TV REGURGITANT MAX VELOCITY: 2.99 M/S
ECHO TV REGURGITANT PEAK GRADIENT: 36 MMHG
EOSINOPHIL # BLD: 0.21 K/UL (ref 0–0.44)
EOSINOPHILS RELATIVE PERCENT: 4 % (ref 1–4)
ERYTHROCYTE [DISTWIDTH] IN BLOOD BY AUTOMATED COUNT: 16.2 % (ref 11.8–14.4)
GFR SERPL CREATININE-BSD FRML MDRD: >60 ML/MIN/1.73M2
GLUCOSE SERPL-MCNC: 85 MG/DL (ref 70–99)
HCT VFR BLD AUTO: 32.2 % (ref 36.3–47.1)
HGB BLD-MCNC: 9.5 G/DL (ref 11.9–15.1)
IMM GRANULOCYTES # BLD AUTO: <0.03 K/UL (ref 0–0.3)
IMM GRANULOCYTES NFR BLD: 0 %
LYMPHOCYTES NFR BLD: 1.89 K/UL (ref 1.1–3.7)
LYMPHOCYTES RELATIVE PERCENT: 37 % (ref 24–43)
MCH RBC QN AUTO: 22.1 PG (ref 25.2–33.5)
MCHC RBC AUTO-ENTMCNC: 29.5 G/DL (ref 28.4–34.8)
MCV RBC AUTO: 75.1 FL (ref 82.6–102.9)
MONOCYTES NFR BLD: 0.46 K/UL (ref 0.1–1.2)
MONOCYTES NFR BLD: 9 % (ref 3–12)
NEUTROPHILS NFR BLD: 50 % (ref 36–65)
NEUTS SEG NFR BLD: 2.54 K/UL (ref 1.5–8.1)
NRBC BLD-RTO: 0 PER 100 WBC
PLATELET # BLD AUTO: 210 K/UL (ref 138–453)
PMV BLD AUTO: 12.2 FL (ref 8.1–13.5)
POTASSIUM SERPL-SCNC: 3.8 MMOL/L (ref 3.7–5.3)
RBC # BLD AUTO: 4.29 M/UL (ref 3.95–5.11)
RBC # BLD: ABNORMAL 10*6/UL
SODIUM SERPL-SCNC: 138 MMOL/L (ref 135–144)
WBC OTHER # BLD: 5.1 K/UL (ref 3.5–11.3)

## 2023-08-01 PROCEDURE — 99239 HOSP IP/OBS DSCHRG MGMT >30: CPT | Performed by: STUDENT IN AN ORGANIZED HEALTH CARE EDUCATION/TRAINING PROGRAM

## 2023-08-01 PROCEDURE — 36415 COLL VENOUS BLD VENIPUNCTURE: CPT

## 2023-08-01 PROCEDURE — 6370000000 HC RX 637 (ALT 250 FOR IP)

## 2023-08-01 PROCEDURE — 93306 TTE W/DOPPLER COMPLETE: CPT | Performed by: INTERNAL MEDICINE

## 2023-08-01 PROCEDURE — 6360000002 HC RX W HCPCS

## 2023-08-01 PROCEDURE — 85025 COMPLETE CBC W/AUTO DIFF WBC: CPT

## 2023-08-01 PROCEDURE — 80048 BASIC METABOLIC PNL TOTAL CA: CPT

## 2023-08-01 PROCEDURE — 2580000003 HC RX 258

## 2023-08-01 PROCEDURE — 93306 TTE W/DOPPLER COMPLETE: CPT

## 2023-08-01 RX ORDER — LISINOPRIL 20 MG/1
20 TABLET ORAL DAILY
Qty: 30 TABLET | Refills: 3 | Status: SHIPPED | OUTPATIENT
Start: 2023-08-02

## 2023-08-01 RX ORDER — AMLODIPINE BESYLATE 5 MG/1
5 TABLET ORAL DAILY
Qty: 30 TABLET | Refills: 3 | Status: SHIPPED | OUTPATIENT
Start: 2023-08-01

## 2023-08-01 RX ADMIN — LAMOTRIGINE 100 MG: 100 TABLET ORAL at 08:41

## 2023-08-01 RX ADMIN — ENOXAPARIN SODIUM 30 MG: 30 INJECTION SUBCUTANEOUS at 08:41

## 2023-08-01 RX ADMIN — SODIUM CHLORIDE, PRESERVATIVE FREE 10 ML: 5 INJECTION INTRAVENOUS at 08:42

## 2023-08-01 RX ADMIN — LISINOPRIL 20 MG: 10 TABLET ORAL at 08:41

## 2023-08-01 ASSESSMENT — ENCOUNTER SYMPTOMS
COUGH: 0
ABDOMINAL PAIN: 0
VOMITING: 0
CONSTIPATION: 0
DIARRHEA: 0
NAUSEA: 1
SHORTNESS OF BREATH: 0

## 2023-08-01 ASSESSMENT — PAIN SCALES - GENERAL
PAINLEVEL_OUTOF10: 2
PAINLEVEL_OUTOF10: 2

## 2023-08-01 NOTE — DISCHARGE INSTR - DIET

## 2023-08-01 NOTE — DISCHARGE INSTR - COC
Continuity of Care Form    Patient Name: Diana Tripp   :  1970  MRN:  [id number]    Admit date:  2023  Discharge date:  ***    Code Status Order: Full Code   Advance Directives:     Admitting Physician:  Lita Pablo MD  PCP: Andrea Clark MD    Discharging Nurse: [facility] Unit/Room#: 0101/0101-01  Discharging Unit Phone Number: ***    Emergency Contact:   Extended Emergency Contact Information  Primary Emergency Contact: [address] of [address] Phone: [phone]  Relation: Brother/Sister  Secondary Emergency Contact: [place] Phone: [phone]  Relation: Brother/Sister    Past Surgical History:  Past Surgical History:   Procedure Laterality Date    ADENOIDECTOMY      BREAST SURGERY      CARDIAC CATHETERIZATION  2015    COSMETIC SURGERY      nose    HYSTERECTOMY (CERVIX STATUS UNKNOWN)      KNEE SURGERY      SINUS SURGERY      TONSILLECTOMY         Immunization History: There is no immunization history on file for this patient. Active Problems:  Patient Active Problem List   Diagnosis Code    Chest pain R07.9    Abdominal pain R10.9    Gastroparesis K31.84    Hypertension I10    Asthma J45.909    Neuropathy G62.9    Fibromyalgia M79.7    Depression F32. A    S/P cardiac cath- Normal 7/14/15 -= Dr. 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

## 2023-08-01 NOTE — PLAN OF CARE
Problem: Discharge Planning  Goal: Discharge to home or other facility with appropriate resources  8/1/2023 0013 by Srikanth Dang RN  Outcome: Progressing  7/31/2023 1709 by Hal Talavera RN  Outcome: Progressing  Flowsheets (Taken 7/31/2023 1709)  Discharge to home or other facility with appropriate resources:   Identify barriers to discharge with patient and caregiver   Identify discharge learning needs (meds, wound care, etc)  Note: Plans to return home at baseline. Problem: Pain  Goal: Verbalizes/displays adequate comfort level or baseline comfort level  8/1/2023 0013 by Srikanth Dang RN  Outcome: Progressing  7/31/2023 1709 by Hal Talavera RN  Outcome: Progressing  Flowsheets (Taken 7/31/2023 1709)  Verbalizes/displays adequate comfort level or baseline comfort level:   Encourage patient to monitor pain and request assistance   Assess pain using appropriate pain scale   Implement non-pharmacological measures as appropriate and evaluate response   Notify Licensed Independent Practitioner if interventions unsuccessful or patient reports new pain  Note: Patient reports headache that is chronic. Patient refuses medication at this time. Will continue to follow care plan.      Problem: Safety - Adult  Goal: Free from fall injury  8/1/2023 0013 by Srikanth Dang RN  Outcome: Progressing  7/31/2023 1709 by Hal Talavera RN  Outcome: Progressing  Flowsheets (Taken 7/31/2023 1709)  Free From Fall Injury: Instruct family/caregiver on patient safety

## 2023-08-01 NOTE — DISCHARGE SUMMARY
Department of [address] [place] [address]    Discharge Summary      NAME:  Soto Gardiner  :  1970  MRN:  [id number]    Admit date:  2023  Discharge date: 23    Admitting Physician:  Laurie Gonzales MD    Primary Diagnosis on Admission:   Present on Admission:   Acute pulmonary edema ([address])   Focal epilepsy ([address])   Chest pain   Gastroparesis      Secondary Diagnoses:  does not have any pertinent problems on file. Admission Condition:  poor     Discharged Condition: good    Hospital Course: The patient was admitted for the management of pulmonary edema. PMH significant for HTN, gastroparesis, seizures. Patient presented with shortness of breath and swelling in both legs which started on 23. Patient presented 1 year ago with a similar episode and was treated for pulmonary edema at that time. In the ED, cardiac work-up was negative, D-dimer slightly elevated, chest x-ray concerning for vascular congestion versus pneumonia, CT PE was negative for PE but showed bilateral hilar lymphadenopathy and interstitial edema. Blood pressure was elevated at greater than 959 systolic. Patient was started on nitro drip and BiPAP. On the floor, patient was weaned off of nitro drip and started on lisinopril 20 mg as her blood pressure became controlled. Her shortness of breath also improved as she was saturating well on room air. Echocardiogram showed normal EF of 65% and no concerns for heart failure.     Consults: none    Significant Diagnostic/theraputic interventions:    chest x-ray: Mild vascular congestion, interstitial edema versus pneumonia   CT PE: No evidence of PE, mild hilar lymphadenopathy, interstitial edema   vascular duplex lower extremity right: No evidence of DVT   chest x-ray: Improved pulmonary edema   echocardiogram: EF 65%, normal wall motion, normal diastolic function      -Nitro drip, lisinopril, Benadryl and

## 2023-08-01 NOTE — DISCHARGE INSTRUCTIONS
Follow up with PCP regarding blood pressure adjustment  Take medications as prescribed  Return to ED if you develop chest pain, shortness of breath

## 2023-08-02 ENCOUNTER — TELEPHONE (OUTPATIENT)
Dept: FAMILY MEDICINE CLINIC | Age: 53
End: 2023-08-02

## 2023-08-02 NOTE — TELEPHONE ENCOUNTER
Patient called into the office to schedule a hospital follow up appt. Writer could hardly hear patient due to the background noise coming from patient's phone. Patient started yelling at Debbie Lawrence saying, Hemal Juarez turned down her TV and doesn't understand why I can't hear her\". Writer said I can hear you now, and she still was being hostile, so writer transferred call to [facility] ([facility] A few minutes later [facility] called saying they needed to schedule a hospital follow up and needed writers help. Writer got back on the phone with patient and informed her the soonest appt with her PCP was 08/24/23. Patient again began yelling saying \"no that's not good enough, that she was told to follow up in 1 week\". Writer told patient that she can get an appt for Monday, but it's not with her PCP. Patient says, look I had a problem with the first lady and now you. Writer informed patient that I was the first lady. Patient said \"she's not feeling well, but she ain't going talk to me and that she will call back\". Call was disconnected.

## 2023-08-02 NOTE — PROGRESS NOTES
[facility]  Progress Note    Date:   8/1/2023  Patient name:  Tanmay Howell  Date of admission:  7/30/2023  5:49 AM  MRN:   [id number]  YOB: 1970    SUBJECTIVE/Last 24 hours update:     Patient examined at bedside this morning. No acute events overnight. She states that her chest pain and shortness of breath is significantly improved since admission. She rates her chest pain as 1 or 2 out of 10. She notes some headaches and nausea. She has had 1 bowel movement since yesterday. Echocardiogram pending. HPI:     The patient is a 52 y.o.  female with history of HTN (not taking any medications), gastroparesis (per pt, and not on any medication) and seizure disorder presented to the ED with complaints of right leg swelling that started on Friday (2 days ago) which is now affecting both legs and dyspnea, worse with exertion, accompanied by chest pain, that started this morning. Patient had an identical episode like this 1 year ago and was treated for pulmonary edema at that time. In the ED, cardiac workup was negative, d-dimer slightly elevated, CXR concerning for vascular congestion versus pneumonia, CT PE was negative for PE but did show bilateral hilar lymphadenopathy which is unchanged from 1 year ago, as well as interstitial edema. BP was also elevated SBP >190. Patient was started on nitro gtt and BiPAP. Patient reports no prior cardiac hx, she did have an echo and cath done 1 year ago during last episode, there was some diastolic dysfunction on imaging and cath was normal. Patient was on Lasix for a short period but was taken off of them as she did not have any signs of fluid overload. There is documentation of hx of asthma, pt has not had any issues with asthma for over 10-15 years and is not currently on any inhalers.      She does report a significant amount of stress lately, family member passing, working night shifts,
CLINICAL PHARMACY NOTE: MEDS TO BEDS    Total # of Prescriptions Filled: 2   The following medications were delivered to the patient:  Lisinopril  amlodipine    Additional Documentation:
[facility] - [address] [place] [address]  PROGRESS NOTE    Shift date: 08/01/23Tuesday  Shift day: Tuesday   Shift # 2    Room # 0101/0101-01   Name: Aung Flores                Religious: [address] [place] [address] [place]: [facility]    Referral: Routine Visit    Admit Date & Time: 7/30/2023  5:49 AM    Assessment:  Aung Flores is a 52 y.o. female in the hospital       Intervention:  Writer introduced self and title as . Patient did not appear to mind  presence and engaged in conversation. Patient discussed her recent health and what led to her hospital visit. Patient appeared hopeful of getting to the bottom of health concerns. Patient discussed a recent medical facility visit along with family health.  provided a supportive presence through active listening and words of affirmation.  offered prayer which was accepted by patient. Outcome:  Patient expressed gratitude for  visit. .    Plan:  Chaplains will remain available to offer spiritual and emotional support as needed.       Electronically signed by Sangita Bhakta on 8/1/2023 at 6:50 PM.  [address] [place]  [phone]
Occupational Therapy  Facility/Department: [address] NEURO  Occupational Therapy Initial Assessment    Name: Anthony Marshall  : 1970  MRN: [id number]  Date of Service: 2023    Copied from Family Medicine: The patient is a 52 y.o.  female with history of HTN (not taking any medications), gastroparesis (per pt, and not on any medication) and seizure disorder presented to the ED with complaints of right leg swelling that started on Friday (2 days ago) which is now affecting both legs and dyspnea, worse with exertion, accompanied by chest pain, that started this morning. Patient had an identical episode like this 1 year ago and was treated for pulmonary edema at that time. In the ED, cardiac workup was negative, d-dimer slightly elevated, CXR concerning for vascular congestion versus pneumonia, CT PE was negative for PE but did show bilateral hilar lymphadenopathy which is unchanged from 1 year ago, as well as interstitial edema. BP was also elevated SBP >190. Patient was started on nitro gtt and BiPAP. Discharge Recommendations:  Patient would benefit from continued therapy after discharge  OT Equipment Recommendations  Equipment Needed: Yes  Mobility Devices: ADL Assistive Devices  ADL Assistive Devices: Shower Chair with back       Patient Diagnosis(es): The primary encounter diagnosis was Acute on chronic congestive heart failure, unspecified heart failure type ([address]). Diagnoses of Right leg swelling and Acute pulmonary edema (HCC) were also pertinent to this visit. Past Medical History:  has a past medical history of Asthma, Depression, ESBL (extended spectrum beta-lactamase) producing bacteria infection, Fibromyalgia, Gastroparesis, Headache, Hyperlipidemia, Hypertension, Neuropathy, Osteoarthritis, Seizure ([address]), and Tennis elbow. Past Surgical History:  has a past surgical history that includes Hysterectomy; Breast surgery; Tonsillectomy; Adenoidectomy; sinus surgery;  Cosmetic surgery; knee surgery;
Physician Progress Note      PATIENT:               Coretta Garcia  CSN #:                  [id number]  :                       1970  ADMIT DATE:       2023 5:49 AM  DISCH DATE:  RESPONDING  PROVIDER #:        SOCORRO ALCAZAR          QUERY TEXT:    Pt admitted with Acute pulmonary edema. Pt noted to have been placed on   BiPAP, patient does NOT use home O2. If possible, please document in the   progress notes and discharge summary if you are evaluating and/or treating any   of the following: The medical record reflects the following:  Risk Factors: HTN, PMH asthma and interstitial edema  Clinical Indicators: HR 95  RR 35 >25  , FiO2 40, , D-Dimer,   Quant 2.13, pO2 27.1, O2 maverick sat 49.4, per H&P; RESPIRATORY: + dyspnea,   CARDIOVASCULAR: + chest pain, Lungs - Bilateral equal air entry, on BiPAP,   crackles bilaterally. CXR;Mild vascular congestion and diffuse interstitial   opacities throughout both lungs, possibly interstitial edema versus   atypical/viral pneumonia. CT;Small pleural effusions and scattered   interstitial thickening, suggesting interstitial edema and atelectasis. Treatment: O2 2 L/min BiPAP, tanya gtt, Lasix IV once, monitoring    Thank you, Please call if questions  Zoila Celestin RN CDS  [phone]  Options provided:  -- Acute respiratory failure with hypoxia  -- Acute respiratory failure with hypercapnia  -- Acute respiratory failure with hypoxia and hypercapnia  -- Other - I will add my own diagnosis  -- Disagree - Not applicable / Not valid  -- Disagree - Clinically unable to determine / Unknown  -- Refer to Clinical Documentation Reviewer    PROVIDER RESPONSE TEXT:    Acute resp failure / to pulmonary edema    Query created by: Calin Perkins on 2023 12:46 PM      Electronically signed by:  Perlita Mcfarland 2023 1:13 PM
suppository 650 mg  650 mg Rectal Q6H PRN Titus Frazier MD        potassium chloride (KLOR-CON M) extended release tablet 40 mEq  40 mEq Oral PRN Titus Frazier MD        Or    potassium bicarb-citric acid (EFFER-K) effervescent tablet 40 mEq  40 mEq Oral PRN Titus Frazier MD        Or    potassium chloride 10 mEq/100 mL IVPB (Peripheral Line)  10 mEq IntraVENous PRN Titus Frazier MD        magnesium sulfate 2000 mg in 50 mL IVPB premix  2,000 mg IntraVENous PRN Titus Frazier MD         ASSESSMENT:     Principal Problem:    Acute pulmonary edema ([address])  Active Problems:    Focal epilepsy ([address])    Chest pain    Gastroparesis  Resolved Problems:    * No resolved hospital problems. *    PLAN:     Pulmonary edema - improved  - Tachypneic in ED  - Off BiPAP  - Echo and cath done 1 year ago, unremarkable apart form some diastolic dysfunction  - Repeat echo pending  - Procal negative  - Repeat CXR pending     Hypertensive urgency - resolved  - Off nitro gtt  - Troponins and EKG wnl   - Patient has side effect to losartan at home. Will try lisinopril at 20 and monitor today.      Seizure disorder  - Well controlled on Lamictal 100 mg BID        DVT prophylaxis: Lovenox 30 BID subcu  GI Ppx: None  Diet: Regular low Na    PT/OT consults in place    CODE STATUS Full    Plan will be discussed with the attending, Dr. Jennifer Cuadra MD  [place] PGY-3  7/31/2023 7:17 AM

## 2023-08-03 LAB
ANA SER QL IA: NEGATIVE
DSDNA IGG SER QL IA: 3.2 IU/ML
EKG ATRIAL RATE: 86 BPM
EKG P AXIS: 42 DEGREES
EKG P-R INTERVAL: 180 MS
EKG Q-T INTERVAL: 376 MS
EKG QRS DURATION: 74 MS
EKG QTC CALCULATION (BAZETT): 449 MS
EKG R AXIS: 56 DEGREES
EKG T AXIS: 45 DEGREES
EKG VENTRICULAR RATE: 86 BPM
NUCLEAR IGG SER IA-RTO: 0.4 U/ML

## 2023-08-25 ENCOUNTER — HOSPITAL ENCOUNTER (OUTPATIENT)
Dept: MRI IMAGING | Age: 53
Discharge: HOME | End: 2023-08-25
Attending: PSYCHIATRY & NEUROLOGY

## 2023-08-25 DIAGNOSIS — M54.12 CERVICAL RADICULOPATHY: ICD-10-CM

## 2023-08-25 PROCEDURE — 72141 MRI NECK SPINE W/O DYE: CPT

## 2023-09-21 ENCOUNTER — TELEPHONE (OUTPATIENT)
Dept: NEUROLOGY | Age: 53
End: 2023-09-21

## 2023-09-21 DIAGNOSIS — M54.12 CERVICAL RADICULOPATHY: Primary | ICD-10-CM

## 2023-09-21 NOTE — TELEPHONE ENCOUNTER
Pt called back in about her MRI results. I informed her of the results and that we can refer to Pain Mgmt if the pain continues. She said that she does still have pain and asked that we go ahead with the referral. Referral was placed to Dr Ivon Mccollum in [place].

## 2023-09-21 NOTE — TELEPHONE ENCOUNTER
----- Message from Benjamin Luis MD sent at 9/20/2023  8:48 AM EDT -----  MRI cervical spine showed some disc changes. If patient continues to have neck pain, we can refer to pain management.

## 2023-10-09 DIAGNOSIS — R56.9 SEIZURE (HCC): Primary | ICD-10-CM

## 2023-10-10 NOTE — TELEPHONE ENCOUNTER
Pharmacy requesting refill of Lamictal .      Medication active on med list yes      Date of last fill: 7/22/23  verified on 10/10/2023   verified by [facility] LPN      Date of last appointment 6/29/23    Next Visit Date:  10/26/2023

## 2023-10-11 RX ORDER — LAMOTRIGINE 100 MG/1
100 TABLET ORAL 2 TIMES DAILY
Qty: 60 TABLET | Refills: 0 | Status: SHIPPED | OUTPATIENT
Start: 2023-10-11 | End: 2023-11-10

## 2023-10-18 ENCOUNTER — HOSPITAL ENCOUNTER (EMERGENCY)
Age: 53
Discharge: HOME | End: 2023-10-18
Attending: EMERGENCY MEDICINE

## 2023-10-18 VITALS
HEART RATE: 71 BPM | SYSTOLIC BLOOD PRESSURE: 131 MMHG | DIASTOLIC BLOOD PRESSURE: 71 MMHG | WEIGHT: 270.95 LBS | BODY MASS INDEX: 41.06 KG/M2 | HEIGHT: 68 IN | RESPIRATION RATE: 14 BRPM | OXYGEN SATURATION: 100 % | TEMPERATURE: 97.8 F

## 2023-10-18 DIAGNOSIS — H72.91 PERFORATION OF RIGHT TYMPANIC MEMBRANE: Primary | ICD-10-CM

## 2023-10-18 PROCEDURE — 6370000000 HC RX 637 (ALT 250 FOR IP)

## 2023-10-18 PROCEDURE — 96372 THER/PROPH/DIAG INJ SC/IM: CPT

## 2023-10-18 PROCEDURE — 6360000002 HC RX W HCPCS

## 2023-10-18 PROCEDURE — 99284 EMERGENCY DEPT VISIT MOD MDM: CPT

## 2023-10-18 RX ORDER — KETOROLAC TROMETHAMINE 15 MG/ML
15 INJECTION, SOLUTION INTRAMUSCULAR; INTRAVENOUS ONCE
Status: COMPLETED | OUTPATIENT
Start: 2023-10-18 | End: 2023-10-18

## 2023-10-18 RX ORDER — ONDANSETRON 4 MG/1
4 TABLET, ORALLY DISINTEGRATING ORAL ONCE
Status: COMPLETED | OUTPATIENT
Start: 2023-10-18 | End: 2023-10-18

## 2023-10-18 RX ORDER — CLINDAMYCIN HYDROCHLORIDE 150 MG/1
150 CAPSULE ORAL ONCE
Status: COMPLETED | OUTPATIENT
Start: 2023-10-18 | End: 2023-10-18

## 2023-10-18 RX ORDER — CLINDAMYCIN HYDROCHLORIDE 300 MG/1
300 CAPSULE ORAL 3 TIMES DAILY
Qty: 21 CAPSULE | Refills: 0 | Status: SHIPPED | OUTPATIENT
Start: 2023-10-18 | End: 2023-10-25

## 2023-10-18 RX ADMIN — ONDANSETRON 4 MG: 4 TABLET, ORALLY DISINTEGRATING ORAL at 18:55

## 2023-10-18 RX ADMIN — KETOROLAC TROMETHAMINE 15 MG: 15 INJECTION, SOLUTION INTRAMUSCULAR; INTRAVENOUS at 18:55

## 2023-10-18 RX ADMIN — CLINDAMYCIN HYDROCHLORIDE 150 MG: 150 CAPSULE ORAL at 18:32

## 2023-10-18 ASSESSMENT — PAIN SCALES - GENERAL
PAINLEVEL_OUTOF10: 6
PAINLEVEL_OUTOF10: 6

## 2023-10-18 ASSESSMENT — LIFESTYLE VARIABLES
HOW MANY STANDARD DRINKS CONTAINING ALCOHOL DO YOU HAVE ON A TYPICAL DAY: PATIENT DOES NOT DRINK
HOW OFTEN DO YOU HAVE A DRINK CONTAINING ALCOHOL: NEVER

## 2023-10-18 ASSESSMENT — PAIN - FUNCTIONAL ASSESSMENT: PAIN_FUNCTIONAL_ASSESSMENT: 0-10

## 2023-10-18 NOTE — ED PROVIDER NOTES
[address] [place] [address] ED  Emergency Department Encounter  Emergency Medicine Resident     Pt Surinder Romo  MRN: [id number]  [address] Alicia 1970  Date of evaluation: 10/18/23  PCP:  Ev Fernando MD  Note Started: 5:29 PM EDT      CHIEF COMPLAINT       Chief Complaint   Patient presents with    Ear Drainage       HISTORY OF PRESENT ILLNESS  (Location/Symptom, Timing/Onset, Context/Setting, Quality, Duration, Modifying Factors, Severity.)      Rosario Reyes is a 52 y.o. female who presents with bleeding from her right ear that started earlier today. Patient reports having chronic sinus and ear infections. Has had tympanostomy tubes in both ears twice and believes she may still have the tubes in her ears but is unsure. Says she was at a store and noticed some clear fluid coming from her ear. Wiped it away. 10 minutes later noticed an electrical fluid from her ear and wiped it away and realized it was blood. Denies any trauma to her ear. Denies ear pain today, but says now that she is having blood from her ear, feels like her hearing has decreased in her right ear. She is having some mild dizziness but was not significantly different than her normal baseline symptoms. Denies significant headache or vision changes that are not different from her baseline. Denies cough, sore throat, runny nose, but does feel congested like she usually does. Denies recent fever or chills. Otherwise feels her baseline and is not sure what happened to her ear. Has not tried to put anything in it other than a cottonball to catch the drainage. PAST MEDICAL / SURGICAL / SOCIAL / FAMILY HISTORY      has a past medical history of Asthma, Depression, ESBL (extended spectrum beta-lactamase) producing bacteria infection, Fibromyalgia, Gastroparesis, Headache, Hyperlipidemia, Hypertension, Neuropathy, Osteoarthritis, Seizure ([address]), and Tennis elbow.      has a past surgical history that includes Hysterectomy; Breast

## 2023-10-18 NOTE — DISCHARGE INSTRUCTIONS
You were seen and evaluated emergency department for bleeding out of your right ear. You were found to have a ruptured tympanic membrane. This will resolve over time. You were started on a course of antibiotics to prevent infection as this heals. You should take this until completion. Please follow-up with your primary care provider in 1 week to ensure that your symptoms are resolving. Please follow the attached instructions to assist with your healing process and pain control. See attached instructions. Return to the emergency department if you have any symptoms indicating immediate medical care.

## 2023-11-17 DIAGNOSIS — R56.9 SEIZURE (HCC): ICD-10-CM

## 2023-11-20 RX ORDER — LAMOTRIGINE 100 MG/1
100 TABLET ORAL 2 TIMES DAILY
Qty: 60 TABLET | Refills: 5 | Status: SHIPPED | OUTPATIENT
Start: 2023-11-20 | End: 2024-05-18

## 2023-11-20 NOTE — TELEPHONE ENCOUNTER
Pharmacy requesting refill of Lamcital.      Medication active on med list yes      Date of last fill: 10/11/23  verified on 11/20/2023   verified by [facility] LPN      Date of last appointment 6/29/23    Next Visit Date:  2/1/2024

## 2023-12-04 ENCOUNTER — APPOINTMENT (OUTPATIENT)
Dept: GENERAL RADIOLOGY | Age: 53
End: 2023-12-04

## 2023-12-04 ENCOUNTER — HOSPITAL ENCOUNTER (EMERGENCY)
Age: 53
Discharge: HOME | End: 2023-12-04
Attending: EMERGENCY MEDICINE

## 2023-12-04 ENCOUNTER — APPOINTMENT (OUTPATIENT)
Dept: VASCULAR LAB | Age: 53
End: 2023-12-04

## 2023-12-04 VITALS
HEART RATE: 75 BPM | TEMPERATURE: 97.9 F | RESPIRATION RATE: 17 BRPM | SYSTOLIC BLOOD PRESSURE: 145 MMHG | BODY MASS INDEX: 42.59 KG/M2 | DIASTOLIC BLOOD PRESSURE: 84 MMHG | OXYGEN SATURATION: 98 % | WEIGHT: 281 LBS | HEIGHT: 68 IN

## 2023-12-04 DIAGNOSIS — R06.02 SHORTNESS OF BREATH: ICD-10-CM

## 2023-12-04 DIAGNOSIS — R07.9 CHEST PAIN, UNSPECIFIED TYPE: ICD-10-CM

## 2023-12-04 DIAGNOSIS — M79.89 SWELLING OF BOTH LOWER EXTREMITIES: Primary | ICD-10-CM

## 2023-12-04 LAB
ANION GAP SERPL CALCULATED.3IONS-SCNC: 13 MMOL/L (ref 9–17)
BASOPHILS # BLD: 0.03 K/UL (ref 0–0.2)
BASOPHILS NFR BLD: 0 % (ref 0–2)
BNP SERPL-MCNC: 498 PG/ML
BUN SERPL-MCNC: 14 MG/DL (ref 6–20)
CALCIUM SERPL-MCNC: 9.3 MG/DL (ref 8.6–10.4)
CHLORIDE SERPL-SCNC: 103 MMOL/L (ref 98–107)
CO2 SERPL-SCNC: 22 MMOL/L (ref 20–31)
CREAT SERPL-MCNC: 0.6 MG/DL (ref 0.5–0.9)
EOSINOPHIL # BLD: 0.19 K/UL (ref 0–0.44)
EOSINOPHILS RELATIVE PERCENT: 3 % (ref 1–4)
ERYTHROCYTE [DISTWIDTH] IN BLOOD BY AUTOMATED COUNT: 16.1 % (ref 11.8–14.4)
GFR SERPL CREATININE-BSD FRML MDRD: >60 ML/MIN/1.73M2
GLUCOSE SERPL-MCNC: 87 MG/DL (ref 70–99)
HCT VFR BLD AUTO: 36.4 % (ref 36.3–47.1)
HGB BLD-MCNC: 11.1 G/DL (ref 11.9–15.1)
IMM GRANULOCYTES # BLD AUTO: <0.03 K/UL (ref 0–0.3)
IMM GRANULOCYTES NFR BLD: 0 %
LYMPHOCYTES NFR BLD: 2.17 K/UL (ref 1.1–3.7)
LYMPHOCYTES RELATIVE PERCENT: 29 % (ref 24–43)
MCH RBC QN AUTO: 22.5 PG (ref 25.2–33.5)
MCHC RBC AUTO-ENTMCNC: 30.5 G/DL (ref 28.4–34.8)
MCV RBC AUTO: 73.7 FL (ref 82.6–102.9)
MONOCYTES NFR BLD: 0.44 K/UL (ref 0.1–1.2)
MONOCYTES NFR BLD: 6 % (ref 3–12)
NEUTROPHILS NFR BLD: 62 % (ref 36–65)
NEUTS SEG NFR BLD: 4.55 K/UL (ref 1.5–8.1)
NRBC BLD-RTO: 0 PER 100 WBC
PLATELET # BLD AUTO: 216 K/UL (ref 138–453)
PMV BLD AUTO: 10.6 FL (ref 8.1–13.5)
POTASSIUM SERPL-SCNC: 4 MMOL/L (ref 3.7–5.3)
RBC # BLD AUTO: 4.94 M/UL (ref 3.95–5.11)
RBC # BLD: ABNORMAL 10*6/UL
SODIUM SERPL-SCNC: 138 MMOL/L (ref 135–144)
TROPONIN I SERPL HS-MCNC: 6 NG/L (ref 0–14)
TROPONIN I SERPL HS-MCNC: <6 NG/L (ref 0–14)
WBC OTHER # BLD: 7.4 K/UL (ref 3.5–11.3)

## 2023-12-04 PROCEDURE — 6360000002 HC RX W HCPCS: Performed by: STUDENT IN AN ORGANIZED HEALTH CARE EDUCATION/TRAINING PROGRAM

## 2023-12-04 PROCEDURE — 84484 ASSAY OF TROPONIN QUANT: CPT

## 2023-12-04 PROCEDURE — 99285 EMERGENCY DEPT VISIT HI MDM: CPT

## 2023-12-04 PROCEDURE — 93005 ELECTROCARDIOGRAM TRACING: CPT | Performed by: STUDENT IN AN ORGANIZED HEALTH CARE EDUCATION/TRAINING PROGRAM

## 2023-12-04 PROCEDURE — 85025 COMPLETE CBC W/AUTO DIFF WBC: CPT

## 2023-12-04 PROCEDURE — 71045 X-RAY EXAM CHEST 1 VIEW: CPT

## 2023-12-04 PROCEDURE — 93970 EXTREMITY STUDY: CPT

## 2023-12-04 PROCEDURE — 96374 THER/PROPH/DIAG INJ IV PUSH: CPT

## 2023-12-04 PROCEDURE — 83880 ASSAY OF NATRIURETIC PEPTIDE: CPT

## 2023-12-04 PROCEDURE — 80048 BASIC METABOLIC PNL TOTAL CA: CPT

## 2023-12-04 RX ORDER — KETOROLAC TROMETHAMINE 15 MG/ML
15 INJECTION, SOLUTION INTRAMUSCULAR; INTRAVENOUS ONCE
Status: COMPLETED | OUTPATIENT
Start: 2023-12-04 | End: 2023-12-04

## 2023-12-04 RX ADMIN — KETOROLAC TROMETHAMINE 15 MG: 15 INJECTION, SOLUTION INTRAMUSCULAR; INTRAVENOUS at 21:25

## 2023-12-04 ASSESSMENT — PAIN SCALES - GENERAL
PAINLEVEL_OUTOF10: 6
PAINLEVEL_OUTOF10: 7

## 2023-12-04 ASSESSMENT — PAIN - FUNCTIONAL ASSESSMENT: PAIN_FUNCTIONAL_ASSESSMENT: 0-10

## 2023-12-04 ASSESSMENT — PAIN DESCRIPTION - LOCATION: LOCATION: BACK

## 2023-12-04 NOTE — ED TRIAGE NOTES
Pt arriving to ED 09 via triage  CO of dyspnea with exertion x 2 days  CO of ankle edema, no edema noticed but tenderness to L. Ankle  Co of chest soreness  HX of ICU admit for pulmonary edema  Pt placed on continuous cardiac monitoring, BP, Pulse ox. EKG obtained. IV established and labs drawn. Pt is resting on stretcher with call light within reach. Breathing is non labored and no acute distress is noted.    Will continue to follow plan of care

## 2023-12-05 LAB — ECHO BSA: 2.47 M2

## 2023-12-05 PROCEDURE — 93970 EXTREMITY STUDY: CPT | Performed by: SURGERY

## 2023-12-05 NOTE — DISCHARGE INSTRUCTIONS
You were evaluated in the emergency department for chest pain, shortness of breath, bilateral leg pain and swelling. Your cardiac work-up did not show any acute abnormalities. Your chest x-ray did not show any fluid on the lungs. Ultrasounds of your legs did not reveal any blood clots. Please follow-up with your primary care physician. Please return to the emergency department for any worsening symptoms, questions or concerns.

## 2023-12-06 LAB
EKG ATRIAL RATE: 74 BPM
EKG P AXIS: 38 DEGREES
EKG P-R INTERVAL: 166 MS
EKG Q-T INTERVAL: 414 MS
EKG QRS DURATION: 78 MS
EKG QTC CALCULATION (BAZETT): 459 MS
EKG R AXIS: 50 DEGREES
EKG T AXIS: 30 DEGREES
EKG VENTRICULAR RATE: 74 BPM

## 2023-12-06 PROCEDURE — 93010 ELECTROCARDIOGRAM REPORT: CPT | Performed by: INTERNAL MEDICINE

## 2023-12-29 RX ORDER — AMLODIPINE BESYLATE 5 MG/1
5 TABLET ORAL DAILY
Qty: 30 TABLET | Refills: 2 | OUTPATIENT
Start: 2023-12-29

## 2024-02-01 ENCOUNTER — OFFICE VISIT (OUTPATIENT)
Dept: NEUROLOGY | Age: 54
End: 2024-02-01

## 2024-02-01 VITALS
SYSTOLIC BLOOD PRESSURE: 136 MMHG | DIASTOLIC BLOOD PRESSURE: 88 MMHG | HEART RATE: 84 BPM | HEIGHT: 68 IN | WEIGHT: 272 LBS | BODY MASS INDEX: 41.22 KG/M2

## 2024-02-01 DIAGNOSIS — G43.411 INTRACTABLE HEMIPLEGIC MIGRAINE WITH STATUS MIGRAINOSUS: Primary | ICD-10-CM

## 2024-02-01 DIAGNOSIS — R56.9 SEIZURE (HCC): ICD-10-CM

## 2024-02-01 DIAGNOSIS — G40.109 FOCAL EPILEPSY (HCC): ICD-10-CM

## 2024-02-01 DIAGNOSIS — M54.12 CERVICAL RADICULOPATHY: ICD-10-CM

## 2024-02-01 RX ORDER — AMITRIPTYLINE HYDROCHLORIDE 25 MG/1
25 TABLET, FILM COATED ORAL NIGHTLY
Qty: 90 TABLET | Refills: 1 | Status: SHIPPED | OUTPATIENT
Start: 2024-02-01

## 2024-05-16 ENCOUNTER — TELEPHONE (OUTPATIENT)
Dept: NEUROLOGY | Age: 54
End: 2024-05-16

## 2024-05-16 NOTE — TELEPHONE ENCOUNTER
05 16 2024 called patient times 2 (05 02 2024 left message on machine and 05 09 2024 the patient was not accepting calls at this time at )  to schedule follow up appointment with Dr. Reaves, left message on machine both times, no response.  I mailed the patient a letter asking them to call the office back to schedule this appointment.  KS

## 2024-05-18 ENCOUNTER — HOSPITAL ENCOUNTER (EMERGENCY)
Age: 54
Discharge: HOME | End: 2024-05-18
Attending: EMERGENCY MEDICINE

## 2024-05-18 VITALS
DIASTOLIC BLOOD PRESSURE: 88 MMHG | SYSTOLIC BLOOD PRESSURE: 175 MMHG | HEART RATE: 85 BPM | RESPIRATION RATE: 17 BRPM | TEMPERATURE: 98 F | OXYGEN SATURATION: 99 %

## 2024-05-18 DIAGNOSIS — M25.511 RIGHT SHOULDER PAIN, UNSPECIFIED CHRONICITY: Primary | ICD-10-CM

## 2024-05-18 PROCEDURE — 6370000000 HC RX 637 (ALT 250 FOR IP)

## 2024-05-18 PROCEDURE — 99283 EMERGENCY DEPT VISIT LOW MDM: CPT

## 2024-05-18 RX ORDER — GABAPENTIN 300 MG/1
300 CAPSULE ORAL 3 TIMES DAILY
Qty: 45 CAPSULE | Refills: 0 | Status: SHIPPED | OUTPATIENT
Start: 2024-05-18 | End: 2024-06-02

## 2024-05-18 RX ORDER — GABAPENTIN 300 MG/1
300 CAPSULE ORAL ONCE
Status: COMPLETED | OUTPATIENT
Start: 2024-05-18 | End: 2024-05-18

## 2024-05-18 RX ADMIN — GABAPENTIN 300 MG: 300 CAPSULE ORAL at 16:25

## 2024-05-18 RX ADMIN — DICLOFENAC SODIUM 2 G: 10 GEL TOPICAL at 16:58

## 2024-05-18 NOTE — ED PROVIDER NOTES
tenderness.      Comments: Tenderness to palpation in the right shoulder specifically in the posterior scapula and posterior aspect of the shoulder.  Patient with slightly decreased strength in the right hand with decree strength grasping.  Sensation intact in bilateral upper extremities.   Skin:     General: Skin is warm and dry.      Capillary Refill: Capillary refill takes less than 2 seconds.      Findings: No erythema or rash.   Neurological:      Mental Status: She is alert and oriented to person, place, and time.      Comments: 4/5 motor strength in the upper extremity secondary to pain both with flexion and extension.  Sensation intact.  Radial pulses 2+.  Capillary refill less than 2 seconds.           DDX/DIAGNOSTIC RESULTS / EMERGENCY DEPARTMENT COURSE / MDM     Medical Decision Making  54-year-old female with significant past medical history including spinal stenosis fibromyalgia seizures.  Has had ongoing right shoulder pain for the past 2 months that is chronic.  Radiating down her arm with some numbness and tingling in the hand.  Patient does follow with a neurologist and has had previous MRIs and CTs.  Was referred to pain management for her pain symptoms.  Patient is limited in what she can take in terms of pain meds as most things upset her stomach.  Or bother her skin.  She does have a history of gastroparesis that limits which she can take orally.  There is no signs of saddle anesthesia or loss of bowel or bladder control.  No history of IV drug use and patient denies fevers or chills.  On physical exam vital signs are stable patient is afebrile.  Motor strength slightly decreased in the right hand which may be associated to pain in the shoulder.  There is tenderness to the across the scapula as well as posterior shoulder.  No tenderness to palpation of the cervical or thoracic spine.  Will plan for a symptomatic management with lidocaine patch, Voltaren and gabapentin as well as ice packs with  mis-transcribed.)

## 2024-05-18 NOTE — DISCHARGE INSTRUCTIONS
You were seen in the emergency room for right shoulder pain with pain radiating down the right arm.  You have follow-up appointments booked with pain clinic as well as neurologist.  Your symptoms slightly improved with ice as well as Voltaren cream and gabapentin.  You are being discharged with new prescription for gabapentin and Voltaren and to please follow-up with your primary care physician and neurologist and pain clinic for further recommendations management.    Please return to the emergency room should you experiencing chest pain, shortness of breath, worsening of symptoms new symptoms or concerns.

## 2024-05-18 NOTE — ED PROVIDER NOTES
[facility]     Emergency Department     Faculty Note/ Attestation      Pt Name: Lyndsey Gore                                       MRN: [id number]  Birthdate 1970  Date of evaluation: 5/18/2024    Patients PCP:    Lay Pollard MD    Note Started: 3:37 PM EDT      Attestation  I performed a history and physical examination of the patient and discussed management with the resident. I reviewed the resident’s note and agree with the documented findings and plan of care. Any areas of disagreement are noted on the chart. I was personally present for the key portions of any procedures. I have documented in the chart those procedures where I was not present during the key portions. I have reviewed the emergency nurses triage note. I agree with the chief complaint, past medical history, past surgical history, allergies, medications, social and family history as documented unless otherwise noted below.    For Physician Assistant/ Nurse Practitioner cases/documentation I have personally evaluated this patient and have completed at least one if not all key elements of the E/M (history, physical exam, and MDM). Additional findings are as noted.      Initial Screens:             Vitals:    Vitals:    05/18/24 1519 05/18/24 1520   BP: (!) 175/88    Resp: 17    Temp:  98 °F (36.7 °C)       CHIEF COMPLAINT       Chief Complaint   Patient presents with    Neck Pain    Arm Pain     Left going down to finger tips x1 month              DIAGNOSTIC RESULTS             RADIOLOGY:   No orders to display         LABS:  Labs Reviewed - No data to display      EMERGENCY DEPARTMENT COURSE:     -------------------------  BP: (!) 175/88, Temp: 98 °F (36.7 °C),  , Respirations: 17      Comments    Hx of fibro, gastroparesis, sz?  2 months of RUE pain/wkness from shoulder down, also has shoulder tenderness  States cannot take OTC or narcotic pain meds   Not taking muscle relaxers d/t drowsiness  No saddle/cauda  sx    Patient has isolated muscular shoulder and arm pain, is allergic to many meds, she is declining most of our treatment options, was amenable to mild symptomatic treatment, has pain management appointment as well as other follow-up this week, will discharge with supportive care and strong recommendation to follow-up with PCP and her specialist    (Please note that portions of this note were completed with a voice recognition program.  Efforts were made to edit the dictations but occasionally words are mis-transcribed.)      Kirill Rhodes MD,, MD  Attending Emergency Physician          Kirill Rhodes MD  05/19/24 0058

## 2024-05-30 ENCOUNTER — HOSPITAL ENCOUNTER (OUTPATIENT)
Dept: PAIN MANAGEMENT | Age: 54
Discharge: HOME | End: 2024-05-30

## 2024-05-30 VITALS — WEIGHT: 282 LBS | HEIGHT: 68 IN | BODY MASS INDEX: 42.74 KG/M2

## 2024-05-30 DIAGNOSIS — M48.02 CERVICAL SPINAL STENOSIS: Primary | ICD-10-CM

## 2024-05-30 DIAGNOSIS — M47.812 CERVICAL SPONDYLOSIS: ICD-10-CM

## 2024-05-30 PROCEDURE — 99244 OFF/OP CNSLTJ NEW/EST MOD 40: CPT | Performed by: ANESTHESIOLOGY

## 2024-05-30 PROCEDURE — 99203 OFFICE O/P NEW LOW 30 MIN: CPT

## 2024-05-30 ASSESSMENT — PAIN DESCRIPTION - PAIN TYPE: TYPE: CHRONIC PAIN

## 2024-05-30 ASSESSMENT — ENCOUNTER SYMPTOMS
DIARRHEA: 0
BACK PAIN: 1
CONSTIPATION: 0
VOMITING: 0
GASTROINTESTINAL NEGATIVE: 1
EYE PAIN: 0
NAUSEA: 0
RESPIRATORY NEGATIVE: 1
SORE THROAT: 0
CHEST TIGHTNESS: 0
SHORTNESS OF BREATH: 0
EYES NEGATIVE: 1
ALLERGIC/IMMUNOLOGIC NEGATIVE: 1

## 2024-05-30 ASSESSMENT — PAIN DESCRIPTION - LOCATION: LOCATION: NECK

## 2024-05-30 ASSESSMENT — PAIN DESCRIPTION - ORIENTATION: ORIENTATION: MID

## 2024-05-30 ASSESSMENT — PAIN SCALES - GENERAL: PAINLEVEL_OUTOF10: 8

## 2024-05-30 ASSESSMENT — PAIN DESCRIPTION - DESCRIPTORS: DESCRIPTORS: ACHING

## 2024-05-30 ASSESSMENT — PAIN DESCRIPTION - FREQUENCY: FREQUENCY: CONTINUOUS

## 2024-05-30 NOTE — PROGRESS NOTES
conduction study January 2017 left upper extremity which was normal.     1. Cervical spinal stenosis    2. Cervical spondylosis        Pleasant 54-year-old female with past medical history significant for obesity, BMI 42, history of epilepsy  Referred by neurology clinic Dr. 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 in past which caused her facial twitching    She have done physical therapy have tried NSAIDs and muscle relaxant  Neck pain aggravates with neck movement  Pain interferes with quality of life going on for more than 1 year  She had recent MRI cervical spine  Report was reviewed  Severe right-sided foraminal narrowing at C3 level  Symptoms seems to correlate with imaging finding  Discussed cervical epidural steroid injection  Patient is agreeable  If this fails consider for right-sided cervical diagnostic medial branch nerve block    Consultation note sent to the referring physician    Orders Placed This Encounter   Procedures    MA NJX DX/THER SBST INTRLMNR CRV/THRC W/IMG GDN      No orders of the defined types were placed in this encounter.           Electronically signed by Jaden Nash MD on 5/30/2024 at 2:27 PM

## 2024-05-30 NOTE — H&P (VIEW-ONLY)
The patient is a 54 y.o.Non- / non  female.    Chief Complaint   Patient presents with    Neck Pain    New Patient    Back Pain        Pain History  Pain score today: 8  1. Location: Cervical Spine     2. Radiation: Down the spine, and across   3. Character: hot, pins needles, burning    5. Duration: Constant   6. Onset: 20+ years   7. Did an injury cause pain: no   8. Aggravating factors: sneezing, laying, going from laying to getting up, anything triggers the pain   9. Alleviating factors: has tried, heating pad  10. Associated symptoms (numbness / tingling / weakness): Numbness/weakness    -Where at: Right hand   -Down into finger tips or toes (specify which finger or toes): all 5 fingers   -constant or intermitting:  comes and goes   11. Red Flags: (weight loss / chills / loss of bladder or bowel control): no      Previous management history  1. Previous diagnostic workup: (Imaging/EMG)   CT, MRI, or Xray: MRI  What part of the body: Cervical Spine   What facility did they have it at: [facility]  What year or specific date: 08/25/2023  EMG:  Yes      2. Previous non interventional treatments tried:  chiropractor or physical therapy: PT   What part of the body:Right Arm   What facility was it done at: [facility]   How long ago was it last tried: in 2017  Did it work: no   Did they complete it: PT cut physical therapy short      3. Previous Medications tried  NSAID's: unable to take   Neurontin: yes- made them swell up   Lyrica: yes   Trycyclic antidepressant (Ellavil / Pamelor ): No   Cymbalta: no   Opioids (Ultram / Vicodin / Percocet / Morphine / Dilaudid / Oramorph/ Fentanyl etc.): unable to take   Last Pain medication taken (name of med and date): no, smokes marijuana      4. Previous Interventional pain procedures tried:  What kind of injection: did have shots unable to remember what kind   Who did the injection:  unable to remember was a [facility] Doctor   did the injection help: no    Last time injection

## 2024-06-05 ENCOUNTER — HOSPITAL ENCOUNTER (OUTPATIENT)
Dept: PAIN MANAGEMENT | Facility: CLINIC | Age: 54
Discharge: HOME | End: 2024-06-05

## 2024-06-05 VITALS
RESPIRATION RATE: 13 BRPM | BODY MASS INDEX: 43.04 KG/M2 | OXYGEN SATURATION: 99 % | DIASTOLIC BLOOD PRESSURE: 80 MMHG | HEART RATE: 72 BPM | TEMPERATURE: 97.7 F | WEIGHT: 284 LBS | HEIGHT: 68 IN | SYSTOLIC BLOOD PRESSURE: 142 MMHG

## 2024-06-05 DIAGNOSIS — R52 PAIN MANAGEMENT: ICD-10-CM

## 2024-06-05 DIAGNOSIS — M48.02 CERVICAL SPINAL STENOSIS: Primary | ICD-10-CM

## 2024-06-05 PROCEDURE — 2580000003 HC RX 258: Performed by: ANESTHESIOLOGY

## 2024-06-05 PROCEDURE — 62321 NJX INTERLAMINAR CRV/THRC: CPT | Performed by: ANESTHESIOLOGY

## 2024-06-05 PROCEDURE — 2500000003 HC RX 250 WO HCPCS: Performed by: ANESTHESIOLOGY

## 2024-06-05 PROCEDURE — 6360000002 HC RX W HCPCS: Performed by: ANESTHESIOLOGY

## 2024-06-05 PROCEDURE — 99152 MOD SED SAME PHYS/QHP 5/>YRS: CPT | Performed by: ANESTHESIOLOGY

## 2024-06-05 PROCEDURE — 62321 NJX INTERLAMINAR CRV/THRC: CPT

## 2024-06-05 PROCEDURE — 6360000004 HC RX CONTRAST MEDICATION: Performed by: ANESTHESIOLOGY

## 2024-06-05 RX ORDER — SODIUM CHLORIDE 0.9 % (FLUSH) 0.9 %
SYRINGE (ML) INJECTION
Status: COMPLETED | OUTPATIENT
Start: 2024-06-05 | End: 2024-06-05

## 2024-06-05 RX ORDER — LIDOCAINE HYDROCHLORIDE 10 MG/ML
INJECTION, SOLUTION EPIDURAL; INFILTRATION; INTRACAUDAL; PERINEURAL
Status: COMPLETED | OUTPATIENT
Start: 2024-06-05 | End: 2024-06-05

## 2024-06-05 RX ORDER — FENTANYL CITRATE 50 UG/ML
INJECTION, SOLUTION INTRAMUSCULAR; INTRAVENOUS
Status: COMPLETED | OUTPATIENT
Start: 2024-06-05 | End: 2024-06-05

## 2024-06-05 RX ORDER — DEXAMETHASONE SODIUM PHOSPHATE 10 MG/ML
INJECTION, SOLUTION INTRAMUSCULAR; INTRAVENOUS
Status: COMPLETED | OUTPATIENT
Start: 2024-06-05 | End: 2024-06-05

## 2024-06-05 RX ORDER — 0.9 % SODIUM CHLORIDE 0.9 %
INTRAVENOUS SOLUTION INTRAVENOUS CONTINUOUS PRN
Status: COMPLETED | OUTPATIENT
Start: 2024-06-05 | End: 2024-06-05

## 2024-06-05 RX ORDER — MIDAZOLAM HYDROCHLORIDE 2 MG/2ML
INJECTION, SOLUTION INTRAMUSCULAR; INTRAVENOUS
Status: COMPLETED | OUTPATIENT
Start: 2024-06-05 | End: 2024-06-05

## 2024-06-05 RX ADMIN — LIDOCAINE HYDROCHLORIDE 5 ML: 10 INJECTION, SOLUTION EPIDURAL; INFILTRATION; INTRACAUDAL at 08:47

## 2024-06-05 RX ADMIN — MIDAZOLAM HYDROCHLORIDE 1 MG: 1 INJECTION, SOLUTION INTRAMUSCULAR; INTRAVENOUS at 08:47

## 2024-06-05 RX ADMIN — DEXAMETHASONE SODIUM PHOSPHATE 10 MG: 10 INJECTION, SOLUTION INTRAMUSCULAR; INTRAVENOUS at 08:50

## 2024-06-05 RX ADMIN — IOHEXOL 3 ML: 180 INJECTION INTRAVENOUS at 08:49

## 2024-06-05 RX ADMIN — Medication 5 ML: at 08:49

## 2024-06-05 RX ADMIN — FENTANYL CITRATE 50 MCG: 50 INJECTION, SOLUTION INTRAMUSCULAR; INTRAVENOUS at 08:47

## 2024-06-05 RX ADMIN — SODIUM CHLORIDE 500 ML: 0.9 INJECTION, SOLUTION INTRAVENOUS at 08:47

## 2024-06-05 ASSESSMENT — PAIN SCALES - GENERAL
PAINLEVEL_OUTOF10: 7
PAINLEVEL_OUTOF10: 9

## 2024-06-05 ASSESSMENT — PAIN - FUNCTIONAL ASSESSMENT
PAIN_FUNCTIONAL_ASSESSMENT: PREVENTS OR INTERFERES SOME ACTIVE ACTIVITIES AND ADLS
PAIN_FUNCTIONAL_ASSESSMENT: 0-10

## 2024-06-05 ASSESSMENT — PAIN DESCRIPTION - DESCRIPTORS: DESCRIPTORS: ACHING;BURNING

## 2024-06-05 NOTE — INTERVAL H&P NOTE
Update History & Physical    The patient's History and Physical of May 30, 2024 was reviewed with the patient and I examined the patient. There was no change. The surgical site was confirmed by the patient and me.     Plan: The risks, benefits, expected outcome, and alternative to the recommended procedure have been discussed with the patient. Patient understands and wants to proceed with the procedure.     ASA 3  MP 3    Electronically signed by Jaden Nash MD on 6/5/2024 at 8:41 AM

## 2024-06-05 NOTE — DISCHARGE INSTRUCTIONS

## 2024-07-11 ENCOUNTER — HOSPITAL ENCOUNTER (OUTPATIENT)
Dept: PAIN MANAGEMENT | Age: 54
Discharge: HOME | End: 2024-07-11

## 2024-07-11 VITALS — HEIGHT: 68 IN | WEIGHT: 284 LBS | BODY MASS INDEX: 43.04 KG/M2

## 2024-07-11 DIAGNOSIS — M48.02 CERVICAL SPINAL STENOSIS: ICD-10-CM

## 2024-07-11 DIAGNOSIS — M47.812 CERVICAL SPONDYLOSIS: Primary | ICD-10-CM

## 2024-07-11 DIAGNOSIS — G89.29 CHRONIC BILATERAL LOW BACK PAIN WITH LEFT-SIDED SCIATICA: ICD-10-CM

## 2024-07-11 DIAGNOSIS — M54.42 CHRONIC BILATERAL LOW BACK PAIN WITH LEFT-SIDED SCIATICA: ICD-10-CM

## 2024-07-11 PROCEDURE — 99214 OFFICE O/P EST MOD 30 MIN: CPT | Performed by: ANESTHESIOLOGY

## 2024-07-11 PROCEDURE — 99213 OFFICE O/P EST LOW 20 MIN: CPT

## 2024-07-11 ASSESSMENT — PAIN SCALES - GENERAL: PAINLEVEL_OUTOF10: 6

## 2024-07-11 ASSESSMENT — ENCOUNTER SYMPTOMS
RESPIRATORY NEGATIVE: 1
EYES NEGATIVE: 1

## 2024-07-11 NOTE — PROGRESS NOTES
The patient is a 54 y.o.Non- / non  female.    Chief Complaint   Patient presents with    Neck Pain        HPI  Neck pain  Chronic going on for more than 1 year located over the right side of the axial cervical spine with radiating down right arm  Recently had epidural injection  Report improvement in the right arm pain overall 50% improvement with improved range of motion    Index pain today is right-sided axial cervical spinal pain that is associated with occipital headaches  Onset of symptom more than 1 year ago pain aggravates with neck movement  She has tried rest NSAIDs physical therapy with limited benefit  Clinical presentation suggest facet mediated pain  Tenderness to palpation over right-sided cervical facet in the upper cervical spine  Neck disability index to moderate disability associated with neck pain  Discussed diagnostic right-sided cervical medial branch nerve block at C2-3 and C3-4 facet with fluoroscopy guidance  If this provide 80% plus relief and then consider for confirmatory block and radiofrequency ablation    Patient is also complaining of low back pain with left-sided sciatica going on for several years progressively worsening  Has tried NSAIDs and physical therapy in past  No previous lumbar spine injection history  No recent lumbar spine injection  Last diagnostic workup with MRI was more than 5 years ago  I think updated imaging for lumbar spine is warranted  Will obtain MRI lumbar spine  Will plan for appropriate interventional procedure surgical referral after review of the imaging      Outcome   Any improvement of activity?  Yes   Any side effects (appetite,leg cramping,facial fleshing): no   Increase of pain:     Pain score Today:  6  % of pain relief: about 40% pan relief  Pain diary (medial branch block):      Hemoglobin A1C   Date Value Ref Range Status   04/28/2022 5.2 4.0 - 6.0 % Final         Past Medical History:   Diagnosis Date    Asthma     Depression

## 2024-08-08 ENCOUNTER — TELEPHONE (OUTPATIENT)
Dept: NEUROLOGY | Age: 54
End: 2024-08-08

## 2024-08-08 NOTE — TELEPHONE ENCOUNTER
08/08/24  Patient called to schedule appt w/Dr Reaves, advised pt I will place her on the waitlist as Dr Reaves booked up until the beginning of next yr.    Patient stated she needs help w/utilites and will have form faxed over.  Advised pt I will give to Clinical staff.  However she has not seen Dr Reaves recently.  KB

## 2024-08-13 ENCOUNTER — HOSPITAL ENCOUNTER (OUTPATIENT)
Dept: MRI IMAGING | Age: 54
Discharge: HOME | End: 2024-08-15
Attending: ANESTHESIOLOGY

## 2024-08-13 DIAGNOSIS — G89.29 CHRONIC BILATERAL LOW BACK PAIN WITH LEFT-SIDED SCIATICA: ICD-10-CM

## 2024-08-13 DIAGNOSIS — M54.42 CHRONIC BILATERAL LOW BACK PAIN WITH LEFT-SIDED SCIATICA: ICD-10-CM

## 2024-08-13 PROCEDURE — 72148 MRI LUMBAR SPINE W/O DYE: CPT

## 2024-08-14 ENCOUNTER — HOSPITAL ENCOUNTER (OUTPATIENT)
Dept: PAIN MANAGEMENT | Facility: CLINIC | Age: 54
Discharge: HOME | End: 2024-08-14

## 2024-08-14 VITALS
OXYGEN SATURATION: 99 % | SYSTOLIC BLOOD PRESSURE: 123 MMHG | TEMPERATURE: 97.6 F | HEART RATE: 78 BPM | DIASTOLIC BLOOD PRESSURE: 66 MMHG | BODY MASS INDEX: 41.83 KG/M2 | WEIGHT: 276 LBS | HEIGHT: 68 IN | RESPIRATION RATE: 14 BRPM

## 2024-08-14 DIAGNOSIS — R52 PAIN MANAGEMENT: ICD-10-CM

## 2024-08-14 DIAGNOSIS — M47.812 CERVICAL SPONDYLOSIS: Primary | ICD-10-CM

## 2024-08-14 PROCEDURE — 2580000003 HC RX 258: Performed by: ANESTHESIOLOGY

## 2024-08-14 PROCEDURE — 6360000004 HC RX CONTRAST MEDICATION: Performed by: ANESTHESIOLOGY

## 2024-08-14 PROCEDURE — 64491 INJ PARAVERT F JNT C/T 2 LEV: CPT

## 2024-08-14 PROCEDURE — 2500000003 HC RX 250 WO HCPCS: Performed by: ANESTHESIOLOGY

## 2024-08-14 PROCEDURE — 6360000002 HC RX W HCPCS: Performed by: ANESTHESIOLOGY

## 2024-08-14 PROCEDURE — 64490 INJ PARAVERT F JNT C/T 1 LEV: CPT

## 2024-08-14 RX ORDER — MIDAZOLAM HYDROCHLORIDE 2 MG/2ML
INJECTION, SOLUTION INTRAMUSCULAR; INTRAVENOUS
Status: COMPLETED | OUTPATIENT
Start: 2024-08-14 | End: 2024-08-14

## 2024-08-14 RX ORDER — BUPIVACAINE HYDROCHLORIDE 5 MG/ML
INJECTION, SOLUTION EPIDURAL; INTRACAUDAL
Status: COMPLETED | OUTPATIENT
Start: 2024-08-14 | End: 2024-08-14

## 2024-08-14 RX ORDER — 0.9 % SODIUM CHLORIDE 0.9 %
INTRAVENOUS SOLUTION INTRAVENOUS CONTINUOUS PRN
Status: COMPLETED | OUTPATIENT
Start: 2024-08-14 | End: 2024-08-14

## 2024-08-14 RX ORDER — LIDOCAINE HYDROCHLORIDE 10 MG/ML
INJECTION, SOLUTION EPIDURAL; INFILTRATION; INTRACAUDAL; PERINEURAL
Status: COMPLETED | OUTPATIENT
Start: 2024-08-14 | End: 2024-08-14

## 2024-08-14 RX ADMIN — MIDAZOLAM HYDROCHLORIDE 2 MG: 1 INJECTION, SOLUTION INTRAMUSCULAR; INTRAVENOUS at 09:31

## 2024-08-14 RX ADMIN — SODIUM CHLORIDE 500 ML: 0.9 INJECTION, SOLUTION INTRAVENOUS at 09:31

## 2024-08-14 RX ADMIN — LIDOCAINE HYDROCHLORIDE 5 ML: 10 INJECTION, SOLUTION EPIDURAL; INFILTRATION; INTRACAUDAL at 09:31

## 2024-08-14 RX ADMIN — BUPIVACAINE HYDROCHLORIDE 5 ML: 5 INJECTION, SOLUTION EPIDURAL; INTRACAUDAL; PERINEURAL at 09:32

## 2024-08-14 RX ADMIN — IOHEXOL 3 ML: 180 INJECTION INTRAVENOUS at 09:32

## 2024-08-14 ASSESSMENT — PAIN SCALES - GENERAL: PAINLEVEL_OUTOF10: 9

## 2024-08-14 ASSESSMENT — PAIN DESCRIPTION - DESCRIPTORS: DESCRIPTORS: THROBBING

## 2024-08-14 ASSESSMENT — PAIN DESCRIPTION - LOCATION: LOCATION: NECK

## 2024-08-14 NOTE — DISCHARGE INSTRUCTIONS
Discharge Instructions following Sedation or Anesthesia:  You have  received  a sedative/anesthetic therefore, you should not consume any alcoholic beverages for minimum of 12 hours.  Do not drive or operate machinery for 24 hours.  Do not sign legal documents for 24 hours.  Dizziness, drowsiness, and unsteadiness may occur.  Rest when need to.  Increase diet as tolerated.  Keep up on fluids if diet allows.      General Instructions:  Do not take a tub bath for 72 hours after procedure (this includes hot tubs and swimming pools).  You may shower, but avoid hot water to injection site.   Avoid strenuous activity TODAY especially if you experience dizziness.   Remove band-aid the next day.  Wash off any residual iodine   Do not use heat, heating pad, or any other heating device over the injection site for 3 days after the procedure.  If you experience pain after your procedure, you may continue with your current pain medication as prescribed.  (DO NOT INCREASE YOUR PAIN MEDICATION WITHOUT TALKING TO DOCTOR)  Soreness and pain at injection site is common, may use ice to reduce soreness.    Please complete pain diary as instructed.     Call [facility] at [phone] if you experience:   Fever, chills or temperature over 100    Vomiting, Headache, persistent stiff neck, nausea, blurred vision   Difficulty in urinating or unable to urinate with 8 hours   Increase in weakness, numbness or loss of function   Increased redness, swelling or drainage at the injection site

## 2024-08-14 NOTE — OP NOTE
Preoperative Diagnosis: Cervical spondylosis and chronic cervicalgia  Postoperative Diagnosis: Cervical spondylosis and chronic cervicalgia    Procedure Performed:  Right Cervical Medical branch nerve block at C2 / C/3 / C4 nerves under fluoroscopy guidance    Procedure:      The Patient was seen in the preop area, chart was reviewed, informed consent was obtained. Patient was taken to procedure room and was placed in lateral position with procedure side facing upward.. Vital signs were monitored through out the  Procedure. A time out was completed. The site was prepped and draped in sterile manner.     The target point was identified with fluoro guidance. Skin and deep tissues were anesthetized with 1 % lidocaine. A  25-gauge needlele was advanced under fluoroscopy guidance to the targets until a bony contact was made. Position was conformed in AP and lateral views. Then after negative aspiration contrast dye was injected that showed  spread of the contrast over the target area  With no epidural, vascular runoff or intrathecal spread.   Finally 0. 5 ml of treatment solution containing 3 was injected at each spot.    The needle was removed and a Band-Aid was placed over the needle  insertion site.  The patient's vital signs remained stable and the patient tolerated the procedure well.    Post Procedure pain score in recovery 15 minutes later was 0-1/10      SEDATION NOTE:    ASA CLASSIFICATION  3  MP   CLASSIFICATION  3    Moderate intravenous conscious sedation was supervised by Dr. Nash  The patient was independently monitored by a Registered Nurse assigned to the Procedure Room  Monitoring included automated blood pressure, continuous EKG, Capnography and continuous pulse oximetry.   The detailed Conscious Record is permanently stored in the Hospital Information System.     The following is the conscious sedation record;  Start Time:  0925  End times:  0938  Duration:  13 MINUTES  MEDS GIVEN 2 MG VERSED AND 0

## 2024-08-14 NOTE — H&P
Pain Pre-Op H&P Note    Jaden Nash MD    HPI: Lyndsey Gore  presents with     Index pain today is right-sided axial cervical spinal pain that is associated with occipital headaches  Onset of symptom more than 1 year ago pain aggravates with neck movement  She has tried rest NSAIDs physical therapy with limited benefit  Clinical presentation suggest facet mediated pain  Tenderness to palpation over right-sided cervical facet in the upper cervical spine  Neck disability index to moderate disability associated with neck pain    Past Medical History:   Diagnosis Date    Asthma     Depression     ESBL (extended spectrum beta-lactamase) producing bacteria infection 8/8/20214    E. Coli urine    Fibromyalgia     Gastroparesis     Headache     Hearing loss     Hyperlipidemia     Hypertension     Migraine     Neuropathy     Osteoarthritis     Seizure (HCC) 04/27/2022    Sleep difficulties     Tennis elbow     right    Tremor        Past Surgical History:   Procedure Laterality Date    ADENOIDECTOMY      BREAST SURGERY      CARDIAC CATHETERIZATION  7-    COSMETIC SURGERY      nose    HYSTERECTOMY (CERVIX STATUS UNKNOWN)      KNEE SURGERY      SINUS SURGERY      TONSILLECTOMY         Family History   Problem Relation Age of Onset    Cancer Mother         breast and bone    Heart Disease Father     Diabetes Sister     High Blood Pressure Sister     Diabetes Brother     High Blood Pressure Brother     Heart Disease Maternal Grandmother     Cancer Maternal Grandmother         breast    Cancer Paternal Grandmother     Heart Disease Paternal Grandfather        Allergies   Allergen Reactions    Latex Rash    Cipro [Ciprofloxacin Hcl] Other (See Comments)    Keflex [Cephalexin] Other (See Comments)    Motrin [Ibuprofen]     Norco [Hydrocodone-Acetaminophen] Itching    Penicillin G Pot In Dextrose [Penicillin G Potassium In D5w] Other (See Comments)    Penicillins     Tramadol     Dilaudid [Hydromorphone] Itching and Rash

## 2024-08-15 ENCOUNTER — TELEPHONE (OUTPATIENT)
Dept: PAIN MANAGEMENT | Age: 54
End: 2024-08-15

## 2024-08-15 NOTE — TELEPHONE ENCOUNTER
Left vm to give office a call for procedure fup questions, please call back asap as this is the 2nd phone call.

## 2024-08-16 NOTE — TELEPHONE ENCOUNTER
Left vm to call office back for procedure fup questions, this is the 3rd call if we do not get a return call today your procedure for medial branch block will be canceled.

## 2024-08-16 NOTE — TELEPHONE ENCOUNTER
This was discussed with Dr. Reaves.  Call placed to [facility] and writer advised that Dr. Reaves will not fill out the form.  Patient verbally stated her understanding.

## 2024-09-19 ENCOUNTER — HOSPITAL ENCOUNTER (OUTPATIENT)
Dept: PAIN MANAGEMENT | Age: 54
Discharge: HOME | End: 2024-09-19

## 2024-09-19 VITALS — WEIGHT: 276 LBS | BODY MASS INDEX: 41.83 KG/M2 | HEIGHT: 68 IN

## 2024-09-19 DIAGNOSIS — M54.51 VERTEBROGENIC LOW BACK PAIN: ICD-10-CM

## 2024-09-19 DIAGNOSIS — M47.812 CERVICAL SPONDYLOSIS: ICD-10-CM

## 2024-09-19 DIAGNOSIS — M47.817 LUMBOSACRAL SPONDYLOSIS WITHOUT MYELOPATHY: Primary | ICD-10-CM

## 2024-09-19 PROCEDURE — 99213 OFFICE O/P EST LOW 20 MIN: CPT

## 2024-09-19 PROCEDURE — 99214 OFFICE O/P EST MOD 30 MIN: CPT | Performed by: ANESTHESIOLOGY

## 2024-09-19 ASSESSMENT — ENCOUNTER SYMPTOMS
CHEST TIGHTNESS: 0
DIARRHEA: 0
RESPIRATORY NEGATIVE: 1
NAUSEA: 0
CONSTIPATION: 0
SHORTNESS OF BREATH: 0
VOMITING: 0
GASTROINTESTINAL NEGATIVE: 1
SORE THROAT: 0

## 2024-09-19 ASSESSMENT — PAIN DESCRIPTION - PAIN TYPE: TYPE: CHRONIC PAIN

## 2024-09-19 ASSESSMENT — PAIN DESCRIPTION - FREQUENCY: FREQUENCY: CONTINUOUS

## 2024-09-19 ASSESSMENT — PAIN DESCRIPTION - ORIENTATION: ORIENTATION: LOWER

## 2024-09-19 ASSESSMENT — PAIN DESCRIPTION - LOCATION: LOCATION: BACK

## 2024-09-19 ASSESSMENT — PAIN DESCRIPTION - DESCRIPTORS: DESCRIPTORS: ACHING

## 2024-09-19 ASSESSMENT — PAIN SCALES - GENERAL: PAINLEVEL_OUTOF10: 8

## 2024-09-24 ENCOUNTER — TELEPHONE (OUTPATIENT)
Dept: PAIN MANAGEMENT | Age: 54
End: 2024-09-24

## 2024-09-30 ENCOUNTER — HOSPITAL ENCOUNTER (EMERGENCY)
Age: 54
Discharge: HOME | End: 2024-09-30
Attending: EMERGENCY MEDICINE
Payer: COMMERCIAL

## 2024-09-30 VITALS
HEIGHT: 68 IN | OXYGEN SATURATION: 99 % | DIASTOLIC BLOOD PRESSURE: 80 MMHG | SYSTOLIC BLOOD PRESSURE: 140 MMHG | RESPIRATION RATE: 18 BRPM | HEART RATE: 103 BPM | WEIGHT: 276 LBS | BODY MASS INDEX: 41.83 KG/M2 | TEMPERATURE: 98.2 F

## 2024-09-30 DIAGNOSIS — G89.29 CHRONIC NECK PAIN: Primary | ICD-10-CM

## 2024-09-30 DIAGNOSIS — M54.2 CHRONIC NECK PAIN: Primary | ICD-10-CM

## 2024-09-30 PROCEDURE — 6360000002 HC RX W HCPCS

## 2024-09-30 PROCEDURE — 99284 EMERGENCY DEPT VISIT MOD MDM: CPT

## 2024-09-30 PROCEDURE — 6370000000 HC RX 637 (ALT 250 FOR IP)

## 2024-09-30 PROCEDURE — 96372 THER/PROPH/DIAG INJ SC/IM: CPT

## 2024-09-30 RX ORDER — KETOROLAC TROMETHAMINE 30 MG/ML
30 INJECTION, SOLUTION INTRAMUSCULAR; INTRAVENOUS ONCE
Status: COMPLETED | OUTPATIENT
Start: 2024-09-30 | End: 2024-09-30

## 2024-09-30 RX ORDER — CYCLOBENZAPRINE HCL 10 MG
10 TABLET ORAL ONCE
Status: COMPLETED | OUTPATIENT
Start: 2024-09-30 | End: 2024-09-30

## 2024-09-30 RX ORDER — CALCIUM CARBONATE 500 MG/1
1 TABLET, CHEWABLE ORAL DAILY
Qty: 30 TABLET | Refills: 0 | Status: SHIPPED | OUTPATIENT
Start: 2024-09-30 | End: 2024-10-30

## 2024-09-30 RX ORDER — FAMOTIDINE 20 MG/1
20 TABLET, FILM COATED ORAL ONCE
Status: COMPLETED | OUTPATIENT
Start: 2024-09-30 | End: 2024-09-30

## 2024-09-30 RX ORDER — CYCLOBENZAPRINE HCL 5 MG
5 TABLET ORAL 3 TIMES DAILY PRN
Qty: 15 TABLET | Refills: 0 | Status: SHIPPED | OUTPATIENT
Start: 2024-09-30 | End: 2024-10-05

## 2024-09-30 RX ORDER — KETOROLAC TROMETHAMINE 10 MG/1
10 TABLET, FILM COATED ORAL EVERY 6 HOURS PRN
Qty: 20 TABLET | Refills: 0 | Status: SHIPPED | OUTPATIENT
Start: 2024-09-30

## 2024-09-30 RX ADMIN — KETOROLAC TROMETHAMINE 30 MG: 30 INJECTION, SOLUTION INTRAMUSCULAR; INTRAVENOUS at 17:49

## 2024-09-30 RX ADMIN — FAMOTIDINE 20 MG: 20 TABLET, FILM COATED ORAL at 17:49

## 2024-09-30 RX ADMIN — CYCLOBENZAPRINE 10 MG: 10 TABLET, FILM COATED ORAL at 17:48

## 2024-09-30 RX ADMIN — DICLOFENAC SODIUM 2 G: 10 GEL TOPICAL at 19:11

## 2024-09-30 ASSESSMENT — PAIN DESCRIPTION - DESCRIPTORS: DESCRIPTORS: ACHING

## 2024-09-30 ASSESSMENT — PAIN SCALES - GENERAL: PAINLEVEL_OUTOF10: 10

## 2024-09-30 ASSESSMENT — PAIN - FUNCTIONAL ASSESSMENT: PAIN_FUNCTIONAL_ASSESSMENT: 0-10

## 2024-09-30 NOTE — ED PROVIDER NOTES
FACULTY SIGN-OUT  ADDENDUM       Patient: Lyndsey Gore   MRN: [id number]  PCP:  Lya Pollard MD  Note Started: 9/30/24, 6:27 PM EDT  Attestation  I was available and discussed any additional care issues that arose and coordinated the management plans with the resident(s) caring for the patient during my duty period. Any areas of disagreement with resident's documentation of care or procedures are noted on the chart. I was personally present for the key portions of any/all procedures during my duty period. I have documented in the chart those procedures where I was not present during the key portions.   The patient's initial evaluation and plan have been discussed with the prior provider who initially evaluated the patient.      Pertinent Comments:  The patient is a 54 y.o. female taken in signout with acute on chronic neck pain with no new injury but does have significant trapezial spasm with no midline C-spine tenderness and neurologically intact  We are awaiting reevaluation after attempted symptomatic control    ED COURSE      The patient was given the following medications:  Orders Placed This Encounter   Medications    diclofenac sodium (VOLTAREN) 1 % gel 2 g    ketorolac (TORADOL) injection 30 mg    cyclobenzaprine (FLEXERIL) tablet 10 mg    famotidine (PEPCID) tablet 20 mg       RECENT VITALS:   BP: (!) 140/80  Pulse: (!) 103  Respirations: 18  Temp: 98.2 °F (36.8 °C) SpO2: 99 %    (Please note that portions of this note were completed with a voice recognition program.  Efforts were made to edit the dictations but occasionally words are mis-transcribed.)    Yunior Rojas MD [facility]  Attending Emergency Medicine Physician       Yunior Rojas MD  09/30/24 [id number]    
   [facility]     Emergency Department     Faculty Attestation    I performed a history and physical examination of the patient and discussed management with the resident. I reviewed the resident’s note and agree with the documented findings and plan of care. Any areas of disagreement are noted on the chart. I was personally present for the key portions of any procedures. I have documented in the chart those procedures where I was not present during the key portions. I have reviewed the emergency nurses triage note. I agree with the chief complaint, past medical history, past surgical history, allergies, medications, social and family history as documented unless otherwise noted below. Documentation of the HPI, Physical Exam and Medical Decision Making performed by medical students or scribes is based on my personal performance of the HPI, PE and MDM. For Physician Assistant/ Nurse Practitioner cases/documentation I have personally evaluated this patient and have completed at least one if not all key elements of the E/M (history, physical exam, and MDM). Additional findings are as noted.    Vital signs:   Vitals:    09/30/24 1643   BP: (!) 140/80   Pulse: (!) 103   Resp: 18   Temp: 98.2 °F (36.8 °C)   SpO2: 99%      Right trapezius spasm. Patient states she slept wrong and has been lifting at work. No fall or other trauma.  Patient has had epidural injections in the past, and the last one was about a month ago.  Since that time she has had a lot of tightness in her trapezius.  She reports tingling and numbness down the right arm and shooting pain down the right arm.  On exam, the patient has good  strength bilaterally.  Plan for pain control and reassess            Ora Meeks M.D,  Attending Emergency  Physician            Ora Meeks MD  09/30/24 2798       Ora Meeks MD  09/30/24 2738    
 Encouraged patient to follow-up with PCP. [JF]   1926 Patient has documented allergy to ibuprofen.  She gets an upset stomach but otherwise tolerates the medication.  Will provide with Maalox and Pepcid.  Patient in agreement with this plan [JF]      ED Course User Index  [JF] Kaley Duckworth DO       PROCEDURES:    CONSULTS:  None    CRITICAL CARE:  There was significant risk of life threatening deterioration of patient's condition requiring my direct management. Critical care time 0 minutes, excluding any documented procedures.    FINAL IMPRESSION      1. Chronic neck pain          DISPOSITION / PLAN     DISPOSITION Decision To Discharge 09/30/2024 07:26:17 PM  Condition at Disposition: Data Unavailable      PATIENT REFERRED TO:  Lay Pollard MD  [address]  [place] [address] [facility] [address]  [phone]    Schedule an appointment as soon as possible for a visit   As needed    [facility] ED  [address] [id number] [phone]  Go to   If symptoms worsen      DISCHARGE MEDICATIONS:  Discharge Medication List as of 9/30/2024  7:28 PM        START taking these medications    Details   cyclobenzaprine (FLEXERIL) 5 MG tablet Take 1 tablet by mouth 3 times daily as needed for Muscle spasms, Disp-15 tablet, R-0Normal      ketorolac (TORADOL) 10 MG tablet Take 1 tablet by mouth every 6 hours as needed for Pain, Disp-20 tablet, R-0Normal      calcium carbonate (ANTACID) 500 MG chewable tablet Take 1 tablet by mouth daily, Disp-30 tablet, R-0Normal             Kaley Duckworth DO  Emergency Medicine Resident    (Please note that portions of thisnote were completed with a voice recognition program.  Efforts were made to edit the dictations but occasionally words are mis-transcribed.)

## 2024-09-30 NOTE — ED NOTES
Pt presents to the ER via triage ambulatory. Pt c/o right sided neck/back pain and muscle spasms. Pt states hx of chronic back & neck issues. Pt states Thursday last week its gotten worse. Pt denies any injury but wants to make sure nothing else is going on. Pt A&O x4, in NAD, VSS.

## 2024-09-30 NOTE — DISCHARGE INSTRUCTIONS
You were seen in the ER today for neck pain.  You were given anti-inflammatories, muscle relaxers.  Prescriptions have been given for the same, use as directed.  Also apply ice or heat, whichever feels better.  You have also been given Voltaren gel, use as directed.  Please follow-up with your primary care doctor within the next few days for reevaluation.  Return to the ER if new or worsening symptoms or any other concerns.

## 2024-10-01 ENCOUNTER — TELEPHONE (OUTPATIENT)
Dept: FAMILY MEDICINE CLINIC | Age: 54
End: 2024-10-01

## 2024-10-01 NOTE — TELEPHONE ENCOUNTER
[facility] fax over a request for refill on patient lisinopril 20 mg tablet writer do not see on patient medication list but writer see that Dr. Pimentel discontinued patient lisinopril 20 mg 6-5-24 for list cleanup. Thank you

## 2024-10-02 RX ORDER — LISINOPRIL 20 MG/1
20 TABLET ORAL DAILY
Qty: 30 TABLET | Refills: 3 | OUTPATIENT
Start: 2024-10-02

## 2024-10-02 NOTE — TELEPHONE ENCOUNTER
Last visit: 08/19/2022  Last Med refill: 08/02/2023  Does patient have enough medication for 72 hours: No:     Next Visit Date:  Future Appointments   Date Time Provider Department Center   10/21/2024  1:40 PM Nadir Mcpherson MD [facility] DEP   10/31/2024 12:00 PM Jaden Nash MD [facility] [place]       Health Maintenance   Topic Date Due    Pneumococcal 0-64 years Vaccine (1 of 2 - PCV) Never done    Depression Monitoring  Never done    HIV screen  Never done    Hepatitis C screen  Never done    Hepatitis B vaccine (1 of 3 - 19+ 3-dose series) Never done    DTaP/Tdap/Td vaccine (1 - Tdap) Never done    Colorectal Cancer Screen  Never done    Shingles vaccine (1 of 2) Never done    Breast cancer screen  11/22/2018    Flu vaccine (1) Never done    COVID-19 Vaccine (1 - 2023-24 season) Never done    Lipids  04/28/2027    Hepatitis A vaccine  Aged Out    Hib vaccine  Aged Out    Polio vaccine  Aged Out    Meningococcal (ACWY) vaccine  Aged Out    Diabetes screen  Discontinued       Hemoglobin A1C (%)   Date Value   04/28/2022 5.2   08/10/2016 5.4   07/12/2015 5.6             ( goal A1C is < 7)   No components found for: \"LABMICR\"  No components found for: \"LDLCHOLESTEROL\", \"LDLCALC\"    (goal LDL is <100)   AST (U/L)   Date Value   07/31/2022 132 (H)     ALT (U/L)   Date Value   07/31/2022 74 (H)     BUN (mg/dL)   Date Value   12/04/2023 14     BP Readings from Last 3 Encounters:   09/30/24 (!) 140/80   08/14/24 123/66   06/05/24 (!) 142/80          (goal 120/80)    All Future Testing planned in [facility]  Lab Frequency Next Occurrence   UT NJX DX/THER SBST INTRLMNR CRV/THRC W/IMG GDN Once 05/31/2024   UT NJX DX/THER AGT PVRT FACET JT CRV/THRC 1 LEVEL Once 07/12/2024               Patient Active Problem List:     Chest pain     Abdominal pain     Gastroparesis     Hypertension     Asthma     Neuropathy     Fibromyalgia     Depression     S/P cardiac cath- Normal 7/14/15 -= Dr. juan luis Stanley

## 2024-10-31 ENCOUNTER — HOSPITAL ENCOUNTER (OUTPATIENT)
Dept: PAIN MANAGEMENT | Age: 54
Discharge: HOME | End: 2024-10-31
Payer: COMMERCIAL

## 2024-10-31 VITALS — WEIGHT: 276 LBS | BODY MASS INDEX: 41.83 KG/M2 | HEIGHT: 68 IN

## 2024-10-31 DIAGNOSIS — M47.812 CERVICAL SPONDYLOSIS: ICD-10-CM

## 2024-10-31 DIAGNOSIS — M47.817 LUMBOSACRAL SPONDYLOSIS WITHOUT MYELOPATHY: Primary | ICD-10-CM

## 2024-10-31 DIAGNOSIS — M54.51 VERTEBROGENIC LOW BACK PAIN: ICD-10-CM

## 2024-10-31 PROCEDURE — 99213 OFFICE O/P EST LOW 20 MIN: CPT

## 2024-10-31 PROCEDURE — 99214 OFFICE O/P EST MOD 30 MIN: CPT | Performed by: ANESTHESIOLOGY

## 2024-10-31 ASSESSMENT — ENCOUNTER SYMPTOMS
RESPIRATORY NEGATIVE: 1
BACK PAIN: 1
EYES NEGATIVE: 1

## 2024-10-31 NOTE — PROGRESS NOTES
The patient is a 54 y.o.Non- / non  female.    Chief Complaint   Patient presents with    Back Pain     6 wk f/u        HPI  54-year-old female with history of chronic neck and lower back pain  Was last seen several months back  Did not follow-up for work to schedule    Today returns complaining of chronic neck and lower back pain    Neck pain is predominantly axial right side more than left extends over the axial cervical spine and trapezius to the shoulder  No dermatomal radiation  She failed conservative measures with therapy  Was diagnosed with facet mediated pain  Patient had a right-sided cervical medial branch nerve block at C2-C3-C4 that provided more than 80% improvement for several hours consistent with the duration of local anesthetic with improved range of motion and then symptoms returned back to the baseline  She was not able to get a scheduled for confirmatory block because of her schedule but now wish to proceed  Symptoms unchanged affecting quality of life    Back pain  Chronic going on for many years  No previous interventional procedure or surgical history  No formal core strengthening physical therapy  Recent MRI lumbar spine July 2024, report reviewed and images were reviewed independently  Showed significant lumbar degenerative disc disease with Modic changes and endplate degeneration at L4-5 and L5-S1 level along with facet arthropathy  New referral provided for physical therapy      Patient is here today for: back pain  Pain level: 8.5  Character: aching, burning, stabbing, spasm  Radiating: left leg  Weakness or numbness: yes  Aggravating Factors: sitting, bending, standing, walking   Alleviating Factors: nothing  Constant or intermitting: constant  Bladder/bowel loss: no        Past Medical History:   Diagnosis Date    Asthma     Depression     ESBL (extended spectrum beta-lactamase) producing bacteria infection 8/8/20214    E. Coli urine    Fibromyalgia     Gastroparesis

## 2024-11-01 ENCOUNTER — OFFICE VISIT (OUTPATIENT)
Dept: FAMILY MEDICINE CLINIC | Age: 54
End: 2024-11-01

## 2024-11-01 VITALS — BODY MASS INDEX: 41.36 KG/M2 | TEMPERATURE: 98.1 F | WEIGHT: 272 LBS

## 2024-11-01 DIAGNOSIS — Z00.00 HEALTHCARE MAINTENANCE: Primary | ICD-10-CM

## 2024-11-01 DIAGNOSIS — N83.201 CYST OF RIGHT OVARY: ICD-10-CM

## 2024-11-01 DIAGNOSIS — R56.9 SEIZURE (HCC): ICD-10-CM

## 2024-11-01 DIAGNOSIS — L84 FOOT CALLUS: ICD-10-CM

## 2024-11-01 DIAGNOSIS — Z12.31 ENCOUNTER FOR SCREENING MAMMOGRAM FOR MALIGNANT NEOPLASM OF BREAST: ICD-10-CM

## 2024-11-01 DIAGNOSIS — M79.7 FIBROMYALGIA: ICD-10-CM

## 2024-11-01 DIAGNOSIS — Z12.11 COLON CANCER SCREENING: ICD-10-CM

## 2024-11-01 RX ORDER — CERAMIDE 1,3,6-II/SALICYLIC/B3
10 CLEANSER (ML) TOPICAL 2 TIMES DAILY
Qty: 237 ML | Refills: 0 | Status: SHIPPED | OUTPATIENT
Start: 2024-11-01

## 2024-11-01 RX ORDER — LAMOTRIGINE 100 MG/1
100 TABLET ORAL 2 TIMES DAILY
Qty: 60 TABLET | Refills: 5 | Status: SHIPPED | OUTPATIENT
Start: 2024-11-01 | End: 2025-04-30

## 2024-11-01 NOTE — PROGRESS NOTES
Attending Physician Statement  I  have discussed the care of Lyndsey Gore including pertinent history and exam findings with the resident. I agree with the assessment, plan and orders as documented by the resident.      Temp 98.1 °F (36.7 °C) (Oral)   Wt 123.4 kg (272 lb)   BMI 41.36 kg/m²    BP Readings from Last 3 Encounters:   09/30/24 (!) 140/80   08/14/24 123/66   06/05/24 (!) 142/80     Wt Readings from Last 3 Encounters:   11/01/24 123.4 kg (272 lb)   10/31/24 125.2 kg (276 lb)   09/30/24 125.2 kg (276 lb)          Diagnosis Orders   1. Healthcare maintenance  HIV Screen    Hepatitis C Antibody      2. Seizure (HCC)  lamoTRIgine (LAMICTAL) 100 MG tablet      3. Cyst of right ovary  [facility]      4. Encounter for screening mammogram for malignant neoplasm of breast  LEONELA DIGITAL SCREEN W OR WO CAD BILATERAL      5. Colon cancer screening  Fecal DNA Colorectal cancer screening (Cologuard)      6. Fibromyalgia  amitriptyline (ELAVIL) 25 MG tablet      7. Foot callus  [facility] [place]    Emollient (CERAVE DAILY MOISTURIZING) DANY Cruz DO 11/1/2024 4:25 PM      
Visit Information    Have you changed or started any medications since your last visit including any over-the-counter medicines, vitamins, or herbal medicines? no   Have you stopped taking any of your medications? Is so, why? -  no  Are you having any side effects from any of your medications? - no    Have you seen any other physician or provider since your last visit?  no   Have you had any other diagnostic tests since your last visit?  no   Have you been seen in the emergency room and/or had an admission in a hospital since we last saw you?  no   Have you had your routine dental cleaning in the past 6 months?  no     Do you have an active MyChart account? If no, what is the barrier?  No:     Patient Care Team:  Nadir Mcpherson MD as PCP - General (Family Medicine)    Medical History Review  Past Medical, Family, and Social History reviewed and does not contribute to the patient presenting condition    Health Maintenance   Topic Date Due    Pneumococcal 0-64 years Vaccine (1 of 2 - PCV) Never done    Depression Monitoring  Never done    HIV screen  Never done    Hepatitis C screen  Never done    Hepatitis B vaccine (1 of 3 - 19+ 3-dose series) Never done    DTaP/Tdap/Td vaccine (1 - Tdap) Never done    Colorectal Cancer Screen  Never done    Shingles vaccine (1 of 2) Never done    Breast cancer screen  11/22/2018    Flu vaccine (1) Never done    COVID-19 Vaccine (1 - 2023-24 season) Never done    Lipids  04/28/2027    Hepatitis A vaccine  Aged Out    Hib vaccine  Aged Out    Polio vaccine  Aged Out    Meningococcal (ACWY) vaccine  Aged Out    Diabetes screen  Discontinued               
  BP Readings from Last 3 Encounters:   09/30/24 (!) 140/80   08/14/24 123/66   06/05/24 (!) 142/80       Physical Exam    Constitutional:       General: She is not in acute distress.     Appearance: Normal appearance.   Cardiovascular:      Rate and Rhythm: Normal rate and regular rhythm.      Pulses: Normal pulses.      Heart sounds: Normal heart sounds. No murmur heard.  Pulmonary:      Effort: Pulmonary effort is normal.      Breath sounds: Normal breath sounds. No wheezing, rhonchi or rales.   Abdominal:      General: Abdomen is flat. Bowel sounds are normal.      Palpations: Abdomen is soft. No tenderness  Musculoskeletal:      Right lower leg: No edema.      Left lower leg: No edema. Dryness present on left ankle (site of previous injury). Tenderness to palpation  Neurological:      General: No focal deficit present.      Mental Status: She is alert and oriented to person, place, and time.       Lab Results   Component Value Date    WBC 7.4 12/04/2023    HGB 11.1 (L) 12/04/2023    HCT 36.4 12/04/2023     12/04/2023    CHOL 195 04/28/2022    TRIG 84 04/28/2022    HDL 56 04/28/2022    ALT 74 (H) 07/31/2022     (H) 07/31/2022     12/04/2023    K 4.0 12/04/2023     12/04/2023    CREATININE 0.6 12/04/2023    BUN 14 12/04/2023    CO2 22 12/04/2023    TSH 2.11 07/31/2023    INR 0.9 05/17/2016    LABA1C 5.2 04/28/2022     Lab Results   Component Value Date    CALCIUM 9.3 12/04/2023    PHOS 4.1 03/31/2012     No results found for: \"LDLDIRECT\"    Assessment and Plan:    1. Seizure (HCC)  - Medication refilled; Patient's epilepsy well controlled on Lamictal with last seizure more than 2 years ago. Continue current regimen as recommended by Neurology  - lamoTRIgine (LAMICTAL) 100 MG tablet; Take 1 tablet by mouth 2 times daily  Dispense: 60 tablet; Refill: 5    2. Healthcare maintenance  - HIV Screen; Future  - Hepatitis C Antibody; Future    3. Cyst of right ovary  - As per the patient, she has

## 2024-11-08 NOTE — PROGRESS NOTES
Patient instructed to remove shoes and socks and instructed to sit in exam chair.  Current PCP is Nadir Mcpherson MD and date of last visit was 11/01/2024.   Do you have a follow up visit scheduled?  Yes  If yes, the date is 01/06/2025

## 2024-11-18 ENCOUNTER — HOSPITAL ENCOUNTER (OUTPATIENT)
Age: 54
Discharge: HOME | End: 2024-11-18
Payer: COMMERCIAL

## 2024-11-18 ENCOUNTER — OFFICE VISIT (OUTPATIENT)
Dept: PODIATRY | Age: 54
End: 2024-11-18
Payer: COMMERCIAL

## 2024-11-18 ENCOUNTER — HOSPITAL ENCOUNTER (OUTPATIENT)
Age: 54
Discharge: HOME | End: 2024-11-20
Payer: COMMERCIAL

## 2024-11-18 ENCOUNTER — HOSPITAL ENCOUNTER (OUTPATIENT)
Dept: GENERAL RADIOLOGY | Age: 54
Discharge: HOME | End: 2024-11-20
Payer: COMMERCIAL

## 2024-11-18 VITALS
WEIGHT: 269 LBS | HEART RATE: 82 BPM | HEIGHT: 68 IN | SYSTOLIC BLOOD PRESSURE: 143 MMHG | BODY MASS INDEX: 40.77 KG/M2 | DIASTOLIC BLOOD PRESSURE: 84 MMHG

## 2024-11-18 DIAGNOSIS — M79.671 FOOT PAIN, BILATERAL: ICD-10-CM

## 2024-11-18 DIAGNOSIS — Z00.00 HEALTHCARE MAINTENANCE: ICD-10-CM

## 2024-11-18 DIAGNOSIS — M79.672 FOOT PAIN, BILATERAL: ICD-10-CM

## 2024-11-18 DIAGNOSIS — M79.672 FOOT PAIN, BILATERAL: Primary | ICD-10-CM

## 2024-11-18 DIAGNOSIS — M79.671 FOOT PAIN, BILATERAL: Primary | ICD-10-CM

## 2024-11-18 DIAGNOSIS — L84 CALLUS: ICD-10-CM

## 2024-11-18 LAB
HCV AB SERPL QL IA: NONREACTIVE
HIV 1+2 AB+HIV1 P24 AG SERPL QL IA: NONREACTIVE

## 2024-11-18 PROCEDURE — 87389 HIV-1 AG W/HIV-1&-2 AB AG IA: CPT

## 2024-11-18 PROCEDURE — 86803 HEPATITIS C AB TEST: CPT

## 2024-11-18 PROCEDURE — 11056 PARNG/CUTG B9 HYPRKR LES 2-4: CPT

## 2024-11-18 PROCEDURE — 36415 COLL VENOUS BLD VENIPUNCTURE: CPT

## 2024-11-18 PROCEDURE — 73630 X-RAY EXAM OF FOOT: CPT

## 2024-11-18 RX ORDER — UREA 40 %
CREAM (GRAM) TOPICAL
Qty: 198 G | Refills: 5 | Status: SHIPPED | OUTPATIENT
Start: 2024-11-18

## 2024-11-18 RX ORDER — TRIAMCINOLONE ACETONIDE 0.25 MG/G
OINTMENT TOPICAL
Qty: 80 G | Refills: 1 | Status: SHIPPED | OUTPATIENT
Start: 2024-11-18 | End: 2024-11-25

## 2024-11-18 NOTE — PROGRESS NOTES
[facility]  [address]   [address] [place] [address]  Tel: [phone]  Fax: [phone]    Subjective     CC: Painful callus B/L    HPI:  Lyndsey Gore is a 54 y.o. year old female who presents to clinic today painful callus x3 B/L. Patient says callus has been there for years and is extremely painful. She says the callus on her right foot the hurt the worst. She says she has been trimming them herself and getting routine pedicures. Patient is also complaining of a rash on her right medial foot and left lateral foot. She says she cut her left foot over the summer and is has been painful and itchy since. No other pedal complaints at this time.     Primary care physician is Nadir Mcpherson MD.    ROS:    Constitutional: Denies nausea, vomiting, fever, chills.  Neurologic: Denies numbness, tingling, and burning in the feet.    Vascular: Denies symptoms of lower extremity claudication.    Skin: Denies open wounds.  Otherwise negative except as noted in the HPI.     PMH:  Past Medical History:   Diagnosis Date    Asthma     Depression     ESBL (extended spectrum beta-lactamase) producing bacteria infection 8/8/20214    E. Coli urine    Fibromyalgia     Gastroparesis     Headache     Hearing loss     Hyperlipidemia     Hypertension     Migraine     Neuropathy     Osteoarthritis     Seizure (HCC) 04/27/2022    Sleep difficulties     Tennis elbow     right    Tremor        Surgical History:   Past Surgical History:   Procedure Laterality Date    ADENOIDECTOMY      BREAST SURGERY      CARDIAC CATHETERIZATION  7-    COSMETIC SURGERY      nose    HYSTERECTOMY (CERVIX STATUS UNKNOWN)      KNEE SURGERY      SINUS SURGERY      TONSILLECTOMY         Social History:  Social History     Tobacco Use    Smoking status: Never    Smokeless tobacco: Never   Vaping Use    Vaping status: Never Used   Substance Use Topics    Alcohol use: Yes     Alcohol/week: 31.0 standard drinks of alcohol     Types:

## 2024-11-22 ENCOUNTER — HOSPITAL ENCOUNTER (OUTPATIENT)
Dept: PAIN MANAGEMENT | Facility: CLINIC | Age: 54
Discharge: HOME | End: 2024-11-22

## 2024-11-22 VITALS
DIASTOLIC BLOOD PRESSURE: 92 MMHG | BODY MASS INDEX: 40.77 KG/M2 | OXYGEN SATURATION: 100 % | TEMPERATURE: 98.7 F | SYSTOLIC BLOOD PRESSURE: 156 MMHG | RESPIRATION RATE: 11 BRPM | HEIGHT: 68 IN | WEIGHT: 269 LBS | HEART RATE: 72 BPM

## 2024-11-22 DIAGNOSIS — M47.812 CERVICAL SPONDYLOSIS: Primary | ICD-10-CM

## 2024-11-22 DIAGNOSIS — R52 PAIN MANAGEMENT: ICD-10-CM

## 2024-11-22 PROCEDURE — 64490 INJ PARAVERT F JNT C/T 1 LEV: CPT

## 2024-11-22 PROCEDURE — 64491 INJ PARAVERT F JNT C/T 2 LEV: CPT

## 2024-11-22 PROCEDURE — 6360000002 HC RX W HCPCS: Performed by: ANESTHESIOLOGY

## 2024-11-22 PROCEDURE — 6360000004 HC RX CONTRAST MEDICATION: Performed by: ANESTHESIOLOGY

## 2024-11-22 RX ORDER — LIDOCAINE HYDROCHLORIDE 10 MG/ML
INJECTION, SOLUTION EPIDURAL; INFILTRATION; INTRACAUDAL; PERINEURAL
Status: COMPLETED | OUTPATIENT
Start: 2024-11-22 | End: 2024-11-22

## 2024-11-22 RX ORDER — MIDAZOLAM HYDROCHLORIDE 2 MG/2ML
INJECTION, SOLUTION INTRAMUSCULAR; INTRAVENOUS
Status: COMPLETED | OUTPATIENT
Start: 2024-11-22 | End: 2024-11-22

## 2024-11-22 RX ORDER — BUPIVACAINE HYDROCHLORIDE 5 MG/ML
INJECTION, SOLUTION EPIDURAL; INTRACAUDAL
Status: COMPLETED | OUTPATIENT
Start: 2024-11-22 | End: 2024-11-22

## 2024-11-22 RX ADMIN — MIDAZOLAM HYDROCHLORIDE 1 MG: 1 INJECTION, SOLUTION INTRAMUSCULAR; INTRAVENOUS at 09:02

## 2024-11-22 RX ADMIN — IOHEXOL 3 ML: 180 INJECTION INTRAVENOUS at 09:06

## 2024-11-22 RX ADMIN — LIDOCAINE HYDROCHLORIDE 2 ML: 10 INJECTION, SOLUTION EPIDURAL; INFILTRATION; INTRACAUDAL at 09:03

## 2024-11-22 RX ADMIN — BUPIVACAINE HYDROCHLORIDE 3 ML: 5 INJECTION, SOLUTION EPIDURAL; INTRACAUDAL; PERINEURAL at 09:07

## 2024-11-22 ASSESSMENT — PAIN - FUNCTIONAL ASSESSMENT
PAIN_FUNCTIONAL_ASSESSMENT: 0-10
PAIN_FUNCTIONAL_ASSESSMENT: 0-10
PAIN_FUNCTIONAL_ASSESSMENT: PREVENTS OR INTERFERES SOME ACTIVE ACTIVITIES AND ADLS

## 2024-11-22 ASSESSMENT — PAIN DESCRIPTION - LOCATION: LOCATION: NECK

## 2024-11-22 ASSESSMENT — PAIN DESCRIPTION - DESCRIPTORS
DESCRIPTORS: ACHING
DESCRIPTORS: ACHING

## 2024-11-22 ASSESSMENT — PAIN SCALES - GENERAL: PAINLEVEL_OUTOF10: 8

## 2024-11-22 NOTE — DISCHARGE INSTRUCTIONS
Discharge Instructions following Sedation or Anesthesia:  You have  received  a sedative/anesthetic therefore, you should not consume any alcoholic beverages for minimum of 12 hours.  Do not drive or operate machinery for 24 hours.  Do not sign legal documents for 24 hours.  Dizziness, drowsiness, and unsteadiness may occur.  Rest when need to.  Increase diet as tolerated.  Keep up on fluids if diet allows.      General Instructions:  Do not take a tub bath for 72 hours after procedure (this includes hot tubs and swimming pools).  You may shower, but avoid hot water to injection site.   Avoid strenuous activity TODAY especially if you experience dizziness.   Remove band-aid the next day.  Wash off any residual iodine   Do not use heat, heating pad, or any other heating device over the injection site for 3 days after the procedure.  If you experience pain after your procedure, you may continue with your current pain medication as prescribed.  (DO NOT INCREASE YOUR PAIN MEDICATION WITHOUT TALKING TO DOCTOR)  Soreness and pain at injection site is common, may use ice to reduce soreness.    Please complete pain diary as instructed.     Call [facility] at [phone] if you experience:   Fever, chills or temperature over 100    Vomiting, Headache, persistent stiff neck, nausea, blurred vision   Difficulty in urinating or unable to urinate with 8 hours   Increase in weakness, numbness or loss of function   Increased redness, swelling or drainage at the injection site

## 2024-11-22 NOTE — OP NOTE
Preoperative Diagnosis: Cervical spondylosis and chronic cervicalgia  Postoperative Diagnosis: Cervical spondylosis and chronic cervicalgia    Procedure Performed:  Right Cervical Medical branch nerve block at C2 / C3 / C4 nerves under fluoroscopy guidance    Procedure:      The Patient was seen in the preop area, chart was reviewed, informed consent was obtained. Patient was taken to procedure room and was placed in lateral position with procedure side facing upward.. Vital signs were monitored through out the  Procedure. A time out was completed. The site was prepped and draped in sterile manner.     The target point was identified with fluoro guidance. Skin and deep tissues were anesthetized with 1 % lidocaine. A  25-gauge needlele was advanced under fluoroscopy guidance to the targets until a bony contact was made. Position was conformed in AP and lateral views. Then after negative aspiration contrast dye was injected that showed  spread of the contrast over the target area  With no epidural, vascular runoff or intrathecal spread.   Finally 0. 5 ml of treatment solution containing 0.5% BUPIVACAINE was injected at each spot.    The needle was removed and a Band-Aid was placed over the needle  insertion site.  The patient's vital signs remained stable and the patient tolerated the procedure well.    Post Procedure pain score in recovery 15 minutes later was 0-1/10    SEDATION NOTE:    ASA CLASSIFICATION  3  MP   CLASSIFICATION  3    Moderate intravenous conscious sedation was supervised by Dr. Nash  The patient was independently monitored by a Registered Nurse assigned to the Procedure Room  Monitoring included automated blood pressure, continuous EKG, Capnography and continuous pulse oximetry.   The detailed Conscious Record is permanently stored in the Hospital Information System.     The following is the conscious sedation record;  Start Time:  0854  End times:  0909  Duration:  15 MINUTES  MEDS GIVEN 1 MG

## 2024-11-22 NOTE — INTERVAL H&P NOTE
Update History & Physical    The patient's History and Physical of October 31, 2024 was reviewed with the patient and I examined the patient. There was no change. The surgical site was confirmed by the patient and me.     Plan: The risks, benefits, expected outcome, and alternative to the recommended procedure have been discussed with the patient. Patient understands and wants to proceed with the procedure.     ASA 3  MP 3    Electronically signed by Jaden Nash MD on 11/22/2024 at 8:54 AM

## 2024-11-25 ENCOUNTER — TELEPHONE (OUTPATIENT)
Dept: PAIN MANAGEMENT | Age: 54
End: 2024-11-25

## 2024-11-25 NOTE — TELEPHONE ENCOUNTER
Procedure: Right Cervical Medical branch nerve block at C2 / C3 / C4 nerves under fluoroscopy guidance     DOS: 11/22/2024    Pain level before procedure with activity: 8    Pain with activity after procedure: 6    What activities done the day of procedure: did some chores around the house     What percentage of  pain relief from   procedure did you receive: 60%     Success: No     OV Scheduled: 12/13/2024

## 2025-07-25 NOTE — PROGRESS NOTES
[facility] Annual Visit    Lyndsey Gore  2027                       Primary Care Physician: Nadir Mcpherson MD    CC:   Chief Complaint   Patient presents with    Annual Exam     Patient c/o hot flashs and feeling very emotional with menapause.         HPI: Lyndsey Gore is a 55 y.o. female     The patient was seen and examined. She is here for an annual visit. She is complaining of menopausal symptoms including hot flashes, insomnia, and increased frequency of urination. She states she was having as early as her 20's.     No LMP recorded. Patient has had a hysterectomy. She had a hysterectomy performed in     Her bowel habits are irregular due to a known history of gastroparesis, has BM twice a week, hard and painful. She denies any bloating.  She denies dysuria. She reports occasional stress urinary leaking while sneezing.  She denies vaginal discharge.  She is not sexually active, but was sexually active about 6-7 months ago with one male.  She uses status post hysterectomy for contraception.    REVIEW OF SYSTEMS:   Constitutional: negative fever, negative chills   HEENT: negative visual disturbances, negative headaches  Respiratory: negative dyspnea, negative cough  Cardiovascular: negative chest pain,  negative palpitations  Gastrointestinal: negative abdominal pain, negative RUQ pain, negative N/V, negative diarrhea, positive constipation  Genitourinary: negative dysuria, negative vaginal discharge, positive stress urinary incontinence   Dermatological: negative rash  Hematologic: negative bruising  Immunologic/Lymphatic: negative recent illness, negative recent sick contact  Musculoskeletal: negative back pain, negative myalgias, negative arthralgias  Neurological:  negative dizziness, negative weakness, positive hot flashes  Behavior/Psych: negative depression, negative anxiety    GYNECOLOGICAL HISTORY:  Age of Menarche: 10  Age of Menopause: S/p hysterectomy at age 33, unsure

## 2025-07-28 ENCOUNTER — OFFICE VISIT (OUTPATIENT)
Age: 55
End: 2025-07-28

## 2025-07-28 ENCOUNTER — HOSPITAL ENCOUNTER (OUTPATIENT)
Age: 55
Setting detail: SPECIMEN
Discharge: HOME | End: 2025-07-28

## 2025-07-28 VITALS
SYSTOLIC BLOOD PRESSURE: 122 MMHG | BODY MASS INDEX: 40.32 KG/M2 | HEART RATE: 76 BPM | WEIGHT: 265.2 LBS | DIASTOLIC BLOOD PRESSURE: 78 MMHG

## 2025-07-28 DIAGNOSIS — Z12.11 ENCOUNTER FOR SCREENING COLONOSCOPY: ICD-10-CM

## 2025-07-28 DIAGNOSIS — Z12.31 OTHER SCREENING MAMMOGRAM: ICD-10-CM

## 2025-07-28 DIAGNOSIS — N95.1 HOT FLASHES DUE TO MENOPAUSE: ICD-10-CM

## 2025-07-28 DIAGNOSIS — Z01.419 WELL WOMAN EXAM WITH ROUTINE GYNECOLOGICAL EXAM: Primary | ICD-10-CM

## 2025-07-28 PROCEDURE — 3078F DIAST BP <80 MM HG: CPT

## 2025-07-28 PROCEDURE — 3074F SYST BP LT 130 MM HG: CPT

## 2025-07-28 PROCEDURE — 99386 PREV VISIT NEW AGE 40-64: CPT

## 2025-07-28 RX ORDER — PAROXETINE 10 MG/1
10 TABLET, FILM COATED ORAL DAILY
Qty: 60 TABLET | Refills: 2 | Status: SHIPPED | OUTPATIENT
Start: 2025-07-28

## 2025-07-28 NOTE — PROGRESS NOTES
Attending Physician Statement  I have discussed the care of Lyndsey Gore, including pertinent history and exam findings, with the resident. I have reviewed the key elements of all parts of the encounter with the resident.  I agree with the assessment, plan and orders as documented by the resident.  (GE Modifier)    Shea Romero [facility], [facility]  7/28/2025, 2:39 PM

## 2025-07-29 DIAGNOSIS — Z01.419 WELL WOMAN EXAM WITH ROUTINE GYNECOLOGICAL EXAM: ICD-10-CM

## 2025-07-29 LAB
C TRACH DNA SPEC QL PROBE+SIG AMP: NEGATIVE
CANDIDA SPECIES: NEGATIVE
GARDNERELLA VAGINALIS: POSITIVE
N GONORRHOEA DNA SPEC QL PROBE+SIG AMP: NEGATIVE
SOURCE: ABNORMAL
SPECIMEN DESCRIPTION: NORMAL
TRICHOMONAS: NEGATIVE

## 2025-07-30 LAB
HPV I/H RISK 4 DNA CVX QL NAA+PROBE: NOT DETECTED
HPV SAMPLE: NORMAL
HPV, INTERPRETATION: NORMAL
HPV16 DNA CVX QL NAA+PROBE: NOT DETECTED
HPV18 DNA CVX QL NAA+PROBE: NOT DETECTED
SPECIMEN DESCRIPTION: NORMAL

## 2025-08-10 LAB — CYTOLOGY REPORT: NORMAL

## 2025-08-11 DIAGNOSIS — B37.9 YEAST INFECTION: Primary | ICD-10-CM

## 2025-08-11 RX ORDER — FLUCONAZOLE 150 MG/1
150 TABLET ORAL ONCE
Qty: 1 TABLET | Refills: 0 | Status: SHIPPED | OUTPATIENT
Start: 2025-08-11 | End: 2025-08-11